# Patient Record
Sex: FEMALE | Race: WHITE | NOT HISPANIC OR LATINO
[De-identification: names, ages, dates, MRNs, and addresses within clinical notes are randomized per-mention and may not be internally consistent; named-entity substitution may affect disease eponyms.]

---

## 2019-04-09 ENCOUNTER — APPOINTMENT (OUTPATIENT)
Dept: OPHTHALMOLOGY | Facility: CLINIC | Age: 29
End: 2019-04-09
Payer: COMMERCIAL

## 2019-04-09 DIAGNOSIS — D69.3 IMMUNE THROMBOCYTOPENIC PURPURA: ICD-10-CM

## 2019-04-09 PROBLEM — Z00.00 ENCOUNTER FOR PREVENTIVE HEALTH EXAMINATION: Status: ACTIVE | Noted: 2019-04-09

## 2019-04-09 PROCEDURE — 92083 EXTENDED VISUAL FIELD XM: CPT

## 2019-04-09 PROCEDURE — 99204 OFFICE O/P NEW MOD 45 MIN: CPT

## 2019-04-09 PROCEDURE — 92133 CPTRZD OPH DX IMG PST SGM ON: CPT

## 2019-04-13 ENCOUNTER — INPATIENT (INPATIENT)
Facility: HOSPITAL | Age: 29
LOS: 2 days | Discharge: HOME | DRG: 72 | End: 2019-04-16
Attending: PSYCHIATRY & NEUROLOGY | Admitting: PSYCHIATRY & NEUROLOGY
Payer: COMMERCIAL

## 2019-04-13 VITALS
WEIGHT: 130.07 LBS | RESPIRATION RATE: 16 BRPM | DIASTOLIC BLOOD PRESSURE: 87 MMHG | HEIGHT: 64 IN | SYSTOLIC BLOOD PRESSURE: 129 MMHG | OXYGEN SATURATION: 100 % | HEART RATE: 94 BPM | TEMPERATURE: 99 F

## 2019-04-13 DIAGNOSIS — Z90.49 ACQUIRED ABSENCE OF OTHER SPECIFIED PARTS OF DIGESTIVE TRACT: Chronic | ICD-10-CM

## 2019-04-13 DIAGNOSIS — R51 HEADACHE: ICD-10-CM

## 2019-04-13 LAB
ALBUMIN SERPL ELPH-MCNC: 5 G/DL — SIGNIFICANT CHANGE UP (ref 3.3–5)
ALP SERPL-CCNC: 59 U/L — SIGNIFICANT CHANGE UP (ref 40–120)
ALT FLD-CCNC: 16 U/L — SIGNIFICANT CHANGE UP (ref 10–45)
ANION GAP SERPL CALC-SCNC: 17 MMOL/L — SIGNIFICANT CHANGE UP (ref 5–17)
APPEARANCE UR: CLEAR — SIGNIFICANT CHANGE UP
APTT BLD: 24.9 SEC — LOW (ref 27.5–36.3)
AST SERPL-CCNC: 19 U/L — SIGNIFICANT CHANGE UP (ref 10–40)
BASOPHILS # BLD AUTO: 0.1 K/UL — SIGNIFICANT CHANGE UP (ref 0–0.2)
BASOPHILS NFR BLD AUTO: 0.5 % — SIGNIFICANT CHANGE UP (ref 0–2)
BILIRUB SERPL-MCNC: 0.4 MG/DL — SIGNIFICANT CHANGE UP (ref 0.2–1.2)
BILIRUB UR-MCNC: NEGATIVE — SIGNIFICANT CHANGE UP
BUN SERPL-MCNC: 5 MG/DL — LOW (ref 7–23)
CALCIUM SERPL-MCNC: 10 MG/DL — SIGNIFICANT CHANGE UP (ref 8.4–10.5)
CHLORIDE SERPL-SCNC: 101 MMOL/L — SIGNIFICANT CHANGE UP (ref 96–108)
CO2 SERPL-SCNC: 22 MMOL/L — SIGNIFICANT CHANGE UP (ref 22–31)
COLOR SPEC: COLORLESS — SIGNIFICANT CHANGE UP
CREAT SERPL-MCNC: 0.69 MG/DL — SIGNIFICANT CHANGE UP (ref 0.5–1.3)
DIFF PNL FLD: NEGATIVE — SIGNIFICANT CHANGE UP
EOSINOPHIL # BLD AUTO: 0.3 K/UL — SIGNIFICANT CHANGE UP (ref 0–0.5)
EOSINOPHIL NFR BLD AUTO: 3.2 % — SIGNIFICANT CHANGE UP (ref 0–6)
ERYTHROCYTE [SEDIMENTATION RATE] IN BLOOD: 5 MM/HR — SIGNIFICANT CHANGE UP (ref 0–15)
GLUCOSE SERPL-MCNC: 79 MG/DL — SIGNIFICANT CHANGE UP (ref 70–99)
GLUCOSE UR QL: NEGATIVE — SIGNIFICANT CHANGE UP
HCG UR QL: NEGATIVE — SIGNIFICANT CHANGE UP
HCT VFR BLD CALC: 47.7 % — HIGH (ref 34.5–45)
HGB BLD-MCNC: 15.2 G/DL — SIGNIFICANT CHANGE UP (ref 11.5–15.5)
INR BLD: 0.96 RATIO — SIGNIFICANT CHANGE UP (ref 0.88–1.16)
KETONES UR-MCNC: NEGATIVE — SIGNIFICANT CHANGE UP
LEUKOCYTE ESTERASE UR-ACNC: NEGATIVE — SIGNIFICANT CHANGE UP
LYMPHOCYTES # BLD AUTO: 2.9 K/UL — SIGNIFICANT CHANGE UP (ref 1–3.3)
LYMPHOCYTES # BLD AUTO: 27.4 % — SIGNIFICANT CHANGE UP (ref 13–44)
MCHC RBC-ENTMCNC: 27.8 PG — SIGNIFICANT CHANGE UP (ref 27–34)
MCHC RBC-ENTMCNC: 31.9 GM/DL — LOW (ref 32–36)
MCV RBC AUTO: 87 FL — SIGNIFICANT CHANGE UP (ref 80–100)
MONOCYTES # BLD AUTO: 0.5 K/UL — SIGNIFICANT CHANGE UP (ref 0–0.9)
MONOCYTES NFR BLD AUTO: 5 % — SIGNIFICANT CHANGE UP (ref 2–14)
NEUTROPHILS # BLD AUTO: 6.8 K/UL — SIGNIFICANT CHANGE UP (ref 1.8–7.4)
NEUTROPHILS NFR BLD AUTO: 63.8 % — SIGNIFICANT CHANGE UP (ref 43–77)
NITRITE UR-MCNC: NEGATIVE — SIGNIFICANT CHANGE UP
PH UR: 6.5 — SIGNIFICANT CHANGE UP (ref 5–8)
PLATELET # BLD AUTO: 287 K/UL — SIGNIFICANT CHANGE UP (ref 150–400)
POTASSIUM SERPL-MCNC: 4 MMOL/L — SIGNIFICANT CHANGE UP (ref 3.5–5.3)
POTASSIUM SERPL-SCNC: 4 MMOL/L — SIGNIFICANT CHANGE UP (ref 3.5–5.3)
PROT SERPL-MCNC: 8.4 G/DL — HIGH (ref 6–8.3)
PROT UR-MCNC: NEGATIVE — SIGNIFICANT CHANGE UP
PROTHROM AB SERPL-ACNC: 10.9 SEC — SIGNIFICANT CHANGE UP (ref 10–12.9)
RBC # BLD: 5.48 M/UL — HIGH (ref 3.8–5.2)
RBC # FLD: 12.5 % — SIGNIFICANT CHANGE UP (ref 10.3–14.5)
SODIUM SERPL-SCNC: 140 MMOL/L — SIGNIFICANT CHANGE UP (ref 135–145)
SP GR SPEC: 1.01 — LOW (ref 1.01–1.02)
UROBILINOGEN FLD QL: NEGATIVE — SIGNIFICANT CHANGE UP
WBC # BLD: 10.6 K/UL — HIGH (ref 3.8–10.5)
WBC # FLD AUTO: 10.6 K/UL — HIGH (ref 3.8–10.5)

## 2019-04-13 PROCEDURE — 71046 X-RAY EXAM CHEST 2 VIEWS: CPT | Mod: 26

## 2019-04-13 PROCEDURE — 99284 EMERGENCY DEPT VISIT MOD MDM: CPT

## 2019-04-13 RX ORDER — ACETAMINOPHEN 500 MG
650 TABLET ORAL EVERY 6 HOURS
Qty: 0 | Refills: 0 | Status: DISCONTINUED | OUTPATIENT
Start: 2019-04-13 | End: 2019-04-16

## 2019-04-13 NOTE — ED ADULT NURSE NOTE - NSIMPLEMENTINTERV_GEN_ALL_ED
Implemented All Fall Risk Interventions:  [place] to call system. Call bell, personal items and telephone within reach. Instruct patient to call for assistance. Room bathroom lighting operational. Non-slip footwear when patient is off stretcher. Physically safe environment: no spills, clutter or unnecessary equipment. Stretcher in lowest position, wheels locked, appropriate side rails in place. Provide visual cue, wrist band, yellow gown, etc. Monitor gait and stability. Monitor for mental status changes and reorient to person, place, and time. Review medications for side effects contributing to fall risk. Reinforce activity limits and safety measures with patient and family.

## 2019-04-13 NOTE — ED ADULT NURSE NOTE - OBJECTIVE STATEMENT
28 y/o female denies PMH presents to ED reporting h/a. Pt reports having MRI today ordered by neuro ophthalmologist, MD called pt to say she has "left sided brain lesion" and to come to ED for neuro evaluation. Pt reports feeling "foggy, dizzy and decreased peripheral vision." On exam, AOx3, speaking in complete sentences. Lung sounds CTA, NAD. Abdomen soft, non-tender, non-distended. PERRL 3mm, equal strength and sensation in all 4 extremities. Pt denies n/v/d, fever/chills, numbness, tingling, weakness, dizziness, blurred vision, CP and SOB at this time. Pt also declines losing control of bladder and bowel function. Heplock placed, labs sent. Awaiting urine collection & CXR at this time. Seen and evaluated by MD. Pt and family aware of plan of care at this time. Pt declines tylenol at this time.

## 2019-04-13 NOTE — ED PROVIDER NOTE - CLINICAL SUMMARY MEDICAL DECISION MAKING FREE TEXT BOX
29 y old f with headache and rt visual disturbance ,abnormal MRI , will obtain blood work ,review MRI neuro cons and on reevaluation: ZR

## 2019-04-13 NOTE — ED PROVIDER NOTE - OBJECTIVE STATEMENT
29 y old f with a history of ITP ,In remission  came in complains of rt side headache and rt eye vision problem for the last week ,she noticed that she bumps to furniture and mirror because doesn't see well on rt side ,seen by neurophthalmologist dr Miller who did MRI of her brain and pt was called to go to ER because of lesion on l side of her brain ,no fever or chills ,nausea or vomiting

## 2019-04-13 NOTE — ED ADULT TRIAGE NOTE - CHIEF COMPLAINT QUOTE
sent to see neurology for "lesion in the brain"  having headache, peripheral vision loss and headache

## 2019-04-14 ENCOUNTER — RESULT REVIEW (OUTPATIENT)
Age: 29
End: 2019-04-14

## 2019-04-14 LAB
APPEARANCE CSF: CLEAR — SIGNIFICANT CHANGE UP
APPEARANCE CSF: CLEAR — SIGNIFICANT CHANGE UP
APPEARANCE SPUN FLD: COLORLESS — SIGNIFICANT CHANGE UP
APPEARANCE SPUN FLD: COLORLESS — SIGNIFICANT CHANGE UP
COLOR CSF: SIGNIFICANT CHANGE UP
COLOR CSF: SIGNIFICANT CHANGE UP
CSF PCR RESULT: SIGNIFICANT CHANGE UP
GLUCOSE CSF-MCNC: 50 MG/DL — SIGNIFICANT CHANGE UP (ref 40–70)
GRAM STN FLD: SIGNIFICANT CHANGE UP
LABORATORY COMMENT REPORT: SIGNIFICANT CHANGE UP
LDH CSF L TO P-CCNC: 20 U/L — SIGNIFICANT CHANGE UP
LDH FLD-CCNC: 20 U/L — SIGNIFICANT CHANGE UP
LYMPHOCYTES # CSF: 82 % — HIGH (ref 40–80)
LYMPHOCYTES # CSF: 93 % — HIGH (ref 40–80)
MONOS+MACROS NFR CSF: 18 % — SIGNIFICANT CHANGE UP (ref 15–45)
MONOS+MACROS NFR CSF: 7 % — LOW (ref 15–45)
NEUTROPHILS # CSF: 0 % — SIGNIFICANT CHANGE UP (ref 0–6)
NEUTROPHILS # CSF: 0 % — SIGNIFICANT CHANGE UP (ref 0–6)
NRBC NFR CSF: 1 /UL — SIGNIFICANT CHANGE UP (ref 0–5)
NRBC NFR CSF: 1 /UL — SIGNIFICANT CHANGE UP (ref 0–5)
PROT CSF-MCNC: 50 MG/DL — HIGH (ref 15–45)
RBC # CSF: 0 /UL — SIGNIFICANT CHANGE UP (ref 0–0)
RBC # CSF: 0 /UL — SIGNIFICANT CHANGE UP (ref 0–0)
SOURCE HSV 1/2: SIGNIFICANT CHANGE UP
SPECIMEN SOURCE: SIGNIFICANT CHANGE UP
TUBE TYPE: SIGNIFICANT CHANGE UP
TUBE TYPE: SIGNIFICANT CHANGE UP

## 2019-04-14 PROCEDURE — 88108 CYTOPATH CONCENTRATE TECH: CPT | Mod: 26

## 2019-04-14 PROCEDURE — 70553 MRI BRAIN STEM W/O & W/DYE: CPT | Mod: 26

## 2019-04-14 RX ORDER — ACETAMINOPHEN 500 MG
1000 TABLET ORAL ONCE
Qty: 0 | Refills: 0 | Status: COMPLETED | OUTPATIENT
Start: 2019-04-14 | End: 2019-04-14

## 2019-04-14 RX ORDER — DIPHENHYDRAMINE HCL 50 MG
50 CAPSULE ORAL EVERY 6 HOURS
Qty: 0 | Refills: 0 | Status: DISCONTINUED | OUTPATIENT
Start: 2019-04-14 | End: 2019-04-16

## 2019-04-14 RX ORDER — ENOXAPARIN SODIUM 100 MG/ML
40 INJECTION SUBCUTANEOUS EVERY 24 HOURS
Qty: 0 | Refills: 0 | Status: DISCONTINUED | OUTPATIENT
Start: 2019-04-14 | End: 2019-04-16

## 2019-04-14 RX ORDER — ACETAMINOPHEN 500 MG
650 TABLET ORAL EVERY 6 HOURS
Qty: 0 | Refills: 0 | Status: DISCONTINUED | OUTPATIENT
Start: 2019-04-14 | End: 2019-04-16

## 2019-04-14 RX ADMIN — Medication 650 MILLIGRAM(S): at 04:25

## 2019-04-14 RX ADMIN — Medication 650 MILLIGRAM(S): at 03:12

## 2019-04-14 RX ADMIN — Medication 50 MILLIGRAM(S): at 03:52

## 2019-04-14 RX ADMIN — Medication 400 MILLIGRAM(S): at 18:49

## 2019-04-14 RX ADMIN — Medication 650 MILLIGRAM(S): at 16:52

## 2019-04-14 RX ADMIN — Medication 650 MILLIGRAM(S): at 10:44

## 2019-04-14 RX ADMIN — Medication 650 MILLIGRAM(S): at 11:20

## 2019-04-14 RX ADMIN — Medication 1000 MILLIGRAM(S): at 19:25

## 2019-04-14 NOTE — H&P ADULT - ATTENDING COMMENTS
Patient seen and examined with resident. Agree with above eval and recommendations - exception ?? (inconsistent) Right visual field cut on confrontational testing.

## 2019-04-14 NOTE — H&P ADULT - NSHPPHYSICALEXAM_GEN_ALL_CORE
PHYSICAL EXAMINATION:  General: Well-developed, well nourished, in no acute distress.  Eyes: Conjunctiva and sclera clear.  Neurologic:  - Mental Status:  Alert, awake, oriented to person, place, and time; Speech is fluent with intact naming, repetition, and comprehension; Good overall fund of knowledge;   - Cranial Nerves II-XII:    II:  Visual fields are full to confrontation; Pupils are equal, round, and reactive to light  III, IV, VI:  Extraocular movements are intact without nystagmus.  V:  Facial sensation is intact in the V1-V3 distribution bilaterally.  VII:  Face is symmetric with normal eye closure and smile  VIII:  Hearing is intact to finger rub.  IX, X:  Uvula is midline and soft palate rises symmetrically  XI:  Head turning and shoulder shrug are intact.  XII:  Tongue protudes in the midline.  - Motor:  Strength is 5/5 throughout.  There is no pronator drift.  Normal muscle bulk and tone throughout.  - Reflexes:  2+ and symmetric at the biceps, triceps, brachioradialis, knees, and ankles.  Plantar responses flexor.  - Sensory:  Intact throughout to vibration, and joint-position, light touch, pin prick.  - Coordination:  Finger-nose-finger and heel-knee-shin intact without dysmetria.  Rapid alternating hand movements intact.  - Gait:   Normal steps, base, arm swing, and turning.  Heel and toe walking are normal.  Tandem gait is normal.  Romberg testing is negative.

## 2019-04-14 NOTE — H&P ADULT - ASSESSMENT
30yo woman with a PMHx of ITP presents with complaints of right peripheral field defects. Patient follows with Neurooptha Dr. Glez. Patient was found to have right hemianopsia and sent for MRI. Outpatient MRI shows scattered lesions, single large right periventricular hyperintense lesion with contrast enhancing ring; old lesions present in right frontal lobe. Images can be reviewed with [facility].   Patient currently reporting persistent visual field defect.   Patient currently denies fevers, chills, ns, cp, sob, abd pain, n/v/d/c, weakness, or changes to weight or senses.     Impression:  Bitemporal Hemianopisa 2/2 to parietotemporal     Plan:  - LP  - Review images with [facility].

## 2019-04-14 NOTE — H&P ADULT - NSICDXFAMILYHX_GEN_ALL_CORE_FT
FAMILY HISTORY:  Grandparent  Still living? Unknown  Family history of heart disease, Age at diagnosis: Age Unknown

## 2019-04-14 NOTE — H&P ADULT - HISTORY OF PRESENT ILLNESS
30yo woman with a PMHx of ITP presents with complaints of right peripheral field defects. Patient follows with Neurooptha Dr. Glez. Patient was found to have right hemianopsia and sent for MRI. Outpatient MRI shows scattered lesions, single large right periventricular hyperintense lesion with contrast enhancing ring; old lesions present in right frontal lobe. Images can be reviewed with [facility].   Patient currently reporting persistent visual field defect.   Patient currently denies fevers, chills, ns, cp, sob, abd pain, n/v/d/c, weakness, or changes to weight or senses.

## 2019-04-14 NOTE — H&P ADULT - NSHPLABSRESULTS_GEN_ALL_CORE
Vitals:  T(C): 37 (04-14-19 @ 00:51), Max: 37.1 (04-13-19 @ 17:48)  HR: 71 (04-14-19 @ 00:51) (71 - 94)  BP: 115/79 (04-14-19 @ 00:51) (115/79 - 129/87)  RR: 18 (04-14-19 @ 00:51) (16 - 18)  SpO2: 98% (04-14-19 @ 00:51) (98% - 100%)    Labs:                        15.2   10.6  )-----------( 287      ( 13 Apr 2019 19:18 )             47.7     04-13    140  |  101  |  5<L>  ----------------------------<  79  4.0   |  22  |  0.69    Ca    10.0      13 Apr 2019 19:18    TPro  8.4<H>  /  Alb  5.0  /  TBili  0.4  /  DBili  x   /  AST  19  /  ALT  16  /  AlkPhos  59  04-13    CAPILLARY BLOOD GLUCOSE        LIVER FUNCTIONS - ( 13 Apr 2019 19:18 )  Alb: 5.0 g/dL / Pro: 8.4 g/dL / ALK PHOS: 59 U/L / ALT: 16 U/L / AST: 19 U/L / GGT: x             PT/INR - ( 13 Apr 2019 19:18 )   PT: 10.9 sec;   INR: 0.96 ratio         PTT - ( 13 Apr 2019 19:18 )  PTT:24.9 sec  CSF:

## 2019-04-15 LAB
ALBUMIN CSF-MCNC: 26.6 MG/DL — HIGH (ref 14–25)
CMV DNA # UR NAA DL=200: SIGNIFICANT CHANGE UP IU/ML
CRYPTOC AG CSF-ACNC: NEGATIVE — SIGNIFICANT CHANGE UP
EBV PCR: SIGNIFICANT CHANGE UP IU/ML
IGG CSF-MCNC: 5.1 MG/DL — HIGH
IGG/ALB CSF: 0.19 RATIO — SIGNIFICANT CHANGE UP
JCPYV DNA # CSF NAA+PROBE: SIGNIFICANT CHANGE UP COPIES/ML
NIGHT BLUE STAIN TISS: SIGNIFICANT CHANGE UP
SPECIMEN SOURCE: SIGNIFICANT CHANGE UP
TM INTERPRETATION: SIGNIFICANT CHANGE UP

## 2019-04-15 PROCEDURE — 88188 FLOWCYTOMETRY/READ 9-15: CPT

## 2019-04-15 PROCEDURE — 99233 SBSQ HOSP IP/OBS HIGH 50: CPT

## 2019-04-15 PROCEDURE — 71260 CT THORAX DX C+: CPT | Mod: 26

## 2019-04-15 PROCEDURE — 74177 CT ABD & PELVIS W/CONTRAST: CPT | Mod: 26

## 2019-04-15 RX ORDER — ONDANSETRON 8 MG/1
4 TABLET, FILM COATED ORAL ONCE
Qty: 0 | Refills: 0 | Status: COMPLETED | OUTPATIENT
Start: 2019-04-15 | End: 2019-04-15

## 2019-04-15 RX ORDER — ACETAMINOPHEN 500 MG
1000 TABLET ORAL ONCE
Qty: 0 | Refills: 0 | Status: COMPLETED | OUTPATIENT
Start: 2019-04-15 | End: 2019-04-15

## 2019-04-15 RX ORDER — SODIUM CHLORIDE 9 MG/ML
1000 INJECTION INTRAMUSCULAR; INTRAVENOUS; SUBCUTANEOUS
Qty: 0 | Refills: 0 | Status: DISCONTINUED | OUTPATIENT
Start: 2019-04-15 | End: 2019-04-16

## 2019-04-15 RX ADMIN — Medication 650 MILLIGRAM(S): at 01:55

## 2019-04-15 RX ADMIN — Medication 650 MILLIGRAM(S): at 00:07

## 2019-04-15 RX ADMIN — Medication 400 MILLIGRAM(S): at 11:17

## 2019-04-15 RX ADMIN — Medication 50 MILLIGRAM(S): at 00:07

## 2019-04-15 RX ADMIN — Medication 650 MILLIGRAM(S): at 11:16

## 2019-04-15 RX ADMIN — Medication 650 MILLIGRAM(S): at 22:30

## 2019-04-15 RX ADMIN — Medication 650 MILLIGRAM(S): at 08:31

## 2019-04-15 RX ADMIN — ONDANSETRON 4 MILLIGRAM(S): 8 TABLET, FILM COATED ORAL at 12:19

## 2019-04-15 RX ADMIN — Medication 50 MILLIGRAM(S): at 22:22

## 2019-04-15 RX ADMIN — Medication 1000 MILLIGRAM(S): at 12:11

## 2019-04-15 RX ADMIN — Medication 650 MILLIGRAM(S): at 21:53

## 2019-04-15 NOTE — PROGRESS NOTE ADULT - SUBJECTIVE AND OBJECTIVE BOX
Subjective:Interval History - c/o headache worse with physical activity and exertion     Objective:   Vital Signs Last 24 Hrs  T(C): 36.6 (15 Apr 2019 07:57), Max: 37 (14 Apr 2019 19:52)  T(F): 97.8 (15 Apr 2019 07:57), Max: 98.6 (14 Apr 2019 19:52)  HR: 67 (15 Apr 2019 07:57) (62 - 70)  BP: 116/74 (15 Apr 2019 07:57) (116/74 - 126/83)  BP(mean): --  RR: 18 (15 Apr 2019 07:57) (18 - 18)  SpO2: 97% (15 Apr 2019 07:57) (96% - 98%)        General Exam:   General appearance: No acute distress                   Neurological Exam:  Mental Status: Orientated to self, date and place.  Attention intact.  No dysarthria, aphasia or neglect.  Knowledge intact.  Registration intact.  Short and long term memory grossly intact.      Cranial Nerves: PERRL, EOMI, no field cut on either side, APD positive on right, CN V1-3 intact to light touch and pinprick.  No facial asymmetry.  Hearing intact to finger rub bilaterally.  Tongue midline.  Sternocleidomastoid and Trapezius intact bilaterally.    Motor:   Tone: normal.                  Strength: intact throughout  Pronator drift: none                 Dysmeria: None to finger-nose-finger  Tremor: No resting, postural or action tremor.  No myoclonus.  Sensation: intact to light touch  Deep Tendon Reflexes: 2+ bilateral biceps, triceps, brachioradialis, knee and ankle  Gait: normal and stable.      Other:    04-13    140  |  101  |  5<L>  ----------------------------<  79  4.0   |  22  |  0.69    Ca    10.0      13 Apr 2019 19:18    TPro  8.4<H>  /  Alb  5.0  /  TBili  0.4  /  DBili  x   /  AST  19  /  ALT  16  /  AlkPhos  59  04-13    LIVER FUNCTIONS - ( 13 Apr 2019 19:18 )  Alb: 5.0 g/dL / Pro: 8.4 g/dL / ALK PHOS: 59 U/L / ALT: 16 U/L / AST: 19 U/L / GGT: x                                 15.2   10.6  )-----------( 287      ( 13 Apr 2019 19:18 )             47.7       Radiology    EEG:    MEDICATIONS  (STANDING):  acetaminophen  IVPB .. 1000 milliGRAM(s) IV Intermittent once  enoxaparin Injectable 40 milliGRAM(s) SubCutaneous every 24 hours    MEDICATIONS  (PRN):  acetaminophen   Tablet .. 650 milliGRAM(s) Oral every 6 hours PRN Mild Pain (1 - 3)  acetaminophen   Tablet .. 650 milliGRAM(s) Oral every 6 hours PRN Temp greater or equal to 38C (100.4F), Mild Pain (1 - 3), Moderate Pain (4 - 6)  diphenhydrAMINE 50 milliGRAM(s) Oral every 6 hours PRN Allergy or Insomnia      < from: MR Head w/wo IV Cont (04.14.19 @ 23:22) >  MPRESSION:    Enhancing hyperintense T2, FLAIR, signal with faint medial restricted ADC   and predominant ADC shine shine through in the left periatrial white   matter extending to the left posterior frontal and parietal lobes   concerning for active inflammatory change in a tumefactive demyelinating   plaque.      Scattered foci of hyperintense T2 and FLAIR signal in the periventricular   and subcortical white matter which may reflect sequela previous   demyelination, no large hemorrhage or midline shift.    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   Dedicated imaging the orbits with gadolinium and fat saturation may be   obtained as clinically warranted.    < end of copied text >

## 2019-04-15 NOTE — PROGRESS NOTE ADULT - ASSESSMENT
28yo woman with a PMHx of ITP presents with complaints of right peripheral field defects. Outpatient MRI shows scattered lesions, single large right periventricular hyperintense lesion with contrast enhancing ring; old lesions present in right frontal lobe. MRI brain showed T2 flair enhancing lesion in left parietal region extending into left posterior frontal. LP was done, CSF showed protein 50, cell count is 1, differential cell count includes lymphocytosis. CSF PCR was negative. IgG CSF is elevated. She has positive APD in right eye but no eye pain or no decrease in VA.     Differential diagnosis includes Tumefactive MS vs Lymphoma     Plan     note is incomplete   Neuro onc consult   Solumedrol 1 gram for 5 days if clear by neuro onc   GI prophylaxis with pantoprazole 28yo woman with a PMHx of ITP presents with complaints of right peripheral field defects. Outpatient MRI shows scattered lesions, single large right periventricular hyperintense lesion with contrast enhancing ring; old lesions present in right frontal lobe. MRI brain showed T2 flair enhancing lesion in left parietal region extending into left posterior frontal. LP was done, CSF showed protein 50, cell count is 1, differential cell count includes lymphocytosis. CSF PCR was negative. IgG CSF is elevated. She has positive APD in right eye but no eye pain or no decrease in VA.     Differential diagnosis includes Tumefactive MS vs Lymphoma     Plan     CT chest abdomen and pelvis with contrast to rule out malignancy   Neuro onc consult   Solumedrol 1 gram for 5 days if clear by neuro onc   GI prophylaxis with pantoprazole   DVT prophylax

## 2019-04-16 ENCOUNTER — TRANSCRIPTION ENCOUNTER (OUTPATIENT)
Age: 29
End: 2019-04-16

## 2019-04-16 VITALS
TEMPERATURE: 98 F | SYSTOLIC BLOOD PRESSURE: 123 MMHG | OXYGEN SATURATION: 97 % | HEART RATE: 106 BPM | RESPIRATION RATE: 18 BRPM | DIASTOLIC BLOOD PRESSURE: 86 MMHG

## 2019-04-16 LAB
NON-GYNECOLOGICAL CYTOLOGY STUDY: SIGNIFICANT CHANGE UP
PROT CSF-MCNC: 50 MG/DL — HIGH (ref 15–45)
VDRL CSF-TITR: NEGATIVE — SIGNIFICANT CHANGE UP

## 2019-04-16 PROCEDURE — 99231 SBSQ HOSP IP/OBS SF/LOW 25: CPT

## 2019-04-16 RX ORDER — METOCLOPRAMIDE HCL 10 MG
10 TABLET ORAL ONCE
Qty: 0 | Refills: 0 | Status: DISCONTINUED | OUTPATIENT
Start: 2019-04-16 | End: 2019-04-16

## 2019-04-16 RX ORDER — ONDANSETRON 8 MG/1
4 TABLET, FILM COATED ORAL ONCE
Qty: 0 | Refills: 0 | Status: DISCONTINUED | OUTPATIENT
Start: 2019-04-16 | End: 2019-04-16

## 2019-04-16 RX ORDER — KETOROLAC TROMETHAMINE 30 MG/ML
30 SYRINGE (ML) INJECTION ONCE
Qty: 0 | Refills: 0 | Status: DISCONTINUED | OUTPATIENT
Start: 2019-04-16 | End: 2019-04-16

## 2019-04-16 RX ORDER — ACETAMINOPHEN 500 MG
1000 TABLET ORAL ONCE
Qty: 0 | Refills: 0 | Status: COMPLETED | OUTPATIENT
Start: 2019-04-16 | End: 2019-04-16

## 2019-04-16 RX ORDER — MAGNESIUM SULFATE 500 MG/ML
2 VIAL (ML) INJECTION ONCE
Qty: 0 | Refills: 0 | Status: COMPLETED | OUTPATIENT
Start: 2019-04-16 | End: 2019-04-16

## 2019-04-16 RX ORDER — TRAMADOL HYDROCHLORIDE 50 MG/1
1 TABLET ORAL
Qty: 21 | Refills: 0 | OUTPATIENT
Start: 2019-04-16 | End: 2019-04-22

## 2019-04-16 RX ORDER — DIPHENHYDRAMINE HCL 50 MG
50 CAPSULE ORAL ONCE
Qty: 0 | Refills: 0 | Status: DISCONTINUED | OUTPATIENT
Start: 2019-04-16 | End: 2019-04-16

## 2019-04-16 RX ORDER — TRAMADOL HYDROCHLORIDE 50 MG/1
50 TABLET ORAL THREE TIMES A DAY
Qty: 0 | Refills: 0 | Status: DISCONTINUED | OUTPATIENT
Start: 2019-04-16 | End: 2019-04-16

## 2019-04-16 RX ORDER — CEFUROXIME AXETIL 250 MG
1 TABLET ORAL
Qty: 14 | Refills: 0 | OUTPATIENT
Start: 2019-04-16 | End: 2019-04-22

## 2019-04-16 RX ADMIN — Medication 30 MILLIGRAM(S): at 10:34

## 2019-04-16 RX ADMIN — Medication 1000 MILLIGRAM(S): at 13:26

## 2019-04-16 RX ADMIN — ENOXAPARIN SODIUM 40 MILLIGRAM(S): 100 INJECTION SUBCUTANEOUS at 12:25

## 2019-04-16 RX ADMIN — Medication 400 MILLIGRAM(S): at 12:24

## 2019-04-16 RX ADMIN — Medication 30 MILLIGRAM(S): at 11:00

## 2019-04-16 RX ADMIN — Medication 50 GRAM(S): at 12:24

## 2019-04-16 NOTE — DISCHARGE NOTE PROVIDER - HOSPITAL COURSE
30yo woman with a PMHx of ITP presents with complaints of right peripheral field defects. Outpatient MRI shows scattered lesions, single large right periventricular hyperintense lesion with contrast enhancing ring; old lesions present in right frontal lobe. MRI brain showed T2 flair lesion in left parietal region extending into left posterior frontal that enhances with contrast. LP was done, CSF showed protein 50, cell count is 1, differential cell count includes lymphocytosis. CSF PCR was negative. IgG CSF is elevated. She had a positive APD in right eye but no eye pain or no decrease in VA. CT chest/abdomen/pelvis was done as there is concern for lymphoma, which showed dermoid mass in the left adnexa along with nonspecific retroperiotoneal and supraclavicular lymph nodes. Case was discussed with Dr. Sammy Lopez, neuro-oncologist. Most informative method of establishing diagnosis would be brain biopsy. However, given that patient is likely to have very good outpatient follow up, it is reasonable to hold off on brain biopsy at this time and repeat MRI brain as outpatient. If this is tumefactive MS, we would expect lesions to subside in several weeks. If lymphoma, lesions will persist. Brain biopsy could then be considered after outpatient MRI brain. Patient was stabilized and discharged home with outpatient f/u with Dr. Sammy Lopez, neuro-oncologist. She should be scheduled for an MRI brain w/ and w/o contrast in 4 weeks after discharge from the hospital.

## 2019-04-16 NOTE — PROGRESS NOTE ADULT - ASSESSMENT
28yo woman with a PMHx of ITP presents with complaints of right peripheral field defects. Outpatient MRI shows scattered lesions, single large right periventricular hyperintense lesion with contrast enhancing ring; old lesions present in right frontal lobe. MRI brain showed T2 flair lesion in left parietal region extending into left posterior frontal that enhances with contrast. LP was done, CSF showed protein 50, cell count is 1, differential cell count includes lymphocytosis. CSF PCR was negative. IgG CSF is elevated. She has positive APD in right eye but no eye pain or no decrease in VA. CT chest/abdomen/pelvis was done as there is concern for lymphoma, which showed dermoid mass in the left adnexa.    Differential diagnosis includes Tumefactive MS vs Lymphoma     Plan:  -plan for discharge 4/16/19  -f/u outpatient with Dr. Sammy Lopez, neuro-oncologist 28yo woman with a PMHx of ITP presents with complaints of right peripheral field defects. Outpatient MRI shows scattered lesions, single large right periventricular hyperintense lesion with contrast enhancing ring; old lesions present in right frontal lobe. MRI brain showed T2 flair lesion in left parietal region extending into left posterior frontal that enhances with contrast. LP was done, CSF showed protein 50, cell count is 1, differential cell count includes lymphocytosis. CSF PCR was negative. IgG CSF is elevated. She has positive APD in right eye but no eye pain or no decrease in VA. CT chest/abdomen/pelvis was done as there is concern for lymphoma, which showed dermoid mass in the left adnexa.    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 Most informative method of establishing diagnosis would be brain biopsy. However, given that patient is likely to have very good outpatient follow up, it is reasonable to hold off on brain biopsy at this time and repeat MRI as outpatient. If this is tumefactive MS, we would expect lesions to subside in several weeks. If lymphoma, lesions will persist. Brain biopsy could then be considered after outpatient MRI brain.    Plan:  -plan for discharge 4/16/19  -plan for MRI brain w/ and w/o contrast 4 weeks after discharge from the hospital  -f/u outpatient with Dr. Sammy Lopez, neuro-oncologist  -brain biopsy to be considered after the MRI brain w/ and w/o contrast 4 weeks after discharge  -tramadol 50 mg q8 PRN for headaches  -f/u oligoclonal bands  -patient advised not to drive at this time

## 2019-04-16 NOTE — PROGRESS NOTE ADULT - ATTENDING COMMENTS
Patient seen and examined, no VF cut demonstrated, motor exam normal.  MRI brain reviewed:  there are three enhancing lesions, two abutting the splenium of the corpus - suspicious to me for lymphoma.  Her age and the multifocal nature would steer me away from GBM.     Will get CT C/A/P w/wo contrast to r/o systemic disease/lymphoma.  Ultimately needs brain bx to make diagnosis unless lesions found in thorax or abd/pelvis.      Will consult with Dr. Garcia, hold steroids for now.
No change.  After discussion with Dr. Garcia, the DDx includes lymphoma vs. tumefactive MS.  Will DC home (CT C/A/P negative except for two reactive lymph nodes) and she will f/u in 4 wks with Dr. Garcia for repeat imaging to determine the ultimate dx.  Patient family agree with plan.  She continues to have mild post-LP HA and will drink caffeine at home, tramadol 50mg PRN

## 2019-04-16 NOTE — DISCHARGE NOTE NURSING/CASE MANAGEMENT/SOCIAL WORK - NSDCPEPTSTRK_GEN_ALL_CORE
Need for follow up after discharge/Prescribed medications/Stroke education booklet/Call 911 for stroke/Stroke support groups for patients, families, and friends/Signs and symptoms of stroke/Risk factors for stroke/Stroke warning signs and symptoms

## 2019-04-16 NOTE — DISCHARGE NOTE NURSING/CASE MANAGEMENT/SOCIAL WORK - NSDCDPATPORTLINK_GEN_ALL_CORE
You can access the [facility] Patient Portal, offered by [facility], by registering with the following website: [email or web address]

## 2019-04-16 NOTE — PROGRESS NOTE ADULT - SUBJECTIVE AND OBJECTIVE BOX
Neurology Follow up note    Patient is a 29y old  Female who presents with a chief complaint of MRI findings (15 Apr 2019 09:58)      Subjective: Interval History - No events overnight    Objective:   Vital Signs Last 24 Hrs  T(C): 36.8 (16 Apr 2019 08:04), Max: 36.8 (15 Apr 2019 12:13)  T(F): 98.2 (16 Apr 2019 08:04), Max: 98.3 (15 Apr 2019 16:24)  HR: 81 (16 Apr 2019 08:04) (60 - 85)  BP: 107/69 (16 Apr 2019 08:04) (107/69 - 131/87)  BP(mean): --  RR: 18 (16 Apr 2019 08:04) (16 - 18)  SpO2: 98% (16 Apr 2019 08:04) (94% - 99%)    General Exam:   General appearance: No acute distress                 Cardiovascular: Pedal dorsalis pulses intact bilaterally    Neurological Exam:  Mental Status: Orientated to self, date and place.  Attention intact.  No dysarthria, aphasia or neglect.  Knowledge intact.  Registration intact.  Short and long term memory grossly intact.      Cranial Nerves:  PERRL, EOMI, VFF, no nystagmus or diplopia.  No APD.    CN V1-3 intact to light touch and pinprick.  No facial asymmetry.  Hearing intact to finger rub bilaterally.  Tongue, uvula and palate midline.  Sternocleidomastoid and Trapezius intact bilaterally.    Motor:   Tone: normal.                  Strength: intact throughout  Pronator drift: none                 Dysmeria: None to finger-nose-finger or heel-shin-heel  No truncal ataxia.    Tremor: No resting, postural or action tremor.  No myoclonus.    Sensation: intact to light touch, pinprick, vibration and proprioception    Deep Tendon Reflexes: 1+ bilateral biceps, triceps, brachioradialis, knee and ankle  Toes flexor bilaterally    Gait: normal and stable.      Other:                    Radiology    EKG:  tele:  TTE:  EEG:      MEDICATIONS  (STANDING):  enoxaparin Injectable 40 milliGRAM(s) SubCutaneous every 24 hours  sodium chloride 0.9%. 1000 milliLiter(s) (120 mL/Hr) IV Continuous <Continuous>    MEDICATIONS  (PRN):  acetaminophen   Tablet .. 650 milliGRAM(s) Oral every 6 hours PRN Mild Pain (1 - 3)  acetaminophen   Tablet .. 650 milliGRAM(s) Oral every 6 hours PRN Temp greater or equal to 38C (100.4F), Mild Pain (1 - 3), Moderate Pain (4 - 6)  diphenhydrAMINE 50 milliGRAM(s) Oral every 6 hours PRN Allergy or Insomnia Neurology Follow up note    Patient is a 29y old  Female who presents with a chief complaint of MRI findings (15 Apr 2019 09:58)      Subjective: Interval History - No events overnight. c/o headache that worsens with sitting up.    Objective:   Vital Signs Last 24 Hrs  T(C): 36.8 (16 Apr 2019 08:04), Max: 36.8 (15 Apr 2019 12:13)  T(F): 98.2 (16 Apr 2019 08:04), Max: 98.3 (15 Apr 2019 16:24)  HR: 81 (16 Apr 2019 08:04) (60 - 85)  BP: 107/69 (16 Apr 2019 08:04) (107/69 - 131/87)  BP(mean): --  RR: 18 (16 Apr 2019 08:04) (16 - 18)  SpO2: 98% (16 Apr 2019 08:04) (94% - 99%)      Neurological Exam:    Mental Status: Orientated to self, date and place.  Attention intact.              Radiology    EKG:  tele:  TTE:  EEG:      MEDICATIONS  (STANDING):  enoxaparin Injectable 40 milliGRAM(s) SubCutaneous every 24 hours  sodium chloride 0.9%. 1000 milliLiter(s) (120 mL/Hr) IV Continuous <Continuous>    MEDICATIONS  (PRN):  acetaminophen   Tablet .. 650 milliGRAM(s) Oral every 6 hours PRN Mild Pain (1 - 3)  acetaminophen   Tablet .. 650 milliGRAM(s) Oral every 6 hours PRN Temp greater or equal to 38C (100.4F), Mild Pain (1 - 3), Moderate Pain (4 - 6)  diphenhydrAMINE 50 milliGRAM(s) Oral every 6 hours PRN Allergy or Insomnia Neurology Follow up note    Patient is a 29y old  Female who presents with a chief complaint of MRI findings (15 Apr 2019 09:58)      Subjective: Interval History - No events overnight. c/o headache that worsens with sitting up.    Objective:   Vital Signs Last 24 Hrs  T(C): 36.8 (16 Apr 2019 08:04), Max: 36.8 (15 Apr 2019 12:13)  T(F): 98.2 (16 Apr 2019 08:04), Max: 98.3 (15 Apr 2019 16:24)  HR: 81 (16 Apr 2019 08:04) (60 - 85)  BP: 107/69 (16 Apr 2019 08:04) (107/69 - 131/87)  BP(mean): --  RR: 18 (16 Apr 2019 08:04) (16 - 18)  SpO2: 98% (16 Apr 2019 08:04) (94% - 99%)    General: lying in bed comfortably, NAD.    Neurological Exam:    Mental Status: Orientated to self, date and place.  Attention intact.      Radiology    EKG:  tele:  TTE:  EEG:      MEDICATIONS  (STANDING):  enoxaparin Injectable 40 milliGRAM(s) SubCutaneous every 24 hours  sodium chloride 0.9%. 1000 milliLiter(s) (120 mL/Hr) IV Continuous <Continuous>    MEDICATIONS  (PRN):  acetaminophen   Tablet .. 650 milliGRAM(s) Oral every 6 hours PRN Mild Pain (1 - 3)  acetaminophen   Tablet .. 650 milliGRAM(s) Oral every 6 hours PRN Temp greater or equal to 38C (100.4F), Mild Pain (1 - 3), Moderate Pain (4 - 6)  diphenhydrAMINE 50 milliGRAM(s) Oral every 6 hours PRN Allergy or Insomnia

## 2019-04-16 NOTE — DISCHARGE NOTE PROVIDER - CARE PROVIDER_API CALL
Sammy Lopez)  Neurology  [address] [place] [address]  Phone: [phone]  Fax: [phone]  Follow Up Time:

## 2019-04-16 NOTE — DISCHARGE NOTE PROVIDER - NSDCCPCAREPLAN_GEN_ALL_CORE_FT
PRINCIPAL DISCHARGE DIAGNOSIS  Diagnosis: Brain lesion  Assessment and Plan of Treatment: You were found to have a lesion in your brain. We are not sure what is causing this. It could be either multiple sclerosis or lymphoma. You will need to follow up with Dr. Sammy Lopez, neuro-oncologist, regarding this lesion. You will need a repeat MRI brain in 4 weeks after discharge from the hospital to see if the lesion has progressed.      SECONDARY DISCHARGE DIAGNOSES  Diagnosis: Headache  Assessment and Plan of Treatment: Please take tramadol as needed for the headache.

## 2019-04-17 LAB
CULTURE RESULTS: NO GROWTH — SIGNIFICANT CHANGE UP
SPECIMEN SOURCE: SIGNIFICANT CHANGE UP
WNV IGG CSF IA-ACNC: POSITIVE
WNV IGM CSF IA-ACNC: NEGATIVE — SIGNIFICANT CHANGE UP

## 2019-04-18 ENCOUNTER — INPATIENT (INPATIENT)
Facility: HOSPITAL | Age: 29
LOS: 7 days | Discharge: HOME | DRG: 25 | End: 2019-04-26
Attending: PSYCHIATRY & NEUROLOGY | Admitting: PSYCHIATRY & NEUROLOGY
Payer: COMMERCIAL

## 2019-04-18 VITALS
HEART RATE: 71 BPM | HEIGHT: 64 IN | SYSTOLIC BLOOD PRESSURE: 137 MMHG | RESPIRATION RATE: 16 BRPM | TEMPERATURE: 98 F | WEIGHT: 130.07 LBS | DIASTOLIC BLOOD PRESSURE: 91 MMHG | OXYGEN SATURATION: 98 %

## 2019-04-18 DIAGNOSIS — R51 HEADACHE: ICD-10-CM

## 2019-04-18 DIAGNOSIS — Z90.49 ACQUIRED ABSENCE OF OTHER SPECIFIED PARTS OF DIGESTIVE TRACT: Chronic | ICD-10-CM

## 2019-04-18 LAB
ALBUMIN SERPL ELPH-MCNC: 4.4 G/DL — SIGNIFICANT CHANGE UP (ref 3.3–5)
ALP SERPL-CCNC: 55 U/L — SIGNIFICANT CHANGE UP (ref 40–120)
ALT FLD-CCNC: 17 U/L — SIGNIFICANT CHANGE UP (ref 10–45)
ANION GAP SERPL CALC-SCNC: 13 MMOL/L — SIGNIFICANT CHANGE UP (ref 5–17)
APTT BLD: 41.7 SEC — HIGH (ref 27.5–36.3)
AST SERPL-CCNC: 29 U/L — SIGNIFICANT CHANGE UP (ref 10–40)
B BURGDOR DNA SPEC QL NAA+PROBE: NEGATIVE — SIGNIFICANT CHANGE UP
BASOPHILS # BLD AUTO: 0 K/UL — SIGNIFICANT CHANGE UP (ref 0–0.2)
BASOPHILS NFR BLD AUTO: 0.3 % — SIGNIFICANT CHANGE UP (ref 0–2)
BILIRUB SERPL-MCNC: 0.4 MG/DL — SIGNIFICANT CHANGE UP (ref 0.2–1.2)
BUN SERPL-MCNC: 5 MG/DL — LOW (ref 7–23)
CALCIUM SERPL-MCNC: 9.4 MG/DL — SIGNIFICANT CHANGE UP (ref 8.4–10.5)
CHLORIDE SERPL-SCNC: 101 MMOL/L — SIGNIFICANT CHANGE UP (ref 96–108)
CO2 SERPL-SCNC: 22 MMOL/L — SIGNIFICANT CHANGE UP (ref 22–31)
CREAT SERPL-MCNC: 0.61 MG/DL — SIGNIFICANT CHANGE UP (ref 0.5–1.3)
EOSINOPHIL # BLD AUTO: 0.3 K/UL — SIGNIFICANT CHANGE UP (ref 0–0.5)
EOSINOPHIL NFR BLD AUTO: 3.1 % — SIGNIFICANT CHANGE UP (ref 0–6)
GLUCOSE SERPL-MCNC: 78 MG/DL — SIGNIFICANT CHANGE UP (ref 70–99)
HCG SERPL-ACNC: <2 MIU/ML — SIGNIFICANT CHANGE UP
HCT VFR BLD CALC: 44.9 % — SIGNIFICANT CHANGE UP (ref 34.5–45)
HGB BLD-MCNC: 14.8 G/DL — SIGNIFICANT CHANGE UP (ref 11.5–15.5)
INNER EAR 68KD AB FLD QL: <5 U/L — SIGNIFICANT CHANGE UP
INR BLD: 1.03 RATIO — SIGNIFICANT CHANGE UP (ref 0.88–1.16)
LYMPHOCYTES # BLD AUTO: 19.1 % — SIGNIFICANT CHANGE UP (ref 13–44)
LYMPHOCYTES # BLD AUTO: 2 K/UL — SIGNIFICANT CHANGE UP (ref 1–3.3)
MCHC RBC-ENTMCNC: 28.6 PG — SIGNIFICANT CHANGE UP (ref 27–34)
MCHC RBC-ENTMCNC: 33 GM/DL — SIGNIFICANT CHANGE UP (ref 32–36)
MCV RBC AUTO: 86.8 FL — SIGNIFICANT CHANGE UP (ref 80–100)
MONOCYTES # BLD AUTO: 0.6 K/UL — SIGNIFICANT CHANGE UP (ref 0–0.9)
MONOCYTES NFR BLD AUTO: 5.6 % — SIGNIFICANT CHANGE UP (ref 2–14)
NEUTROPHILS # BLD AUTO: 7.4 K/UL — SIGNIFICANT CHANGE UP (ref 1.8–7.4)
NEUTROPHILS NFR BLD AUTO: 72 % — SIGNIFICANT CHANGE UP (ref 43–77)
PLATELET # BLD AUTO: 220 K/UL — SIGNIFICANT CHANGE UP (ref 150–400)
POTASSIUM SERPL-MCNC: 4.1 MMOL/L — SIGNIFICANT CHANGE UP (ref 3.5–5.3)
POTASSIUM SERPL-SCNC: 4.1 MMOL/L — SIGNIFICANT CHANGE UP (ref 3.5–5.3)
PROT SERPL-MCNC: 7.5 G/DL — SIGNIFICANT CHANGE UP (ref 6–8.3)
PROTHROM AB SERPL-ACNC: 11.8 SEC — SIGNIFICANT CHANGE UP (ref 10–12.9)
RBC # BLD: 5.17 M/UL — SIGNIFICANT CHANGE UP (ref 3.8–5.2)
RBC # FLD: 12.2 % — SIGNIFICANT CHANGE UP (ref 10.3–14.5)
SODIUM SERPL-SCNC: 136 MMOL/L — SIGNIFICANT CHANGE UP (ref 135–145)
WBC # BLD: 10.3 K/UL — SIGNIFICANT CHANGE UP (ref 3.8–10.5)
WBC # FLD AUTO: 10.3 K/UL — SIGNIFICANT CHANGE UP (ref 3.8–10.5)

## 2019-04-18 PROCEDURE — 70450 CT HEAD/BRAIN W/O DYE: CPT | Mod: 26

## 2019-04-18 PROCEDURE — 99285 EMERGENCY DEPT VISIT HI MDM: CPT

## 2019-04-18 RX ORDER — ONDANSETRON 8 MG/1
4 TABLET, FILM COATED ORAL EVERY 8 HOURS
Qty: 0 | Refills: 0 | Status: DISCONTINUED | OUTPATIENT
Start: 2019-04-18 | End: 2019-04-25

## 2019-04-18 RX ORDER — HYDROMORPHONE HYDROCHLORIDE 2 MG/ML
1 INJECTION INTRAMUSCULAR; INTRAVENOUS; SUBCUTANEOUS ONCE
Qty: 0 | Refills: 0 | Status: DISCONTINUED | OUTPATIENT
Start: 2019-04-18 | End: 2019-04-18

## 2019-04-18 RX ORDER — KETOROLAC TROMETHAMINE 30 MG/ML
30 SYRINGE (ML) INJECTION ONCE
Qty: 0 | Refills: 0 | Status: DISCONTINUED | OUTPATIENT
Start: 2019-04-18 | End: 2019-04-20

## 2019-04-18 RX ORDER — METOCLOPRAMIDE HCL 10 MG
10 TABLET ORAL ONCE
Qty: 0 | Refills: 0 | Status: COMPLETED | OUTPATIENT
Start: 2019-04-18 | End: 2019-04-18

## 2019-04-18 RX ORDER — SODIUM CHLORIDE 9 MG/ML
2000 INJECTION INTRAMUSCULAR; INTRAVENOUS; SUBCUTANEOUS ONCE
Qty: 0 | Refills: 0 | Status: COMPLETED | OUTPATIENT
Start: 2019-04-18 | End: 2019-04-18

## 2019-04-18 RX ORDER — MAGNESIUM SULFATE 500 MG/ML
1 VIAL (ML) INJECTION ONCE
Qty: 0 | Refills: 0 | Status: DISCONTINUED | OUTPATIENT
Start: 2019-04-18 | End: 2019-04-26

## 2019-04-18 RX ORDER — KETOROLAC TROMETHAMINE 30 MG/ML
30 SYRINGE (ML) INJECTION ONCE
Qty: 0 | Refills: 0 | Status: DISCONTINUED | OUTPATIENT
Start: 2019-04-18 | End: 2019-04-18

## 2019-04-18 RX ORDER — HYDROMORPHONE HYDROCHLORIDE 2 MG/ML
1 INJECTION INTRAMUSCULAR; INTRAVENOUS; SUBCUTANEOUS ONCE
Qty: 0 | Refills: 0 | Status: COMPLETED | OUTPATIENT
Start: 2019-04-18 | End: 2019-04-18

## 2019-04-18 RX ORDER — ACETAMINOPHEN 500 MG
975 TABLET ORAL ONCE
Qty: 0 | Refills: 0 | Status: DISCONTINUED | OUTPATIENT
Start: 2019-04-18 | End: 2019-04-18

## 2019-04-18 RX ORDER — ACETAMINOPHEN 500 MG
1000 TABLET ORAL ONCE
Qty: 0 | Refills: 0 | Status: COMPLETED | OUTPATIENT
Start: 2019-04-18 | End: 2019-04-18

## 2019-04-18 RX ORDER — ACETAMINOPHEN 500 MG
650 TABLET ORAL EVERY 6 HOURS
Qty: 0 | Refills: 0 | Status: DISCONTINUED | OUTPATIENT
Start: 2019-04-18 | End: 2019-04-25

## 2019-04-18 RX ORDER — CAFFEINE/SODIUM BENZOATE 250 MG/ML
500 VIAL (ML) INJECTION ONCE
Qty: 0 | Refills: 0 | Status: COMPLETED | OUTPATIENT
Start: 2019-04-18 | End: 2019-04-18

## 2019-04-18 RX ORDER — SODIUM CHLORIDE 9 MG/ML
1000 INJECTION INTRAMUSCULAR; INTRAVENOUS; SUBCUTANEOUS
Qty: 0 | Refills: 0 | Status: DISCONTINUED | OUTPATIENT
Start: 2019-04-18 | End: 2019-04-22

## 2019-04-18 RX ADMIN — SODIUM CHLORIDE 1333.33 MILLILITER(S): 9 INJECTION INTRAMUSCULAR; INTRAVENOUS; SUBCUTANEOUS at 16:26

## 2019-04-18 RX ADMIN — SODIUM CHLORIDE 95 MILLILITER(S): 9 INJECTION INTRAMUSCULAR; INTRAVENOUS; SUBCUTANEOUS at 19:30

## 2019-04-18 RX ADMIN — Medication 10 MILLIGRAM(S): at 16:28

## 2019-04-18 RX ADMIN — Medication 1002 MILLIGRAM(S): at 22:59

## 2019-04-18 RX ADMIN — HYDROMORPHONE HYDROCHLORIDE 1 MILLIGRAM(S): 2 INJECTION INTRAMUSCULAR; INTRAVENOUS; SUBCUTANEOUS at 21:52

## 2019-04-18 RX ADMIN — ONDANSETRON 4 MILLIGRAM(S): 8 TABLET, FILM COATED ORAL at 21:38

## 2019-04-18 RX ADMIN — Medication 1000 MILLIGRAM(S): at 19:27

## 2019-04-18 RX ADMIN — Medication 400 MILLIGRAM(S): at 16:56

## 2019-04-18 NOTE — CONSULT NOTE ADULT - SUBJECTIVE AND OBJECTIVE BOX
28 y/o F with remote history of migranes, worsening headaches 2 weeks ago status post spinal tap 5 days ago with positional headache starting less than 24hrs post dural puncture.  Patient state headache persistent even when lying flat.  Severe headache which is now constant starting yesterday regardless of position and  associated with photophobia. She states she is nauseated frequently now. Denies blurred vision.  As per neurology workup in prigress with Multiple sclerosis or lymphoma as possible diagnoses.    PMH- ITP - Platelets presently normal.    PSH- Cholecystectomy.    meds- tramadol    PE- extraocular muscles intact  motor and sensory grossly intact    labs reviewed    MRI head- multiple foci of possible active inflammatory demyelinating and chronic plaques    Impression and Plan-  discussed with neurologist-  Given multifactorial headache which may have component of PDPH but also migraines and an underlying inflammatory process which is possibly MS I would recommend conservative therapy with continued neurologic workup and reassesment especially in light of being 5 days into the usual 7-10 day self limited course of PDPH.  Therefore, recommendations include:  1. Bedrest lying flat.  2 Abdominal binder.  3. IV hydration and IV caffeine (500mg/1000ml of Normal saline over1- 2 hrs)  4. IV or PO tylenol   5 Continue tramadol or consider switching to oxycodone 5mg PO q4 prn  6 Avoid NSAIDS  7 Continue neuro workup including head reimaging  8 Would discourage epidural blood patch in active demyelinating disease as increased intracranial pressure from blood injection could exacerbate decreased conduction in demyelinated nerves with worsening.symptoms.

## 2019-04-18 NOTE — ED PROVIDER NOTE - OBJECTIVE STATEMENT
28 y/o F w pmhx of ITP, anxiety, pshx of cholecystectomy p/w ongoing worsening frontal h/a x2days, few episodes of NBNB emesis last night.  +diaphoresis. +frontal h/a w photophobia. h/a  alleviating with lying down, aggravated with sitting up. Pt noting worsening h/a over past 2 weeks, was seen at ED 5 days  ago due to c/c of visual changes and findings on OP MRI (ordered  by MD Newton) which showed scattered lesions, was admitted to hospital, LP done 5 days ago Sunday 4/14, pt discharged 2 days ago however h/a worsening since then. Denies fevers, chills, numbness tingling, abd pain, cp, sob,  urinary changes or other complaints. Allergic to sulfa drugs.   Opthomologist: Newton Attending Mitesh: 30 y/o F w pmhx of ITP, anxiety, pshx of cholecystectomy p/w ongoing worsening frontal h/a x2days, few episodes of NBNB emesis last night.  +diaphoresis. +frontal h/a w photophobia. h/a  alleviating with lying down, aggravated with sitting up. Pt noting worsening h/a over past 2 weeks, was seen at ED 5 days  ago due to c/c of visual changes and findings on OP MRI (ordered  by MD Newton) which showed scattered lesions, was admitted to hospital, LP done 5 days ago Sunday 4/14, pt discharged 2 days ago however h/a worsening since then. Denies fevers, chills, numbness tingling, abd pain, cp, sob,  urinary changes or other complaints. Allergic to sulfa drugs.   Opthomologist: Newton

## 2019-04-18 NOTE — ED ADULT NURSE REASSESSMENT NOTE - NS ED NURSE REASSESS COMMENT FT1
Patient reporting improvement in Jones, currently 4/10 and states pain is tolerable. Patient currently declining additional medication at this time. Patient aware that medication is ordered if needed.

## 2019-04-18 NOTE — H&P ADULT - HISTORY OF PRESENT ILLNESS
7/19/2018      RE: Carlos Gentile  [address]  [facility] [address]       Pediatric Cardiology Visit    Patient:  Carlos Gentile MRN:  [id number]   YOB: 2011 Age:  7  year old 2  month old   Date of Visit:  Jul 19, 2018 PCP:  Saritha Streeter NP     Dear Dr. 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  He is a 7 year old who was adopted from a [place] [facility], returned to [place] with [place] family on June 17th 2017. Parents were aware of him having a severe, inoperable heart defect and severe cyanosis. Since arrival in [place], he has been doing well. We first saw him in August to establish care and review cardiac diagnosis. He has since had chest/abdomen CTA, cardiac cath, and has had extensive dental work done.      He remains cyanotic, but keeps up with siblings when active. If family takes a mile long walk they will bring a strioller as carlos will likely get tired. He has excellenat appetite and energy level. Recently went on vacation to [place] to visit family and did well at altitude. He continues to take his aspirin.    He underwent cardiac cath this winter to map his collateral flow to lungs. He was presented to [facility] and is scheduled for surgery there in September. We had opportunity to discuss Carlos's case with Dr Griselli and he agrees with [facility] group's recommendation and will offer surgery to family to be done here at [facility].     Past medical history:    1. Heterotaxy syndrome (likley asplenic heterotaxy)  2. Single ventricle anatomy  1. 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 Right aortic arch  4. Bilateral SVC, normal IVC drainage to common atrium  5. aortopulmonary collateral vessels  6. Pulmonary veins drain to left side of  "common atrium    He has a current medication list which includes the following prescription(s): amoxicillin, aspirin, calcium carbonate antacid, vitamin d-3, and ibuprofen. Hehas No Known Allergies.    Family and social history: family history unknown (adopted). Adoptive dad did have congenital heart disease (dad does not know type, but had Latoya taussig shunt as baby, then had additional surgery at age 11 and again as teenager to fix \"holes in heart and replace a valve\"). Lives with adoptive mom, dad, 14 year ld brother and 17 year old sister.     Physical exam:  His vitals were not taken for this visit.  His body mass index is unknown because there is no height or weight on file.  His body surface area is unknown because there is no height or weight on file.  Growth percentiles are 31% for weight and 17% for height.  Carlos is alert, interactive, very active child, in no distress. He has significant peripheral and central cyanosis.  Lungs are clear with easy work of breathing.  Heart is regular with single S1, single S2, and no murmur.  Abdomen is soft without hepatomegaly.  Extremities are warm and well-perfused with no edema, severe clubbing and cyanosis, 2+ pulses in upper and lower extremities.     I reviewed and interpreted Carlos's ECG from today, which showed normal sinus rhythm rate of 104, right axis deviation, ANNMARIE, RVH.   I reviewed his echo from today, which showed:  Technically difficult study due to poor acoustic windows. Heterotaxy Syndrome.  There is right atrial isomerism. Right dominant complete atrioventricular  septal defect. Pulmonary atresia with a single outlet aorta from the right  ventricle. There is essentially a common atrium. Trivial atrioventricular  valve insufficiency. Two right and two left pulmonary veins drain into a  confluence that drains to the right superior vena cava/right-sided atrium (by  CT, not well visualized by echo). Moderate right ventricular enlargement. The  left " 30 yo woman with a PMHx of anxiety, ITP, childhood migraine presents with worsening HA, nausea for 2 days after being discharged on 4/16 during which she got an LP on 4/14. On 4/17 HA became unbearable with few episodes of nausea, emesis, photophobia and HA which worsens with sitting or standing upright improves with laying flat. On discharge, pt had mild HA which was presumed to be post-LP.    Pt was admitted previously for HA of 2 weeks and visual changes as well as outpatient MRI showing scattered lesions, single large right periventricular hyperintense lesion with contrast enhancing ring; old lesions present in right frontal lobe. MRI brain showed T2 flair lesion in left parietal region extending into left posterior frontal that enhances with contrast. LP was done, CSF showed protein 50, cell count is 1, differential cell count includes lymphocytosis. CSF PCR was negative. Pt admitted to Neurology for further management. ventricle is moderately hypoplastic. Normal right and left ventricular  function. Branch pulmonary arteries not well visualized. There are bilateral  superior vena cavae with no bridging innominate vein. By previous report there  is a right aortic arch with mirror image branching and multiple aorto-  pulmonary collateral arteries (not well demonstrated in this study). No  pericardial effusion.      CTA (8/23/2017)  1. Asplenia syndrome with right-sided isomerism, right-sided stomach and left-sided liver.  2. 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 Right and left pulmonary artery segments are present. Innumerable mediastinal MAPCAs coming from the aortic arch, left subclavian artery, descending aorta, and phrenic arteries.  3. Abnormal pulmonary venous connection with the pulmonary venous confluence draining to the right-sided superior vena cava.  4. common hepatic artery arises from the SMA.  5. Bilateral SVC without a bridging vein.  6. Intestinal malrotation  7. Right aortic arch which crosses to the left position as it enters the abdomen.    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 He has complex single ventricle anatomy with unbalanced AV canal defect, bilateral SVCs, pulmonary atresia, right aortic arch. His only source of pulmonary blood flow appears to be small aortopulmonary collaterals. Cardiac catheterization confirms CT finding of small but adequate branch PAs that are supplied by numerous networks of AP collateral vessels. We discussed Carlos's case with his family and our CT surgeon, Dr Griselli, and have decide to place a Uma shunt and plasty the PA's in hopes of PA growth and future bilateral bidirectional Hal procedures.  Family to discuss best time for surgery and will call to schedule OR and pre-op visit.     Thank you for the opportunity to participate in Carlos's  care. Please do not hesitate to call with questions or concerns.      Diagnoses:   1. Heterotaxy syndrome (likley asplenic heterotaxy)  2. 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  Discussed with the medical student, house staff team and/or resident(s) and agree with the findings and plan in this note.  I have reviewed today's vital signs, medications, labs and imaging.  Kiki Sullivan MD    Note initiated by Anand Neves MD, pediatric cardiology fellow.     Most sincerely,      Kiki Sullivan MD   Pediatric Cardiology    CC  BELISLE, MARJORIE J Griselli, Massimo    Copy to patient    Parent(s) of Carlos Gentile  [address] [place] [id number]       28 yo woman with a PMHx of anxiety, ITP, childhood migraine presents with worsening HA, nausea for 2 days after being discharged on 4/16 during which she got an LP on 4/14. On 4/17 HA became unbearable with few episodes of nausea, emesis, photophobia and HA which worsens with sitting or standing upright improves with laying flat. On discharge, pt had mild HA which was presumed to be post-LP.    Pt was admitted previously for R visual field cut as well as outpatient MRI showing scattered lesions, single large right periventricular hyperintense lesion with contrast enhancing ring; old lesions present in right frontal lobe. MRI brain showed T2 flair lesion in left parietal region extending into left posterior frontal that enhances with contrast. LP was done, CSF showed protein 50, cell count is 1, differential cell count includes lymphocytosis. CSF PCR was negative. Pt admitted to Neurology for further management. 28 yo woman with a PMHx of anxiety, ITP, childhood migraine presents with worsening HA, nausea for 2 days after being discharged on 4/16 during which she got an LP on 4/14. On 4/17 HA became unbearable with few episodes of nausea, emesis, photophobia and HA which worsens with sitting or standing upright improves with laying flat. On discharge, pt had mild HA which was presumed to be post-LP.    Pt was admitted previously for R visual field cut , mild HA for 2 weeks as well as outpatient MRI showing scattered lesions, single large right periventricular hyperintense lesion with contrast enhancing ring; old lesions present in right frontal lobe. MRI brain showed T2 flair lesion in left parietal region extending into left posterior frontal that enhances with contrast. LP was done, CSF showed protein 50, cell count is 1, differential cell count includes lymphocytosis. CSF PCR was negative. Pt admitted to Neurology for further management.

## 2019-04-18 NOTE — ED ADULT NURSE NOTE - CHPI ED NUR SYMPTOMS NEG
no change in level of consciousness/no confusion/no fever/no loss of consciousness/no blurred vision/no weakness

## 2019-04-18 NOTE — ED PROVIDER NOTE - PROGRESS NOTE DETAILS
pt seen by neurology and anesthesia. anesthesia recommends no blood patch at this time. neuro to admit patient, requests ct head to eval for acute process. - Tony Sagastume MD

## 2019-04-18 NOTE — ED PROVIDER NOTE - CLINICAL SUMMARY MEDICAL DECISION MAKING FREE TEXT BOX
Attending Mitesh: 29F with headaches, being worked up for lymphoma vs MS, presents with progressively worsening headaches, photosensitivity, nausea, vomiting. +Positional. Ddx includes but not limited to post-lp headache vs progressively worsening cns process. Plan to obtain labs, eval for anemia, metabolic disturbance, consult neurology, consult anesthesia for possible blood patch. Give analgesia. Re-eval. - Tony Sagastume MD

## 2019-04-18 NOTE — H&P ADULT - ASSESSMENT
30 yo woman with a PMHx of anxiety, ITP, childhood migraine presents with worsening HA, nausea for 2 days after being discharged on 4/16 during which she got an LP on 4/14. On 4/17 HA became unbearable with few episodes of nausea, emesis, photophobia and HA which worsens with sitting or standing upright improves with laying flat.     Impression: Acutely worsened HA w/ photophobia nausea with hx of childhood migraines with recent LP; possibly combination of post-LP HA that unmasked migraines. Low suspicion for neurovascular event at this time.    [] admit to general neurology [facility]  [] Keep patient flat in bed  [] STAT CTH in ED - r/o acute pathology, bleed  [] Migraine HA : 1g Magnesium; 30mg Toradol IV, 1mg Dilaudid x1 IVP, 4mg Zofran Q8h, IVF NS @ 95  [] Anesthesia attending evaluated patient in ED, f/u 4/19 for possible blood patch if no improvement in HA

## 2019-04-18 NOTE — ED ADULT NURSE NOTE - NSIMPLEMENTINTERV_GEN_ALL_ED
Implemented All Fall Risk Interventions:  [place] to call system. Call bell, personal items and telephone within reach. Instruct patient to call for assistance. Room bathroom lighting operational. Non-slip footwear when patient is off stretcher. Physically safe environment: no spills, clutter or unnecessary equipment. Stretcher in lowest position, wheels locked, appropriate side rails in place. Provide visual cue, wrist band, yellow gown, etc. Monitor gait and stability. Monitor for mental status changes and reorient to person, place, and time. Review medications for side effects contributing to fall risk. Reinforce activity limits and safety measures with patient and family.

## 2019-04-18 NOTE — ED ADULT NURSE REASSESSMENT NOTE - NS ED NURSE REASSESS COMMENT FT1
4 black called for report at approximately 2015 and placed on hold. at approximately 2019, transport arrived to transport patient upstairs. RN to give report continues to be on hold for 10 minutes. Charge RN made aware.

## 2019-04-18 NOTE — H&P ADULT - NSHPPHYSICALEXAM_GEN_ALL_CORE
PHYSICAL EXAMINATION:  General: appears mildly uncomfortable but NAD  Eyes: Conjunctiva and sclera clear.  Neck: Supple, nontender  Skin: no rash, no edema noted  Lung: no respiratory distress noted  Neurologic:  - Mental Status:  Alert, awake, oriented to person, place, and time; Speech is fluent with intact naming, repetition, and comprehension;   - Motor:  Strength is 5/5 throughout.  There is no pronator drift.  Normal muscle bulk and tone throughout. No myoclonus or tremor.  - Gait:   not assessed due to HA

## 2019-04-18 NOTE — H&P ADULT - NSHPREVIEWOFSYSTEMS_GEN_ALL_CORE
Patient denies trauma, LOC, fever, chills, vision changes, tinnitus, dysarthria, dysphagia, dizziness, chest pain, palpitations, SOB, diarrhea, dysuria and incontinence.

## 2019-04-18 NOTE — ED ADULT NURSE NOTE - OBJECTIVE STATEMENT
29 year old female patient presents to ED c/o worsening frontal HA since earlier this week s/p LP on Sunday morning, associated with photophobia and nausea. Patient being worked up with neuro-oncology for MRI showing scattered lesion. Patient reporting worsening HA and nausea with sitting up. Patient states she took tramadol with little relief of pain. Denies numbness, tingling, abd pain, v/d, fever, chills, weakness. Patient laying flat in bed, covering eyes- reporting 9/10 HA. Family at bedside aware of plan of care. VSS.

## 2019-04-18 NOTE — ED ADULT NURSE REASSESSMENT NOTE - NS ED NURSE REASSESS COMMENT FT1
Patient received from [facility]. Patient a&ox3, no acute distress, resp nonlabored, resting comfortably in bed with family at bedside. Denies headache, dizziness, chest pain, palpitations, SOB, abd pain, N/V/D, urinary symptoms, fevers, chills, weakness at this time. pt placed in position of comfort. Pt educated on call bell system and provided call bell. Bed in lowest position, wheels locked, appropriate side rails raised. Pt denies needs at this time.

## 2019-04-18 NOTE — ED PROVIDER NOTE - PHYSICAL EXAMINATION
CN II-XII intact. Visual fields intact. EOMI, PERRLA. Facial sensation equal bilat. Smile and eye closure equal bilat. Hearing intact bilat. Palate elevation equal, tongue protrusion midline. Lateral head rotation equal bilat. 5/5 strength UE and LE bilat. Sensation grossly intact.

## 2019-04-19 LAB
ALBUMIN SERPL ELPH-MCNC: 4 G/DL — SIGNIFICANT CHANGE UP (ref 3.3–5)
ALP SERPL-CCNC: 48 U/L — SIGNIFICANT CHANGE UP (ref 40–120)
ALT FLD-CCNC: 18 U/L — SIGNIFICANT CHANGE UP (ref 10–45)
AMPA-R AB CBA, CSF: NEGATIVE — SIGNIFICANT CHANGE UP
AMPHIPHYSIN AB TITR CSF: NEGATIVE TITER — SIGNIFICANT CHANGE UP
ANION GAP SERPL CALC-SCNC: 14 MMOL/L — SIGNIFICANT CHANGE UP (ref 5–17)
AST SERPL-CCNC: 22 U/L — SIGNIFICANT CHANGE UP (ref 10–40)
BASOPHILS # BLD AUTO: 0.02 K/UL — SIGNIFICANT CHANGE UP (ref 0–0.2)
BASOPHILS NFR BLD AUTO: 0.2 % — SIGNIFICANT CHANGE UP (ref 0–2)
BILIRUB SERPL-MCNC: 0.4 MG/DL — SIGNIFICANT CHANGE UP (ref 0.2–1.2)
BUN SERPL-MCNC: <4 MG/DL — LOW (ref 7–23)
CALCIUM SERPL-MCNC: 8.2 MG/DL — LOW (ref 8.4–10.5)
CASPR2-IGG CBA, CSF: NEGATIVE — SIGNIFICANT CHANGE UP
CHLORIDE SERPL-SCNC: 104 MMOL/L — SIGNIFICANT CHANGE UP (ref 96–108)
CO2 SERPL-SCNC: 20 MMOL/L — LOW (ref 22–31)
CREAT SERPL-MCNC: 0.54 MG/DL — SIGNIFICANT CHANGE UP (ref 0.5–1.3)
CV2 IGG TITR CSF: NEGATIVE TITER — SIGNIFICANT CHANGE UP
EOSINOPHIL # BLD AUTO: 0.25 K/UL — SIGNIFICANT CHANGE UP (ref 0–0.5)
EOSINOPHIL NFR BLD AUTO: 2.9 % — SIGNIFICANT CHANGE UP (ref 0–6)
GABA-B-R AB CBA, CSF: NEGATIVE — SIGNIFICANT CHANGE UP
GAD65 AB CSF-SCNC: 0 NMOL/L — SIGNIFICANT CHANGE UP
GLIAL NUC TYPE 1 AB TITR CSF: NEGATIVE TITER — SIGNIFICANT CHANGE UP
GLUCOSE SERPL-MCNC: 79 MG/DL — SIGNIFICANT CHANGE UP (ref 70–99)
HCT VFR BLD CALC: 36.3 % — SIGNIFICANT CHANGE UP (ref 34.5–45)
HGB BLD-MCNC: 12.1 G/DL — SIGNIFICANT CHANGE UP (ref 11.5–15.5)
HIV 1 & 2 AB SERPL IA.RAPID: SIGNIFICANT CHANGE UP
HU1 AB TITR CSF IF: NEGATIVE TITER — SIGNIFICANT CHANGE UP
HU2 AB TITR CSF IF: NEGATIVE TITER — SIGNIFICANT CHANGE UP
HU3 AB TITR CSF: NEGATIVE TITER — SIGNIFICANT CHANGE UP
IMM GRANULOCYTES NFR BLD AUTO: 0.4 % — SIGNIFICANT CHANGE UP (ref 0–1.5)
IMMUNOLOGIST REVIEW: SIGNIFICANT CHANGE UP
LGI1-IGG CBA, CSF: NEGATIVE — SIGNIFICANT CHANGE UP
LYMPHOCYTES # BLD AUTO: 1.82 K/UL — SIGNIFICANT CHANGE UP (ref 1–3.3)
LYMPHOCYTES # BLD AUTO: 21.4 % — SIGNIFICANT CHANGE UP (ref 13–44)
MCHC RBC-ENTMCNC: 28.6 PG — SIGNIFICANT CHANGE UP (ref 27–34)
MCHC RBC-ENTMCNC: 33.3 GM/DL — SIGNIFICANT CHANGE UP (ref 32–36)
MCV RBC AUTO: 85.8 FL — SIGNIFICANT CHANGE UP (ref 80–100)
MONOCYTES # BLD AUTO: 0.42 K/UL — SIGNIFICANT CHANGE UP (ref 0–0.9)
MONOCYTES NFR BLD AUTO: 4.9 % — SIGNIFICANT CHANGE UP (ref 2–14)
NEUTROPHILS # BLD AUTO: 5.97 K/UL — SIGNIFICANT CHANGE UP (ref 1.8–7.4)
NEUTROPHILS NFR BLD AUTO: 70.2 % — SIGNIFICANT CHANGE UP (ref 43–77)
NMDA-R AB CBA, CSF: NEGATIVE — SIGNIFICANT CHANGE UP
PCA-TR AB TITR CSF: NEGATIVE TITER — SIGNIFICANT CHANGE UP
PLATELET # BLD AUTO: 196 K/UL — SIGNIFICANT CHANGE UP (ref 150–400)
POTASSIUM SERPL-MCNC: 3.7 MMOL/L — SIGNIFICANT CHANGE UP (ref 3.5–5.3)
POTASSIUM SERPL-SCNC: 3.7 MMOL/L — SIGNIFICANT CHANGE UP (ref 3.5–5.3)
PROT SERPL-MCNC: 6.3 G/DL — SIGNIFICANT CHANGE UP (ref 6–8.3)
PURKINJE CELL CYTOPLASMIC AB TYPE 2: NEGATIVE TITER — SIGNIFICANT CHANGE UP
PURKINJE CELLS AB TITR CSF IF: NEGATIVE TITER — SIGNIFICANT CHANGE UP
RBC # BLD: 4.23 M/UL — SIGNIFICANT CHANGE UP (ref 3.8–5.2)
RBC # FLD: 12.8 % — SIGNIFICANT CHANGE UP (ref 10.3–14.5)
REFLEX ADDED: SIGNIFICANT CHANGE UP
SODIUM SERPL-SCNC: 138 MMOL/L — SIGNIFICANT CHANGE UP (ref 135–145)
T3 SERPL-MCNC: 148 NG/DL — SIGNIFICANT CHANGE UP (ref 80–200)
T4 AB SER-ACNC: 8.2 UG/DL — SIGNIFICANT CHANGE UP (ref 4.6–12)
TSH SERPL-MCNC: 3.22 UIU/ML — SIGNIFICANT CHANGE UP (ref 0.27–4.2)
VGKC AB CSF-SCNC: 0 NMOL/L — SIGNIFICANT CHANGE UP (ref 0–0.02)
VIT B12 SERPL-MCNC: 273 PG/ML — SIGNIFICANT CHANGE UP (ref 232–1245)
WBC # BLD: 8.51 K/UL — SIGNIFICANT CHANGE UP (ref 3.8–10.5)
WBC # FLD AUTO: 8.51 K/UL — SIGNIFICANT CHANGE UP (ref 3.8–10.5)

## 2019-04-19 PROCEDURE — 99233 SBSQ HOSP IP/OBS HIGH 50: CPT

## 2019-04-19 PROCEDURE — 72156 MRI NECK SPINE W/O & W/DYE: CPT | Mod: 26

## 2019-04-19 PROCEDURE — 70553 MRI BRAIN STEM W/O & W/DYE: CPT | Mod: 26

## 2019-04-19 PROCEDURE — 72157 MRI CHEST SPINE W/O & W/DYE: CPT | Mod: 26

## 2019-04-19 RX ORDER — LANOLIN ALCOHOL/MO/W.PET/CERES
3 CREAM (GRAM) TOPICAL AT BEDTIME
Qty: 0 | Refills: 0 | Status: DISCONTINUED | OUTPATIENT
Start: 2019-04-19 | End: 2019-04-25

## 2019-04-19 RX ORDER — METOCLOPRAMIDE HCL 10 MG
10 TABLET ORAL ONCE
Qty: 0 | Refills: 0 | Status: COMPLETED | OUTPATIENT
Start: 2019-04-19 | End: 2019-04-19

## 2019-04-19 RX ORDER — DIPHENHYDRAMINE HCL 50 MG
50 CAPSULE ORAL ONCE
Qty: 0 | Refills: 0 | Status: COMPLETED | OUTPATIENT
Start: 2019-04-19 | End: 2019-04-19

## 2019-04-19 RX ADMIN — ONDANSETRON 4 MILLIGRAM(S): 8 TABLET, FILM COATED ORAL at 06:52

## 2019-04-19 RX ADMIN — ONDANSETRON 4 MILLIGRAM(S): 8 TABLET, FILM COATED ORAL at 21:48

## 2019-04-19 RX ADMIN — Medication 10 MILLIGRAM(S): at 02:41

## 2019-04-19 RX ADMIN — SODIUM CHLORIDE 95 MILLILITER(S): 9 INJECTION INTRAMUSCULAR; INTRAVENOUS; SUBCUTANEOUS at 13:32

## 2019-04-19 RX ADMIN — Medication 3 MILLIGRAM(S): at 00:30

## 2019-04-19 RX ADMIN — Medication 50 MILLIGRAM(S): at 23:33

## 2019-04-19 RX ADMIN — ONDANSETRON 4 MILLIGRAM(S): 8 TABLET, FILM COATED ORAL at 13:32

## 2019-04-19 NOTE — PROGRESS NOTE ADULT - ASSESSMENT
28yo woman with a PMHx of ITP presents to ED for c/o headache. 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   CT head showed worsening edema compared to prior MRI brain.      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     Plan:  MRI brain with and without contrast   MRI cervical/thoracic  spine with and without contrast   Symptomatic treatment for headache 30yo woman with a PMHx of ITP presents to ED for c/o headache. 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   CT head showed worsening edema compared to prior MRI brain. Exam showed keiry;s sign in RUE and relative babinski on right.       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     Plan:  MRI brain with and without contrast   MRI cervical/thoracic  spine with and without contrast   Symptomatic treatment for headache

## 2019-04-19 NOTE — PROGRESS NOTE ADULT - SUBJECTIVE AND OBJECTIVE BOX
Neurology Follow up note    Subjective: Interval History - No events overnight. reported improvement in headache/     Objective:     Vital Signs Last 24 Hrs  T(C): 37.2 (19 Apr 2019 13:00), Max: 37.2 (19 Apr 2019 13:00)  T(F): 99 (19 Apr 2019 13:00), Max: 99 (19 Apr 2019 13:00)  HR: 81 (19 Apr 2019 13:00) (71 - 91)  BP: 126/86 (19 Apr 2019 13:00) (114/81 - 145/80)  BP(mean): --  RR: 18 (19 Apr 2019 13:00) (16 - 18)  SpO2: 99% (19 Apr 2019 13:00) (97% - 99%)    General: lying in bed comfortably, NAD.    Neurological Exam:    Mental Status: Orientated to self, date and place.  Attention intact.   PERRL, EOMI, v1-3 intact, no facial assymetry, tongue midline   5/5 in all four extremitits   Snehal positive RUE, LUE 3+  Babanski relative positive comapred to left ( left downgoging, right mute)   Gait : deferred      Radiology    CT head     < from: CT Head No Cont (04.18.19 @ 19:57) >  IMPRESSION: Left frontoparietal periventricular white matter lucency with   mass impression on the left lateral ventricle suspicious for a large   demyelinating lesion when compared with the patient's prior MR 4/14/2019.   No other lesions are seen      < end of copied text >      MEDICATIONS  (STANDING):  enoxaparin Injectable 40 milliGRAM(s) SubCutaneous every 24 hours  sodium chloride 0.9%. 1000 milliLiter(s) (120 mL/Hr) IV Continuous <Continuous>    MEDICATIONS  (PRN):  acetaminophen   Tablet .. 650 milliGRAM(s) Oral every 6 hours PRN Mild Pain (1 - 3)  acetaminophen   Tablet .. 650 milliGRAM(s) Oral every 6 hours PRN Temp greater or equal to 38C (100.4F), Mild Pain (1 - 3), Moderate Pain (4 - 6)  diphenhydrAMINE 50 milliGRAM(s) Oral every 6 hours PRN Allergy or Insomnia

## 2019-04-20 PROCEDURE — 99232 SBSQ HOSP IP/OBS MODERATE 35: CPT

## 2019-04-20 RX ORDER — GABAPENTIN 400 MG/1
100 CAPSULE ORAL THREE TIMES A DAY
Qty: 0 | Refills: 0 | Status: DISCONTINUED | OUTPATIENT
Start: 2019-04-20 | End: 2019-04-25

## 2019-04-20 RX ADMIN — ONDANSETRON 4 MILLIGRAM(S): 8 TABLET, FILM COATED ORAL at 22:14

## 2019-04-20 RX ADMIN — Medication 30 MILLIGRAM(S): at 02:39

## 2019-04-20 RX ADMIN — ONDANSETRON 4 MILLIGRAM(S): 8 TABLET, FILM COATED ORAL at 07:13

## 2019-04-20 RX ADMIN — Medication 3 MILLIGRAM(S): at 22:15

## 2019-04-20 RX ADMIN — GABAPENTIN 100 MILLIGRAM(S): 400 CAPSULE ORAL at 22:15

## 2019-04-20 RX ADMIN — GABAPENTIN 100 MILLIGRAM(S): 400 CAPSULE ORAL at 07:13

## 2019-04-20 RX ADMIN — ONDANSETRON 4 MILLIGRAM(S): 8 TABLET, FILM COATED ORAL at 12:44

## 2019-04-20 RX ADMIN — Medication 650 MILLIGRAM(S): at 02:39

## 2019-04-20 RX ADMIN — Medication 30 MILLIGRAM(S): at 02:08

## 2019-04-20 RX ADMIN — Medication 650 MILLIGRAM(S): at 01:53

## 2019-04-20 RX ADMIN — Medication 1 MILLIGRAM(S): at 02:37

## 2019-04-20 RX ADMIN — Medication 650 MILLIGRAM(S): at 20:14

## 2019-04-20 RX ADMIN — GABAPENTIN 100 MILLIGRAM(S): 400 CAPSULE ORAL at 12:44

## 2019-04-20 NOTE — PROGRESS NOTE ADULT - SUBJECTIVE AND OBJECTIVE BOX
Neurology Follow up note    Subjective: Interval History - overnight patient was c/o back pain, was given pain medication. She was anxious.     Objective:     Vital Signs Last 24 Hrs  T(C): 36.9 (20 Apr 2019 08:49), Max: 37.4 (19 Apr 2019 16:32)  T(F): 98.4 (20 Apr 2019 08:49), Max: 99.3 (19 Apr 2019 16:32)  HR: 65 (20 Apr 2019 08:49) (65 - 102)  BP: 104/69 (20 Apr 2019 08:49) (104/69 - 145/105)  BP(mean): --  RR: 18 (20 Apr 2019 08:49) (18 - 18)  SpO2: 99% (20 Apr 2019 08:49) (96% - 100%)    General: lying in bed comfortably, NAD.    Neurological Exam:    Mental Status: Orientated to self, date and place.  Attention intact.   PERRL, EOMI, v1-3 intact, no facial assymetry, tongue midline   5/5 in all four extremitits   Snehal positive RUE, LUE 3+  Babanski relative positive comapred to left ( left downgoging, right mute)   Gait : deferred      Radiology    CT head     < from: CT Head No Cont (04.18.19 @ 19:57) >  IMPRESSION: Left frontoparietal periventricular white matter lucency with   mass impression on the left lateral ventricle suspicious for a large   demyelinating lesion when compared with the patient's prior MR 4/14/2019.   No other lesions are seen      < end of copied text >      < from: MR Head w/wo IV Cont (04.19.19 @ 21:17) >  In comparison to 4/14/2019, increased size, enhancement and hyperintense   T2 and FLAIR signal likely edema within the left posterior frontal and   parietal periventricular white matter lesion containing central rounded   foci of hyperintense T2 signal with heterogeneity possibly necrosis,   differential includes neoplasm, additional etiologies including atypical   aggressive demyelinating plaques with Balo's concentric sclerosis, also   not completely excluded correlate with CSF serologies.    Redemonstration of unchanged foci of hyperintense T2 signal within the   right periventricular white matter.    Cervical and thoracic spinal MRI are unremarkable. Findings discussed   with Dr. Fleming at immediate time of review on 4/20/2019 at 7:55 AM.          < end of copied text >      MEDICATIONS  (STANDING):  gabapentin 100 milliGRAM(s) Oral three times a day  magnesium sulfate  IVPB 1 Gram(s) IV Intermittent once  melatonin 3 milliGRAM(s) Oral at bedtime  ondansetron Injectable 4 milliGRAM(s) IV Push every 8 hours  sodium chloride 0.9%. 1000 milliLiter(s) (95 mL/Hr) IV Continuous <Continuous>    MEDICATIONS  (PRN):  acetaminophen   Tablet .. 650 milliGRAM(s) Oral every 6 hours PRN Temp greater or equal to 38C (100.4F), Mild Pain (1 - 3)

## 2019-04-20 NOTE — CONSULT NOTE ADULT - ASSESSMENT
30 yo woman with a hx of childhood ITP, anxiety, presenting with right-sided vision loss, imaging concerning for MS vs. lymphoma, with slightly enlarged left supraclavicular lymph nodes on CT chest.    - recommend IR consult for core needle biopsy and marker placement. Given the small size of this node, as well as it being non-palpable, intraop localization and excision would be challenging  - consider PET scan to evaluate supraclavicular and retroperitoneal lymphadenopathy for AVID uptake  - care per primary team  - surgery will continue to follow    Patient discussed with Dr. Antunez    [facility]  [phone]

## 2019-04-20 NOTE — CONSULT NOTE ADULT - SUBJECTIVE AND OBJECTIVE BOX
Consulting surgical team: [facility], [phone]  Consulting attending: Dr. Antunez    HPI:  28 yo woman with a PMH of childhood ITP (treated with steroids, no longer taking) and anxiety presenting with blurry right-sided vision, found on imaging to have a lesions concerning for lymphoma vs. MS, as well as enlarged (0.5 cm) left-sided supraclavicular lymph nodes. Surgery consulted for excisional biopsy.      PAST MEDICAL HISTORY:  Thrombocytopenia  Anxiety      PAST SURGICAL HISTORY:  History of cholecystectomy      MEDICATIONS:  acetaminophen   Tablet .. 650 milliGRAM(s) Oral every 6 hours PRN  gabapentin 100 milliGRAM(s) Oral three times a day  magnesium sulfate  IVPB 1 Gram(s) IV Intermittent once  melatonin 3 milliGRAM(s) Oral at bedtime  ondansetron Injectable 4 milliGRAM(s) IV Push every 8 hours  sodium chloride 0.9%. 1000 milliLiter(s) IV Continuous <Continuous>      ALLERGIES:  sulfa drugs (Hives)      VITALS & I/Os:  Vital Signs Last 24 Hrs  T(C): 36.9 (20 Apr 2019 08:49), Max: 37.4 (19 Apr 2019 16:32)  T(F): 98.4 (20 Apr 2019 08:49), Max: 99.3 (19 Apr 2019 16:32)  HR: 65 (20 Apr 2019 08:49) (65 - 102)  BP: 104/69 (20 Apr 2019 08:49) (104/69 - 145/105)  BP(mean): --  RR: 18 (20 Apr 2019 08:49) (18 - 18)  SpO2: 99% (20 Apr 2019 08:49) (96% - 100%)    I&O's Summary    19 Apr 2019 07:01  -  20 Apr 2019 07:00  --------------------------------------------------------  IN: 360 mL / OUT: 0 mL / NET: 360 mL        PHYSICAL EXAM:  GEN: NAD, resting quietly  HEENT: NCAT, no palpable lymphadenopathy or masses  PULM: symmetric chest rise bilaterally, no increased WOB  CV: regular rate, peripheral pulses intact  ABD: soft, NTND  EXTR: no cyanosis or edema, moving all extremities    LABS:                        12.1   8.51  )-----------( 196      ( 19 Apr 2019 09:39 )             36.3     04-19    138  |  104  |  <4<L>  ----------------------------<  79  3.7   |  20<L>  |  0.54    Ca    8.2<L>      19 Apr 2019 05:44    TPro  6.3  /  Alb  4.0  /  TBili  0.4  /  DBili  x   /  AST  22  /  ALT  18  /  AlkPhos  48  04-19    Lactate:    PT/INR - ( 18 Apr 2019 16:43 )   PT: 11.8 sec;   INR: 1.03 ratio         PTT - ( 18 Apr 2019 16:43 )  PTT:41.7 sec      IMAGING:  < from: CT Head No Cont (04.18.19 @ 19:57) >  IMPRESSION: Left frontoparietal periventricular white matter lucency with   mass impression on the left lateral ventricle suspicious for a large   demyelinating lesion when compared with the patient's prior MR 4/14/2019.   No other lesions are seen    < end of copied text >    < from: CT Chest w/ IV Cont (04.15.19 @ 17:10) >  IMPRESSION:     Nonspecificsubcentimeter retroperitoneal and supraclavicular lymph   nodes. No lymphadenopathy in the chest, abdomen, or pelvis.    Left adnexal dermoid cyst.    < end of copied text >    < from: CT Abdomen and Pelvis w/ IV Cont (04.15.19 @ 17:10) >  IMPRESSION:     Nonspecificsubcentimeter retroperitoneal and supraclavicular lymph   nodes. No lymphadenopathy in the chest, abdomen, or pelvis.    Left adnexal dermoid cyst.    < end of copied text >

## 2019-04-20 NOTE — PROGRESS NOTE ADULT - ASSESSMENT
28yo woman with a PMHx of ITP presents to ED for c/o headache. 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 CT head showed worsening edema compared to prior MRI brain. Exam showed keiry;s sign in RUE and relative babinski on right. MRI brain showed increase size of enhancement compared to prior MRI brain. MRI cervical spine and T spine are unremarkable.       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     Plan:    Surgery consult for excisional biopsy for left supraclavicular lymph node.   if biopsy is negative, she will go for brain biopsy.   Symptomatic treatment for headache   psych consult for anxiety  prn anxiolytics Xanax 0.5 mg TID prn

## 2019-04-21 PROCEDURE — 93010 ELECTROCARDIOGRAM REPORT: CPT

## 2019-04-21 PROCEDURE — 99232 SBSQ HOSP IP/OBS MODERATE 35: CPT

## 2019-04-21 PROCEDURE — 99223 1ST HOSP IP/OBS HIGH 75: CPT | Mod: GC

## 2019-04-21 RX ADMIN — GABAPENTIN 100 MILLIGRAM(S): 400 CAPSULE ORAL at 13:27

## 2019-04-21 RX ADMIN — GABAPENTIN 100 MILLIGRAM(S): 400 CAPSULE ORAL at 22:13

## 2019-04-21 RX ADMIN — ONDANSETRON 4 MILLIGRAM(S): 8 TABLET, FILM COATED ORAL at 22:13

## 2019-04-21 RX ADMIN — GABAPENTIN 100 MILLIGRAM(S): 400 CAPSULE ORAL at 07:23

## 2019-04-21 RX ADMIN — ONDANSETRON 4 MILLIGRAM(S): 8 TABLET, FILM COATED ORAL at 07:23

## 2019-04-21 RX ADMIN — ONDANSETRON 4 MILLIGRAM(S): 8 TABLET, FILM COATED ORAL at 13:27

## 2019-04-21 NOTE — CONSULT NOTE ADULT - SUBJECTIVE AND OBJECTIVE BOX
HPI:  Pt is a 30 yo F with a PMH anxiety, ITP, childhood migraine presents with worsening HA, nausea for 2 days after being discharged on 4/16 during which she got an LP on 4/14. On 4/17 HA became unbearable with few episodes of nausea, emesis, photophobia and HA which worsens with sitting or standing upright improves with laying flat. On discharge, pt had mild HA which was presumed to be post-LP.    Pt was admitted previously for R visual field cut , mild HA for 2 weeks as well as outpatient MRI showing scattered lesions, single large right periventricular hyperintense lesion with contrast enhancing ring; old lesions present in right frontal lobe. MRI brain showed T2 flair lesion in left parietal region extending into left posterior frontal that enhances with contrast. LP was done, CSF showed protein 50, cell count is 1, differential cell count includes lymphocytosis. CSF PCR was negative. Pt admitted to Neurology for further management. (18 Apr 2019 16:50)      PAST MEDICAL & SURGICAL HISTORY:  Thrombocytopenia  Anxiety  History of cholecystectomy      Review of Systems:   CONSTITUTIONAL: No fever, weight loss, or fatigue  EYES: No eye pain, visual disturbances, or discharge  ENMT:  No difficulty hearing, tinnitus, vertigo; No sinus or throat pain  NECK: No pain or stiffness  BREASTS: No pain, masses, or nipple discharge  RESPIRATORY: No cough, wheezing, chills or hemoptysis; No shortness of breath  CARDIOVASCULAR: No chest pain, palpitations, dizziness, or leg swelling  GASTROINTESTINAL: No abdominal or epigastric pain. No nausea, vomiting, or hematemesis; No diarrhea or constipation. No melena or hematochezia.  GENITOURINARY: No dysuria, frequency, hematuria, or incontinence  NEUROLOGICAL: No headaches, memory loss, loss of strength, numbness, or tremors  SKIN: No itching, burning, rashes, or lesions   LYMPH NODES: No enlarged glands  ENDOCRINE: No heat or cold intolerance; No hair loss  MUSCULOSKELETAL: No joint pain or swelling; No muscle, back, or extremity pain  PSYCHIATRIC: No depression, anxiety, mood swings, or difficulty sleeping  HEME/LYMPH: No easy bruising, or bleeding gums  ALLERY AND IMMUNOLOGIC: No hives or eczema    Allergies    sulfa drugs (Hives)    Intolerances        Social History:     FAMILY HISTORY:  Family history of heart disease (Grandparent)      MEDICATIONS  (STANDING):  gabapentin 100 milliGRAM(s) Oral three times a day  magnesium sulfate  IVPB 1 Gram(s) IV Intermittent once  melatonin 3 milliGRAM(s) Oral at bedtime  ondansetron Injectable 4 milliGRAM(s) IV Push every 8 hours  sodium chloride 0.9%. 1000 milliLiter(s) (95 mL/Hr) IV Continuous <Continuous>    MEDICATIONS  (PRN):  acetaminophen   Tablet .. 650 milliGRAM(s) Oral every 6 hours PRN Temp greater or equal to 38C (100.4F), Mild Pain (1 - 3)  LORazepam     Tablet 0.5 milliGRAM(s) Oral three times a day PRN Anxiety        CAPILLARY BLOOD GLUCOSE        I&O's Summary    20 Apr 2019 07:01  -  21 Apr 2019 07:00  --------------------------------------------------------  IN: 220 mL / OUT: 0 mL / NET: 220 mL        PHYSICAL EXAM:  GENERAL: NAD, well-developed  HEAD:  Atraumatic, Normocephalic  EYES: EOMI, PERRLA, conjunctiva and sclera clear  NECK: Supple, No JVD  CHEST/LUNG: Clear to auscultation bilaterally; No wheeze  HEART: Regular rate and rhythm; No murmurs, rubs, or gallops  ABDOMEN: Soft, Nontender, Nondistended; Bowel sounds present  EXTREMITIES:  2+ Peripheral Pulses, No clubbing, cyanosis, or edema  PSYCH: AAOx3  NEUROLOGY: non-focal  SKIN: No rashes or lesions    LABS:                    RADIOLOGY & ADDITIONAL TESTS:    Imaging Personally Reviewed:    Consultant(s) Notes Reviewed:      Care Discussed with Consultants/Other Providers: HPI:  Pt is a 28 yo F with a PMH anxiety, ITP, childhood migraine who presented with worsening HA, nausea for 2 days after being discharged on 4/16 during which she had an LP on 4/14. She developed an unbearable headache on 4/17 with episodes n/v, photophobia improved when supine. At this time she feels well, denying headache or new weakness/numbness/confusion. Her nausea and vomiting have resolved. She has no had fevers but endorses occasional chills, and she denies diarrhea, constipation dysuria, flank pain, hx of head trauma, new visual changes, changes in taste or smell, hearing loss, muscle pain or weakness, joint swelling or pain.     Her original workup with LP was begun after patient was found to have R visual field cut with outside specialists for which she was sent for an MRI brain. The MRI showed scattered lesions with single large R periventricular hyperintense lesion with contrast enhancing ring. Old lesions were present in right frontal lobe. CSF showed protein 50, cell count is 1, lymphocytosis. CSF PCR was negative. She was admitted to Neurology service at that time.     Regarding her hx of ITP, she last followed with a Dr. Starkey (sp?) about 3 years ago. She denies having ever required transfusions.     PAST MEDICAL & SURGICAL HISTORY:  Thrombocytopenia  Anxiety  History of cholecystectomy      Review of Systems:   CONSTITUTIONAL: No fever, weight loss, or fatigue  EYES: No eye pain, visual disturbances, or discharge  ENMT:  No difficulty hearing, tinnitus, vertigo; No sinus or throat pain  NECK: No pain or stiffness  BREASTS: No pain, masses, or nipple discharge  RESPIRATORY: No cough, wheezing, chills or hemoptysis; No shortness of breath  CARDIOVASCULAR: No chest pain, palpitations, dizziness, or leg swelling  GASTROINTESTINAL: No abdominal or epigastric pain. No nausea, vomiting, or hematemesis; No diarrhea or constipation. No melena or hematochezia.  GENITOURINARY: No dysuria, frequency, hematuria, or incontinence  NEUROLOGICAL: +resolved headaches, R visual field deficits; no memory loss, loss of strength, numbness, or tremors  SKIN: No itching, burning, rashes, or lesions   LYMPH NODES: No enlarged glands  ENDOCRINE: No heat or cold intolerance; No hair loss  MUSCULOSKELETAL: No joint pain or swelling; No muscle, back, or extremity pain  PSYCHIATRIC: No depression, anxiety, mood swings, or difficulty sleeping  HEME/LYMPH: No easy bruising, or bleeding gums  ALLERY AND IMMUNOLOGIC: No hives or eczema    Allergies  sulfa drugs (Hives)    Intolerances: n/a    Social History:   EtoH: 1-2 drinks per month, socially  Marijuana: last used 3-4 days ago  Tobacco: denies    FAMILY HISTORY:  Family history of heart disease (Grandparent)      MEDICATIONS  (STANDING):  gabapentin 100 milliGRAM(s) Oral three times a day  magnesium sulfate  IVPB 1 Gram(s) IV Intermittent once  melatonin 3 milliGRAM(s) Oral at bedtime  ondansetron Injectable 4 milliGRAM(s) IV Push every 8 hours  sodium chloride 0.9%. 1000 milliLiter(s) (95 mL/Hr) IV Continuous <Continuous>    MEDICATIONS  (PRN):  acetaminophen   Tablet .. 650 milliGRAM(s) Oral every 6 hours PRN Temp greater or equal to 38C (100.4F), Mild Pain (1 - 3)  LORazepam     Tablet 0.5 milliGRAM(s) Oral three times a day PRN Anxiety    CAPILLARY BLOOD GLUCOSE: n/a    I&O's Summary    20 Apr 2019 07:01  -  21 Apr 2019 07:00  --------------------------------------------------------  IN: 220 mL / OUT: 0 mL / NET: 220 mL        PHYSICAL EXAM:  GENERAL: NAD, well-developed  HEAD:  Atraumatic, Normocephalic  EYES: EOMI, PERRLA, conjunctiva and sclera clear  NECK: Supple, No JVD  CHEST/LUNG: Clear to auscultation bilaterally; No wheeze  HEART: Regular rate and rhythm; No murmurs, rubs, or gallops  ABDOMEN: Soft, Nontender, Nondistended; Bowel sounds present  EXTREMITIES:  2+ Peripheral Pulses, No clubbing, cyanosis, or edema  PSYCH: AAOx3  NEUROLOGY: non-focal  SKIN: No rashes or lesions    LABS:  No new labs past 24 hours.   Labs from 4/19 reviewed.   CSF labs from 4/14 reviewed, negative except for West Nile IgG positive, West Nile IgM negative.   CSF cultures NGTD.  Flow Cytometry Order. (04.14.19 @ 04:50)    TM Interpretation:   Flow Cytometry Final Report  ________________________________________________________________________  Specimen: CSF  Collected: 04/14/2019 4:50  Received: 04/14/2019 4:50  Processed: 04/15/2019 8:30  Reported: 04/15/2019 12:57  Accession #: [id number]  FL -RP  ________________________________________________________________________  CLINICAL DATA: Rule out lymphoproliferative disorder    ________________________________________________________________________  DIAGNOSIS:  Cerebrospinal fluid:       - The lymphocyte immunophenotypic findings show no diagnostic abnormalities.  Please see interpretation.    INTERPRETATION:  MORPHOLOGY:  CYTOSPIN: Lymphocytes, monocytes, few degenerated cells and rare RBC in the background    IMMUNOPHENOTYPE: Lymphocytes (14% of cells): Heterogeneous population of T-cells (with normal CD4  to CD8 ratio) and rare/absent B-cells.    The lymphocyte immunophenotypic findings show no diagnostic abnormalities. Correlation with  cytology as well as clinical/laboratory findings is necessary.  _____________________________________________________________________  Viability ................. Insufficient cells to report viability    Values reported are based on the lymphocyte gate. (Bright CD45 positive; low side scatter, low  forward scatter).  14% of cells.  CD45 .......... 100 %  CD3 ........... 80 %  CD5 ........... 79 %  CD7 ........... 68 %  CD4 ........... 62 %  CD8 ........... 20 %  CD10 .......... 1 %  CD19 .......... 1 %  CD20 .......... 1 %  kappa ......... 1 %  lambda ........ 1 %    Verified By: Natividad Davies M.D., M.D.  (Electronic Signature)This test was developed and its performance characteristics determined by the  Flow Cytometry Laboratory at [facility]. It has not been cleared or approved by the U.S.  Food and Drug Administration.The FDA has determined that such clearance or approval is not  necessary. This test is used for clinical purposes. It should not be regarded as investigational or  for research. This laboratory is certified under the Clinical Laboratory Improvement Amendment of  1988 ("CLIA") as qualified to perform high complexity clinical testing.      CAPILLARY BLOOD GLUCOSE: n/a      BLOOD CULTURE: n/a    RADIOLOGY & ADDITIONAL TESTS:    Imaging Personally Reviewed:  [ x] YES   < from: MR Thoracic Spine w/wo IV Cont (04.19.19 @ 21:24) >  IMPRESSION:    In comparison to 4/14/2019, increased size, enhancement and hyperintense   T2 and FLAIR signal likely edema within the left posterior frontal and   parietal periventricular white matter lesion containing central rounded   foci of hyperintense T2 signal with heterogeneity possibly necrosis,   differential includes neoplasm, additional etiologies including atypical   aggressive demyelinating plaques with Balo's concentric sclerosis, also   not completely excluded correlate with CSF serologies.    Redemonstration of unchanged foci of hyperintense T2 signal within the   right periventricular white matter.    Cervical and thoracic spinal MRI are unremarkable. Findings discussed   with Dr. Fleming at immediate time of review on 4/20/2019 at 7:55 AM.    < end of copied text >    < from: CT Abdomen and Pelvis w/ IV Cont (04.15.19 @ 17:10) >  IMPRESSION:     Nonspecificsubcentimeter retroperitoneal and supraclavicular lymph   nodes. No lymphadenopathy in the chest, abdomen, or pelvis.    Left adnexal dermoid cyst.    < end of copied text > HPI:  Pt is a 28 yo F with a PMH anxiety, ITP, childhood migraine who presented with worsening HA, nausea for 2 days after being discharged on 4/16 during which she had an LP on 4/14. She developed an unbearable headache on 4/17 with episodes n/v, photophobia improved when supine. At this time she feels well, denying headache or new weakness/numbness/confusion. Her nausea and vomiting have resolved. She has no had fevers but endorses occasional chills, and she denies diarrhea, constipation dysuria, flank pain, hx of head trauma, new visual changes, changes in taste or smell, hearing loss, muscle pain or weakness, joint swelling or pain.     Her original workup with LP was begun after patient was found to have R visual field cut with outside specialists for which she was sent for an MRI brain. The MRI showed scattered lesions with single large R periventricular hyperintense lesion with contrast enhancing ring. Old lesions were present in right frontal lobe. CSF showed protein 50, cell count is 1, lymphocytosis. CSF PCR was negative. She was admitted to Neurology service at that time.     Regarding her hx of ITP, she last followed with a Dr. Starkey (sp?) about 3 years ago. She denies having ever required transfusions.     PAST MEDICAL & SURGICAL HISTORY:  Thrombocytopenia  Anxiety  History of cholecystectomy      Review of Systems:   CONSTITUTIONAL: No fever, weight loss, or fatigue  EYES: No eye pain, visual disturbances, or discharge  ENMT:  No difficulty hearing, tinnitus, vertigo; No sinus or throat pain  NECK: No pain or stiffness  BREASTS: No pain, masses, or nipple discharge  RESPIRATORY: No cough, wheezing, chills or hemoptysis; No shortness of breath  CARDIOVASCULAR: No chest pain, palpitations, dizziness, or leg swelling  GASTROINTESTINAL: No abdominal or epigastric pain. No nausea, vomiting, or hematemesis; No diarrhea or constipation. No melena or hematochezia.  GENITOURINARY: No dysuria, frequency, hematuria, or incontinence  NEUROLOGICAL: +resolved headaches, R visual field deficits; no memory loss, loss of strength, numbness, or tremors  SKIN: No itching, burning, rashes, or lesions   LYMPH NODES: No enlarged glands  ENDOCRINE: No heat or cold intolerance; No hair loss  MUSCULOSKELETAL: No joint pain or swelling; No muscle, back, or extremity pain  PSYCHIATRIC: No depression, anxiety, mood swings, or difficulty sleeping  HEME/LYMPH: No easy bruising, or bleeding gums  ALLERY AND IMMUNOLOGIC: No hives or eczema    Allergies  sulfa drugs (Hives)    Intolerances: n/a    Social History:   EtoH: 1-2 drinks per month, socially  Marijuana: last used 3-4 days ago  Tobacco: denies    FAMILY HISTORY:  Family history of heart disease (Grandparent)      MEDICATIONS  (STANDING):  gabapentin 100 milliGRAM(s) Oral three times a day  magnesium sulfate  IVPB 1 Gram(s) IV Intermittent once  melatonin 3 milliGRAM(s) Oral at bedtime  ondansetron Injectable 4 milliGRAM(s) IV Push every 8 hours  sodium chloride 0.9%. 1000 milliLiter(s) (95 mL/Hr) IV Continuous <Continuous>    MEDICATIONS  (PRN):  acetaminophen   Tablet .. 650 milliGRAM(s) Oral every 6 hours PRN Temp greater or equal to 38C (100.4F), Mild Pain (1 - 3)  LORazepam     Tablet 0.5 milliGRAM(s) Oral three times a day PRN Anxiety    CAPILLARY BLOOD GLUCOSE: n/a    I&O's Summary    20 Apr 2019 07:01  -  21 Apr 2019 07:00  --------------------------------------------------------  IN: 220 mL / OUT: 0 mL / NET: 220 mL        PHYSICAL EXAM:  GENERAL: NAD, well-developed  HEAD:  Atraumatic, Normocephalic  EYES: EOMI, PERRLA, conjunctiva and sclera clear  NECK: Supple, No JVD  CHEST/LUNG: Clear to auscultation bilaterally; No wheeze  HEART: Regular rate and rhythm; No murmurs, rubs, or gallops  ABDOMEN: Soft, Nontender, Nondistended; Bowel sounds present  EXTREMITIES:  2+ Peripheral Pulses, No clubbing, cyanosis, or edema  PSYCH: AAOx3  NEUROLOGY: non-focal, 5/5 hip flexors, dorsiflexion, plantarflexion, biceps, triceps,  strength. No facial droop, midline tongue.  SKIN: tattoos; No rashes or lesions    LABS:  No new labs past 24 hours.   Labs from 4/19 reviewed.   CSF labs from 4/14 reviewed, negative except for West Nile IgG positive, West Nile IgM negative.   CSF cultures NGTD.  Flow Cytometry Order. (04.14.19 @ 04:50)    TM Interpretation:   Flow Cytometry Final Report  ________________________________________________________________________  Specimen: CSF  Collected: 04/14/2019 4:50  Received: 04/14/2019 4:50  Processed: 04/15/2019 8:30  Reported: 04/15/2019 12:57  Accession #: [id number]  FL -RP  ________________________________________________________________________  CLINICAL DATA: Rule out lymphoproliferative disorder    ________________________________________________________________________  DIAGNOSIS:  Cerebrospinal fluid:       - The lymphocyte immunophenotypic findings show no diagnostic abnormalities.  Please see interpretation.    INTERPRETATION:  MORPHOLOGY:  CYTOSPIN: Lymphocytes, monocytes, few degenerated cells and rare RBC in the background    IMMUNOPHENOTYPE: Lymphocytes (14% of cells): Heterogeneous population of T-cells (with normal CD4  to CD8 ratio) and rare/absent B-cells.    The lymphocyte immunophenotypic findings show no diagnostic abnormalities. Correlation with  cytology as well as clinical/laboratory findings is necessary.  _____________________________________________________________________  Viability ................. Insufficient cells to report viability    Values reported are based on the lymphocyte gate. (Bright CD45 positive; low side scatter, low  forward scatter).  14% of cells.  CD45 .......... 100 %  CD3 ........... 80 %  CD5 ........... 79 %  CD7 ........... 68 %  CD4 ........... 62 %  CD8 ........... 20 %  CD10 .......... 1 %  CD19 .......... 1 %  CD20 .......... 1 %  kappa ......... 1 %  lambda ........ 1 %    Verified By: Natividad Davies M.D., M.D.  (Electronic Signature)This test was developed and its performance characteristics determined by the  Flow Cytometry Laboratory at [facility]. It has not been cleared or approved by the U.S.  Food and Drug Administration.The FDA has determined that such clearance or approval is not  necessary. This test is used for clinical purposes. It should not be regarded as investigational or  for research. This laboratory is certified under the Clinical Laboratory Improvement Amendment of  1988 ("CLIA") as qualified to perform high complexity clinical testing.      CAPILLARY BLOOD GLUCOSE: n/a      BLOOD CULTURE: n/a    RADIOLOGY & ADDITIONAL TESTS:    Imaging Personally Reviewed:  [ x] YES   < from: MR Thoracic Spine w/wo IV Cont (04.19.19 @ 21:24) >  IMPRESSION:    In comparison to 4/14/2019, increased size, enhancement and hyperintense   T2 and FLAIR signal likely edema within the left posterior frontal and   parietal periventricular white matter lesion containing central rounded   foci of hyperintense T2 signal with heterogeneity possibly necrosis,   differential includes neoplasm, additional etiologies including atypical   aggressive demyelinating plaques with Balo's concentric sclerosis, also   not completely excluded correlate with CSF serologies.    Redemonstration of unchanged foci of hyperintense T2 signal within the   right periventricular white matter.    Cervical and thoracic spinal MRI are unremarkable. Findings discussed   with Dr. Fleming at immediate time of review on 4/20/2019 at 7:55 AM.    < end of copied text >    < from: CT Abdomen and Pelvis w/ IV Cont (04.15.19 @ 17:10) >  IMPRESSION:     Nonspecificsubcentimeter retroperitoneal and supraclavicular lymph   nodes. No lymphadenopathy in the chest, abdomen, or pelvis.    Left adnexal dermoid cyst.    < end of copied text > HPI: Pt is a 28 yo F with a PMH anxiety, ITP, childhood migraine who presented with worsening HA, nausea for 2 days after being discharged on 4/16 during which she had an LP on 4/14. She developed an unbearable headache on 4/17 with episodes n/v, photophobia improved when supine. At this time she feels well, denying headache or new weakness/numbness/confusion. Her nausea and vomiting have resolved. She has no had fevers but endorses occasional chills, and she denies diarrhea, constipation dysuria, flank pain, hx of head trauma, new visual changes, changes in taste or smell, hearing loss, muscle pain or weakness, joint swelling or pain.     Her original workup with LP was begun after patient was found to have R visual field cut with outside specialists for which she was sent for an MRI brain. The MRI showed scattered lesions with single large R periventricular hyperintense lesion with contrast enhancing ring. Old lesions were present in right frontal lobe. CSF showed protein 50, cell count is 1, lymphocytosis. CSF PCR was negative. She was admitted to Neurology service at that time.     Regarding her hx of ITP, she last followed with a Dr. Starkey (sp?) about 3 years ago. She denies having ever required transfusions.     Review of Systems:   CONSTITUTIONAL: No fever, weight loss, or fatigue  EYES: No eye pain, visual disturbances, or discharge  ENMT:  No difficulty hearing, tinnitus, vertigo; No sinus or throat pain  NECK: No pain or stiffness  BREASTS: No pain, masses, or nipple discharge  RESPIRATORY: No cough, wheezing, chills or hemoptysis; No shortness of breath  CARDIOVASCULAR: No chest pain, palpitations, dizziness, or leg swelling  GASTROINTESTINAL: No abdominal or epigastric pain. No nausea, vomiting, or hematemesis; No diarrhea or constipation. No melena or hematochezia.  GENITOURINARY: No dysuria, frequency, hematuria, or incontinence  NEUROLOGICAL: +resolved headaches, R visual field deficits; no memory loss, loss of strength, numbness, or tremors  SKIN: No itching, burning, rashes, or lesions   LYMPH NODES: No enlarged glands  ENDOCRINE: No heat or cold intolerance; No hair loss  MUSCULOSKELETAL: No joint pain or swelling; No muscle, back, or extremity pain  PSYCHIATRIC: No depression, anxiety, mood swings, or difficulty sleeping  HEME/LYMPH: No easy bruising, or bleeding gums  ALLERY AND IMMUNOLOGIC: No hives or eczema    PAST MEDICAL & SURGICAL HISTORY: Thrombocytopenia, Anxiety, History of cholecystectomy    Allergies: sulfa drugs (Hives)    Social History: EtoH: 1-2 drinks per month, socially; Marijuana: last used 3-4 days ago; Tobacco: denies    FAMILY HISTORY: Family history of heart disease (Grandparent)    PHYSICAL EXAMINATION:   I&O's Summary  20 Apr 2019 07:01  -  21 Apr 2019 07:00  --------------------------------------------------------  IN: 220 mL / OUT: 0 mL / NET: 220 mL    Vital Signs Last 24 Hrs  T(F): 98.6 (21 Apr 2019 15:50), Max: 99.2 (20 Apr 2019 20:23)  HR: 89 (21 Apr 2019 15:50) (69 - 94)  BP: 135/98 (21 Apr 2019 15:50) (99/66 - 135/98)  RR: 18 (21 Apr 2019 15:50) (17 - 18)  SpO2: 96% (21 Apr 2019 15:50) (96% - 99%)    GENERAL: NAD, well-developed  HEAD:  Atraumatic, Normocephalic  EYES: EOMI, PERRLA, conjunctiva and sclera clear  NECK: Supple, No JVD  CHEST/LUNG: Clear to auscultation bilaterally; No wheeze  HEART: Regular rate and rhythm; No murmurs, rubs, or gallops  ABDOMEN: Soft, Nontender, Nondistended; Bowel sounds present  EXTREMITIES:  2+ Peripheral Pulses, No clubbing, cyanosis, or edema  PSYCH: AAOx3  NEUROLOGY: non-focal, 5/5 hip flexors, dorsiflexion, plantarflexion, biceps, triceps,  strength. No facial droop, midline tongue.  SKIN: tattoos; No rashes or lesions    LABS:  No new labs past 24 hours.     Labs from 4/19 reviewed.     CSF labs from 4/14 reviewed, negative except for West Nile IgG positive, West Nile IgM negative.     CSF cultures NGTD.  Flow Cytometry Order. (04.14.19 @ 04:50)    TM Interpretation:   Flow Cytometry Final Report  ________________________________________________________________________  Specimen: CSF  Collected: 04/14/2019 4:50  Received: 04/14/2019 4:50  Processed: 04/15/2019 8:30  Reported: 04/15/2019 12:57  Accession #: [id number]  FL -RP  ________________________________________________________________________  CLINICAL DATA: Rule out lymphoproliferative disorder  ________________________________________________________________________  DIAGNOSIS:  Cerebrospinal fluid:       - The lymphocyte immunophenotypic findings show no diagnostic abnormalities.  Please see interpretation.    INTERPRETATION:  MORPHOLOGY:  CYTOSPIN: Lymphocytes, monocytes, few degenerated cells and rare RBC in the background    IMMUNOPHENOTYPE: Lymphocytes (14% of cells): Heterogeneous population of T-cells (with normal CD4 to CD8 ratio) and rare/absent B-cells.    The lymphocyte immunophenotypic findings show no diagnostic abnormalities. Correlation with cytology as well as clinical/laboratory findings is necessary.  _____________________________________________________________________  Viability ................. Insufficient cells to report viability    Values reported are based on the lymphocyte gate. (Bright CD45 positive; low side scatter, low forward scatter).  14% of cells.  CD45 .......... 100 %  CD3 ........... 80 %  CD5 ........... 79 %  CD7 ........... 68 %  CD4 ........... 62 %  CD8 ........... 20 %  CD10 .......... 1 %  CD19 .......... 1 %  CD20 .......... 1 %  kappa ......... 1 %  lambda ........ 1 %    RADIOLOGY & ADDITIONAL TESTS:  Imaging Personally Reviewed:  [ x] YES   < from: MR Thoracic Spine w/wo IV Cont (04.19.19 @ 21:24) >  IMPRESSION:  In comparison to 4/14/2019, increased size, enhancement and hyperintense T2 and FLAIR signal likely edema within the left posterior frontal and parietal periventricular white matter lesion containing central rounded foci of hyperintense T2 signal with heterogeneity possibly necrosis, differential includes neoplasm, additional etiologies including atypical aggressive demyelinating plaques with Balo's concentric sclerosis, also not completely excluded correlate with CSF serologies. Redemonstration of unchanged foci of hyperintense T2 signal within the right periventricular white matter. Cervical and thoracic spinal MRI are unremarkable.   < end of copied text >    < from: CT Abdomen and Pelvis w/ IV Cont (04.15.19 @ 17:10) >  IMPRESSION:   Nonspecificsubcentimeter retroperitoneal and supraclavicular lymph nodes. No lymphadenopathy in the chest, abdomen, or pelvis. Left adnexal dermoid cyst.  < end of copied text >    MEDICATIONS  (STANDING):  gabapentin 100 milliGRAM(s) Oral three times a day  magnesium sulfate  IVPB 1 Gram(s) IV Intermittent once  melatonin 3 milliGRAM(s) Oral at bedtime  ondansetron Injectable 4 milliGRAM(s) IV Push every 8 hours  sodium chloride 0.9%. 1000 milliLiter(s) (95 mL/Hr) IV Continuous <Continuous>    MEDICATIONS  (PRN):  acetaminophen   Tablet .. 650 milliGRAM(s) Oral every 6 hours PRN Temp greater or equal to 38C (100.4F), Mild Pain (1 - 3)  LORazepam     Tablet 0.5 milliGRAM(s) Oral three times a day PRN Anxiety

## 2019-04-21 NOTE — CONSULT NOTE ADULT - ATTENDING COMMENTS
Consult cancelled by neurology  Please call us back if needed  Regards  Jay Antunez MD  Division of Surgical Oncology  [address] [place] [id number]  Ph:[phone]
Examined the patient and agreed with above. DDx is that of lymphoma Vs MS Vs other neoplasia.  Sx-biopsy on Thursday.

## 2019-04-21 NOTE — CHART NOTE - NSCHARTNOTEFT_GEN_A_CORE
NEURO-ONCOLOGY: KADEN     patient seen and examined  met with parents at bedside  NEURO note reviewed  MR imaging studies reviewed.    Briefly, 29 RH woman without significant PMHx, now with multiple enhancing brain lesions of uncertain etiology.  presented with visual field cut  s/p LP demonstrating no diagnostic abnormalities  d/c'd home with plan to repeat MR in one month's time, strong suspicion for tumefactive Multiple Sclerosis as CT-c/a/p unremarkable.  returned to ED with progressive headaches, mainly positional  MR demonstrated worsening of enhancing lesions involving left johanny-occipital horn region.    EXAM  AO x3  non aphasic, fluent speech, follows all commands.  no drift  no EOM deficits  no dysmetria  subtle slowing of rapid alternating movements on the right.  arms and legs are strong, without weakness of thumb-little finger opposition.  gait is normal    MRI   4/19/19 COMPARED WITH 4/14/19 demonstrates increased region of enhancement, perhaps with more enhancement, although centrally less enhancing than before, suggesting evolution of process, rather than progression. Lesions now look more like MS, with incomplete rings.    IMPRESSION/PLAN  28 yo RH woman with enhancing brain lesions and no other disease, concerning for PCNSL (HIV is negative) or perhaps multiple sclerosis (more likely).    ETIOLOGY -- as patient's disease has perhaps worsened on imaging, no objection to brain biopsy. WOULD NOT ATTEMPT to resect the lesion, as MS and Lymphoma are typically mixed up with normal brain and when treated, resolve completely, often without residual deficits.  DISPO -- happy to see as outpatient following biopsy. I note biopsy results likely to take 7-10 days, so patient may return home and follow-up with me when results are ready.    total time 30 minutes

## 2019-04-21 NOTE — CONSULT NOTE ADULT - SUBJECTIVE AND OBJECTIVE BOX
p ([id number])     HPI: 29 yr old F h/o anxiety, ITP, childhood migraine presented a week ago with worsening HA, nausea for 2 days. Her HA became unbearable with few episodes of nausea, emesis, photophobia and HA which worsens with sitting or standing upright improves with laying flat. LP was unremarkable. Patient denies new weakness/numbness, saddle anesthesia, and urinary and bowel incontinence.       PAST MEDICAL HISTORY   Thrombocytopenia  Anxiety    PAST SURGICAL HISTORY   History of cholecystectomy    sulfa drugs (Hives)      MEDICATIONS:  Antibiotics:    Neuro:  acetaminophen   Tablet .. 650 milliGRAM(s) Oral every 6 hours PRN  gabapentin 100 milliGRAM(s) Oral three times a day  LORazepam     Tablet 0.5 milliGRAM(s) Oral three times a day PRN  melatonin 3 milliGRAM(s) Oral at bedtime  ondansetron Injectable 4 milliGRAM(s) IV Push every 8 hours    Anticoagulation:    Other:  magnesium sulfate  IVPB 1 Gram(s) IV Intermittent once  sodium chloride 0.9%. 1000 milliLiter(s) IV Continuous <Continuous>      SOCIAL HISTORY:   Occupation:   Marital Status:     FAMILY HISTORY:  Family history of heart disease (Grandparent)      PHYSICAL EXAMINATION:   T(C): 36.8 (04-21-19 @ 08:09), Max: 37.3 (04-20-19 @ 20:23)  HR: 75 (04-21-19 @ 08:09) (69 - 94)  BP: 99/66 (04-21-19 @ 08:09) (99/66 - 131/84)  RR: 18 (04-21-19 @ 08:09) (17 - 18)  SpO2: 99% (04-21-19 @ 08:09) (96% - 99%)  Wt(kg): --    General Examination:     Neurologic Examination:           AOX3, PERRL, FC, no facial, tongue midline  5/5 in all extremities  + goldstein's BL  Clonus in RLE    LABS:                RADIOLOGY & ADDITIONAL STUDIES:    MRI: In comparison to 4/14/2019, increased size, enhancement and hyperintense T2 and FLAIR signal likely edema within the left posterior frontal and parietal periventricular white matter lesion containing central rounded foci of hyperintense T2 signal with heterogeneity possibly necrosis, differential includes neoplasm, additional etiologies including atypical aggressive demyelinating plaques with Balo's concentric sclerosis, also not completely excluded correlate with CSF serologies. Redemonstration of unchanged foci of hyperintense T2 signal within the right   periventricular white matter.     CT C/A/P: Nonspecific subcentimeter retroperitoneal and supraclavicular lymph nodes.   No lymphadenopathy in the chest, abdomen, or pelvis. p ([id number])     HPI: 29 yr old F h/o anxiety, ITP, childhood migraine presented a week ago with worsening HA, nausea for 2 days. Her HA became unbearable with few episodes of nausea, emesis, photophobia and HA which worsens with sitting or standing upright improves with laying flat. LP was unremarkable. Patient denies new weakness/numbness, saddle anesthesia, and urinary and bowel incontinence.       PAST MEDICAL HISTORY   Thrombocytopenia  Anxiety    PAST SURGICAL HISTORY   History of cholecystectomy    sulfa drugs (Hives)      MEDICATIONS:  Antibiotics:    Neuro:  acetaminophen   Tablet .. 650 milliGRAM(s) Oral every 6 hours PRN  gabapentin 100 milliGRAM(s) Oral three times a day  LORazepam     Tablet 0.5 milliGRAM(s) Oral three times a day PRN  melatonin 3 milliGRAM(s) Oral at bedtime  ondansetron Injectable 4 milliGRAM(s) IV Push every 8 hours    Anticoagulation:    Other:  magnesium sulfate  IVPB 1 Gram(s) IV Intermittent once  sodium chloride 0.9%. 1000 milliLiter(s) IV Continuous <Continuous>      SOCIAL HISTORY:   Occupation:   Marital Status:     FAMILY HISTORY:  Family history of heart disease (Grandparent)      PHYSICAL EXAMINATION:   T(C): 36.8 (04-21-19 @ 08:09), Max: 37.3 (04-20-19 @ 20:23)  HR: 75 (04-21-19 @ 08:09) (69 - 94)  BP: 99/66 (04-21-19 @ 08:09) (99/66 - 131/84)  RR: 18 (04-21-19 @ 08:09) (17 - 18)  SpO2: 99% (04-21-19 @ 08:09) (96% - 99%)  Wt(kg): --    General Examination:     Neurologic Examination:           AOx3, FC, PERRL, EOMI, V1-3 intact, no facial, tongue midline, shrug 5/5  5/5 throughout, no drift  SILT      LABS:                RADIOLOGY & ADDITIONAL STUDIES:    MRI: In comparison to 4/14/2019, increased size, enhancement and hyperintense T2 and FLAIR signal likely edema within the left posterior frontal and parietal periventricular white matter lesion containing central rounded foci of hyperintense T2 signal with heterogeneity possibly necrosis, differential includes neoplasm, additional etiologies including atypical aggressive demyelinating plaques with Balo's concentric sclerosis, also not completely excluded correlate with CSF serologies. Redemonstration of unchanged foci of hyperintense T2 signal within the right   periventricular white matter.     CT C/A/P: Nonspecific subcentimeter retroperitoneal and supraclavicular lymph nodes.   No lymphadenopathy in the chest, abdomen, or pelvis.

## 2019-04-21 NOTE — PROGRESS NOTE ADULT - SUBJECTIVE AND OBJECTIVE BOX
Neurology Follow up note    Subjective: No acute events overnight.    Objective:   Vital Signs Last 24 Hrs  T(C): 36.3 (21 Apr 2019 04:58), Max: 37.3 (20 Apr 2019 20:23)  T(F): 97.3 (21 Apr 2019 04:58), Max: 99.2 (20 Apr 2019 20:23)  HR: 69 (21 Apr 2019 04:58) (65 - 94)  BP: 109/62 (21 Apr 2019 04:58) (104/69 - 131/84)  BP(mean): --  RR: 18 (21 Apr 2019 04:58) (17 - 18)  SpO2: 98% (21 Apr 2019 04:58) (96% - 99%)      General: lying in bed comfortably, NAD.    Neurological Exam:    Mental Status: Orientated to self, date and place.  Attention intact.   PERRL, EOMI, v1-3 intact, no facial assymetry, tongue midline   5/5 in all four extremitits   Snehal positive RUE, LUE 3+  Babanski relative positive comapred to left ( left downgoging, right mute)   Gait : deferred        MEDICATIONS  (STANDING):  gabapentin 100 milliGRAM(s) Oral three times a day  magnesium sulfate  IVPB 1 Gram(s) IV Intermittent once  melatonin 3 milliGRAM(s) Oral at bedtime  ondansetron Injectable 4 milliGRAM(s) IV Push every 8 hours  sodium chloride 0.9%. 1000 milliLiter(s) (95 mL/Hr) IV Continuous <Continuous>    MEDICATIONS  (PRN):  acetaminophen   Tablet .. 650 milliGRAM(s) Oral every 6 hours PRN Temp greater or equal to 38C (100.4F), Mild Pain (1 - 3)  LORazepam     Tablet 0.5 milliGRAM(s) Oral three times a day PRN Anxiety    Radiology:  < from: CT Head No Cont (04.18.19 @ 19:57) >  IMPRESSION: Left frontoparietal periventricular white matter lucency with   mass impression on the left lateral ventricle suspicious for a large   demyelinating lesion when compared with the patient's prior MR 4/14/2019.   No other lesions are seen    < from: MR Head w/wo IV Cont (04.19.19 @ 21:17) >  In comparison to 4/14/2019, increased size, enhancement and hyperintense   T2 and FLAIR signal likely edema within the left posterior frontal and   parietal periventricular white matter lesion containing central rounded   foci of hyperintense T2 signal with heterogeneity possibly necrosis,   differential includes neoplasm, additional etiologies including atypical   aggressive demyelinating plaques with Balo's concentric sclerosis, also   not completely excluded correlate with CSF serologies.    Redemonstration of unchanged foci of hyperintense T2 signal within the   right periventricular white matter.    Cervical and thoracic spinal MRI are unremarkable. Findings discussed   with Dr. Fleming at immediate time of review on 4/20/2019 at 7:55 AM. Neurology Follow up note    Subjective: No acute events overnight.    Objective:   Vital Signs Last 24 Hrs  T(C): 36.3 (21 Apr 2019 04:58), Max: 37.3 (20 Apr 2019 20:23)  T(F): 97.3 (21 Apr 2019 04:58), Max: 99.2 (20 Apr 2019 20:23)  HR: 69 (21 Apr 2019 04:58) (65 - 94)  BP: 109/62 (21 Apr 2019 04:58) (104/69 - 131/84)  BP(mean): --  RR: 18 (21 Apr 2019 04:58) (17 - 18)  SpO2: 98% (21 Apr 2019 04:58) (96% - 99%)      General: lying in bed comfortably, NAD.    Neurological Exam:    Mental Status: Orientated to self, date and place.  Attention intact.   PERRL, EOMI, v1-3 intact, no facial assymetry, tongue midline   5/5 in all four extremitits   Snehal positive RUE, LUE 3+  Babanski relative positive comapred to left ( left downgoging, right mute)   Sustained clonus on the right  Gait : deferred        MEDICATIONS  (STANDING):  gabapentin 100 milliGRAM(s) Oral three times a day  magnesium sulfate  IVPB 1 Gram(s) IV Intermittent once  melatonin 3 milliGRAM(s) Oral at bedtime  ondansetron Injectable 4 milliGRAM(s) IV Push every 8 hours  sodium chloride 0.9%. 1000 milliLiter(s) (95 mL/Hr) IV Continuous <Continuous>    MEDICATIONS  (PRN):  acetaminophen   Tablet .. 650 milliGRAM(s) Oral every 6 hours PRN Temp greater or equal to 38C (100.4F), Mild Pain (1 - 3)  LORazepam     Tablet 0.5 milliGRAM(s) Oral three times a day PRN Anxiety    Radiology:  < from: CT Head No Cont (04.18.19 @ 19:57) >  IMPRESSION: Left frontoparietal periventricular white matter lucency with   mass impression on the left lateral ventricle suspicious for a large   demyelinating lesion when compared with the patient's prior MR 4/14/2019.   No other lesions are seen    < from: MR Head w/wo IV Cont (04.19.19 @ 21:17) >  In comparison to 4/14/2019, increased size, enhancement and hyperintense   T2 and FLAIR signal likely edema within the left posterior frontal and   parietal periventricular white matter lesion containing central rounded   foci of hyperintense T2 signal with heterogeneity possibly necrosis,   differential includes neoplasm, additional etiologies including atypical   aggressive demyelinating plaques with Balo's concentric sclerosis, also   not completely excluded correlate with CSF serologies.    Redemonstration of unchanged foci of hyperintense T2 signal within the   right periventricular white matter.    Cervical and thoracic spinal MRI are unremarkable. Findings discussed   with Dr. Fleming at immediate time of review on 4/20/2019 at 7:55 AM.

## 2019-04-21 NOTE — PROGRESS NOTE ADULT - ASSESSMENT
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 CT head showed worsening edema compared to prior MRI brain. Exam significant for Brooks's sign in RUE and relative babinski on right. MRI brain showed increase size of enhancement compared to prior MRI brain. MRI cervical spine and T spine are unremarkable.    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    Plan:  [] rEEG  [x] Surgery consulted for excisional biopsy of left supraclavicular lymph node, too small suggest doing under IR and/or after PET  [] Considering brain bx instead at this point, will contact Dr. Garcia (neuro-onc) and Dr. Erickson (neuro-surg)  [x] Symptomatic treatment for headache, conservative management suggested by anesthesia  [x] Psych consulted for anxiety  [x] prn anxiolytics Xanax 0.5 mg TID prn Pt is a 28 yo woman with a PMH of ITP (in childhood) admitted for c/o headache s/p LP on prior recent admission where she was diagnosed with brain mass (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 CT head showed worsening edema compared to prior MRI brain. Exam significant for Brooks's sign in RUE and relative babinski on right. MRI brain showed increase size of enhancement compared to prior MRI brain. MRI cervical spine and T spine are unremarkable.    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    Plan:  [] rEEG  [x] Surgery consulted for excisional biopsy of left supraclavicular lymph node, too small suggest doing under IR and/or after PET  [] Neurosurgery for brain bx instead at this point  [x] Dr. Garcia (neuro-onc) on board  [x] Symptomatic treatment for headache, conservative management suggested by anesthesia  [x] Psych consulted for anxiety  [x] prn anxiolytics Xanax 0.5 mg TID prn Pt is a 28 yo woman with a PMH of ITP (in childhood) admitted for c/o headache s/p LP on prior recent admission where she was diagnosed with brain mass (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 CT head showed worsening edema compared to prior MRI brain. Exam significant for Brooks's sign in RUE and relative babinski on right. MRI brain showed increase size of enhancement compared to prior MRI brain. MRI cervical spine and T spine are unremarkable.    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    Plan:  [] rEEG  [x] Surgery consulted for excisional biopsy of left supraclavicular lymph node, too small suggest doing under IR and/or after PET  [] Neurosurgery for brain bx instead at this point  [x] Dr. Garcia (neuro-onc) on board  [x] Symptomatic treatment for headache, conservative management suggested by anesthesia, now resolved  [x] Psych consulted for anxiety  [x] prn anxiolytics Xanax 0.5 mg TID prn Pt is a 30 yo woman with a PMH of ITP (in childhood) admitted for c/o headache s/p LP on prior recent admission where she was diagnosed with brain mass (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 CT head showed worsening edema compared to prior MRI brain. Exam significant for Brooks's sign in RUE and relative babinski on right. MRI brain showed increase size of enhancement compared to prior MRI brain. MRI cervical spine and T spine are unremarkable.    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    Plan:  [] rEEG  [x] Surgery consulted for excisional biopsy of left supraclavicular lymph node, too small suggest doing under IR and/or after PET  [] Neurosurgery for brain bx instead at this point  [] Medicine for clearance  [x] Dr. Garcia (neuro-onc) on board  [x] Symptomatic treatment for headache, conservative management suggested by anesthesia, now resolved  [x] Psych consulted for anxiety  [x] prn anxiolytics Xanax 0.5 mg TID prn Pt is a 28 yo woman with a PMH of ITP (in childhood) admitted for c/o headache s/p LP on prior recent admission where she was diagnosed with brain mass (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 CT head showed worsening edema compared to prior MRI brain. Exam significant for Brooks's sign in RUE and relative babinski on right. MRI brain showed increase size of enhancement compared to prior MRI brain. MRI cervical spine and T spine are unremarkable.    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    Plan:  [] rEEG  [x] Surgery consulted for excisional biopsy of left supraclavicular lymph node, too small suggest doing under IR and/or after PET  [] Neurosurgery for brain bx instead at this point (pre-op labs ordered)  [] Medicine for clearance  [x] Dr. Garcia (neuro-onc) on board  [x] Symptomatic treatment for headache, conservative management suggested by anesthesia, now resolved  [x] Psych consulted for anxiety  [x] prn anxiolytics Xanax 0.5 mg TID prn Pt is a 28 yo woman with a PMH of ITP (in childhood) admitted for c/o headache s/p LP on prior recent admission where she was diagnosed with brain mass (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 CT head showed worsening edema compared to prior MRI brain. Exam significant for Brooks's sign in RUE and relative babinski on right. MRI brain showed increase size of enhancement compared to prior MRI brain. MRI cervical spine and T spine are unremarkable.    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    Plan:  [] rEEG  [x] Surgery consulted for excisional biopsy of left supraclavicular lymph node, too small suggest doing under IR and/or after PET  [] Neurosurgery for brain bx instead at this point (pre-op labs ordered for AM)  [] Medicine for clearance  [x] Holding off on steroids until post-op  [x] Dr. Garcia (neuro-onc) on board  [x] Symptomatic treatment for headache, conservative management suggested by anesthesia, now resolved  [x] Psych consulted for anxiety  [x] prn anxiolytics Xanax 0.5 mg TID prn

## 2019-04-21 NOTE — CONSULT NOTE ADULT - ASSESSMENT
29 yr old F h/o anxiety, ITP, childhood migraine presented a week ago with worsening HA, nausea for 2 days. Her HA became unbearable with few episodes of nausea, emesis, photophobia and HA which worsens with sitting or standing upright improves with laying flat. LP was unremarkable. platelets 196. CT C/A/P showed nonspecific subcentimeter retroperitoneal and supraclavicular lymph nodes. MRI on 4/19 showed increased size, enhancement and hyperintense T2 and FLAIR signal within the left posterior frontal and parietal periventricular white matter lesion compared to MRI on 4/14.     Plan:  - Continue management per primary team  - Hold off giving steroids  - Plan for biopsy pending Dr. Munguia's availability   - Please obtain medical clearance and preop labs

## 2019-04-21 NOTE — CONSULT NOTE ADULT - ASSESSMENT
pending 30 yo F with a PMH anxiety, ITP, childhood migraine who presented with worsening HA, nausea for 2 days after being discharged on 4/16 during which she had an LP on 4/14. 28 yo F with a PMH anxiety, ITP, childhood migraine who presented with worsening HA, nausea for 2 days after being discharged on 4/16 during which she had an LP on 4/14, readmitted for possible brain biopsy of large intracranial lesion with NSGY. Medicine is consulted for medical optimization assessment.    #Preoperative Risk Assessment and Medical Optimization:  -Urgency: not emergent  -Surgery: neurosurgery, brain biopsy  -Surgery Risk Classification Intermediate-risk  -Cardiovascular Risk Assessment: by Revised Cardiac Risk Index, the patient is low risk for this intermediate-risk procedure. She is RCRI 0 points, Class I Risk, with 3.9% risk of death, MI, or cardiac arrest.   -Additional studies required: please obtain baseline EKG for review.   -Optimization: The patient has no cardiovascular risk factors with METS>4 (easily able to walk up flight of stairs without stopping). In the setting of ITP, her Platelet counts should be considered with goal >100k/microL. Her current platelet count is above this goal. Pending an unremarkable EKG, the patient is medically optimized for this urgent, intermediate-risk procedure.    Jae Caceres, PGY-3  -[id number] 29yoF with a PMH anxiety, ITP, childhood migraine who presented with worsening HA, nausea for 2 days after being discharged on 4/16 during which she had an LP on 4/14, readmitted for possible brain biopsy of large intracranial lesion with NSGY. Medicine is consulted for medical optimization assessment.    #Preoperative Risk Assessment and Medical Optimization:  -Urgency: not emergent  -Surgery: neurosurgery, brain biopsy  -Surgery Risk Classification Intermediate-risk  -Cardiovascular Risk Assessment: by Revised Cardiac Risk Index, the patient is low risk for this intermediate-risk procedure. She is RCRI 0 points, Class I Risk, with 3.9% risk of death, MI, or cardiac arrest.   -Additional studies required: please obtain baseline EKG for review.   -Optimization: The patient has no cardiovascular risk factors with METS>4 (easily able to walk up flight of stairs without stopping). In the setting of ITP, her Platelet counts should be considered with goal >100k/microL. Her current platelet count is above this goal. Pending an unremarkable EKG, the patient is medically optimized for this urgent, intermediate-risk procedure.    Jae Caceres, PGY-3  -[phone]

## 2019-04-22 LAB
ALBUMIN SERPL ELPH-MCNC: 4.4 G/DL — SIGNIFICANT CHANGE UP (ref 3.3–5)
ALP SERPL-CCNC: 49 U/L — SIGNIFICANT CHANGE UP (ref 40–120)
ALT FLD-CCNC: 14 U/L — SIGNIFICANT CHANGE UP (ref 10–45)
ANION GAP SERPL CALC-SCNC: 10 MMOL/L — SIGNIFICANT CHANGE UP (ref 5–17)
APTT BLD: 28 SEC — SIGNIFICANT CHANGE UP (ref 27.5–36.3)
AST SERPL-CCNC: 13 U/L — SIGNIFICANT CHANGE UP (ref 10–40)
BILIRUB SERPL-MCNC: 0.2 MG/DL — SIGNIFICANT CHANGE UP (ref 0.2–1.2)
BLD GP AB SCN SERPL QL: NEGATIVE — SIGNIFICANT CHANGE UP
BUN SERPL-MCNC: 5 MG/DL — LOW (ref 7–23)
CALCIUM SERPL-MCNC: 9.2 MG/DL — SIGNIFICANT CHANGE UP (ref 8.4–10.5)
CHLORIDE SERPL-SCNC: 105 MMOL/L — SIGNIFICANT CHANGE UP (ref 96–108)
CO2 SERPL-SCNC: 25 MMOL/L — SIGNIFICANT CHANGE UP (ref 22–31)
CREAT SERPL-MCNC: 0.79 MG/DL — SIGNIFICANT CHANGE UP (ref 0.5–1.3)
GLUCOSE SERPL-MCNC: 91 MG/DL — SIGNIFICANT CHANGE UP (ref 70–99)
HCT VFR BLD CALC: 38.7 % — SIGNIFICANT CHANGE UP (ref 34.5–45)
HGB BLD-MCNC: 12.9 G/DL — SIGNIFICANT CHANGE UP (ref 11.5–15.5)
INR BLD: 1.01 RATIO — SIGNIFICANT CHANGE UP (ref 0.88–1.16)
MCHC RBC-ENTMCNC: 28.6 PG — SIGNIFICANT CHANGE UP (ref 27–34)
MCHC RBC-ENTMCNC: 33.3 GM/DL — SIGNIFICANT CHANGE UP (ref 32–36)
MCV RBC AUTO: 85.8 FL — SIGNIFICANT CHANGE UP (ref 80–100)
PLATELET # BLD AUTO: 235 K/UL — SIGNIFICANT CHANGE UP (ref 150–400)
POTASSIUM SERPL-MCNC: 4.5 MMOL/L — SIGNIFICANT CHANGE UP (ref 3.5–5.3)
POTASSIUM SERPL-SCNC: 4.5 MMOL/L — SIGNIFICANT CHANGE UP (ref 3.5–5.3)
PROT SERPL-MCNC: 6.8 G/DL — SIGNIFICANT CHANGE UP (ref 6–8.3)
PROTHROM AB SERPL-ACNC: 11.4 SEC — SIGNIFICANT CHANGE UP (ref 10–13.1)
RBC # BLD: 4.51 M/UL — SIGNIFICANT CHANGE UP (ref 3.8–5.2)
RBC # FLD: 13 % — SIGNIFICANT CHANGE UP (ref 10.3–14.5)
RH IG SCN BLD-IMP: NEGATIVE — SIGNIFICANT CHANGE UP
SODIUM SERPL-SCNC: 140 MMOL/L — SIGNIFICANT CHANGE UP (ref 135–145)
WBC # BLD: 8.02 K/UL — SIGNIFICANT CHANGE UP (ref 3.8–10.5)
WBC # FLD AUTO: 8.02 K/UL — SIGNIFICANT CHANGE UP (ref 3.8–10.5)

## 2019-04-22 PROCEDURE — 99232 SBSQ HOSP IP/OBS MODERATE 35: CPT

## 2019-04-22 PROCEDURE — 99255 IP/OBS CONSLTJ NEW/EST HI 80: CPT | Mod: 57

## 2019-04-22 RX ADMIN — Medication 3 MILLIGRAM(S): at 23:36

## 2019-04-22 RX ADMIN — ONDANSETRON 4 MILLIGRAM(S): 8 TABLET, FILM COATED ORAL at 06:02

## 2019-04-22 RX ADMIN — GABAPENTIN 100 MILLIGRAM(S): 400 CAPSULE ORAL at 23:36

## 2019-04-22 RX ADMIN — ONDANSETRON 4 MILLIGRAM(S): 8 TABLET, FILM COATED ORAL at 23:37

## 2019-04-22 RX ADMIN — ONDANSETRON 4 MILLIGRAM(S): 8 TABLET, FILM COATED ORAL at 14:43

## 2019-04-22 RX ADMIN — GABAPENTIN 100 MILLIGRAM(S): 400 CAPSULE ORAL at 06:04

## 2019-04-22 RX ADMIN — Medication 0.5 MILLIGRAM(S): at 23:36

## 2019-04-22 RX ADMIN — GABAPENTIN 100 MILLIGRAM(S): 400 CAPSULE ORAL at 14:44

## 2019-04-22 NOTE — PROGRESS NOTE ADULT - SUBJECTIVE AND OBJECTIVE BOX
Neurology Follow up note    Patient is a 29y old  Female who presents with a chief complaint of severe HA (21 Apr 2019 13:30)      Subjective:Interval History - No events overnight    Objective:   Vital Signs Last 24 Hrs  T(C): 36.9 (22 Apr 2019 07:08), Max: 37.1 (22 Apr 2019 00:24)  T(F): 98.4 (22 Apr 2019 07:08), Max: 98.7 (22 Apr 2019 00:24)  HR: 69 (22 Apr 2019 07:08) (69 - 96)  BP: 113/77 (22 Apr 2019 07:08) (109/75 - 137/93)  BP(mean): --  RR: 18 (22 Apr 2019 07:08) (17 - 18)  SpO2: 98% (22 Apr 2019 07:08) (96% - 99%)    General Exam:   General appearance: No acute distress                 Cardiovascular: Pedal dorsalis pulses intact bilaterally    Neurological Exam:  Mental Status: Orientated to self, date and place.  Attention intact.  No dysarthria, aphasia or neglect.  Knowledge intact.  Registration intact.  Short and long term memory grossly intact.      Cranial Nerves:  PERRL, EOMI, VFF, no nystagmus or diplopia.  No APD.    CN V1-3 intact to light touch and pinprick.  No facial asymmetry.  Hearing intact to finger rub bilaterally.  Tongue, uvula and palate midline.  Sternocleidomastoid and Trapezius intact bilaterally.    Motor:   Tone: normal.                  Strength: intact throughout  Pronator drift: none                 Dysmeria: None to finger-nose-finger or heel-shin-heel  No truncal ataxia.    Tremor: No resting, postural or action tremor.  No myoclonus.    Sensation: intact to light touch, pinprick, vibration and proprioception    Deep Tendon Reflexes: 1+ bilateral biceps, triceps, brachioradialis, knee and ankle  Toes flexor bilaterally    Gait: normal and stable.      Other:    04-22    140  |  105  |  5<L>  ----------------------------<  91  4.5   |  25  |  0.79    Ca    9.2      22 Apr 2019 06:42    TPro  6.8  /  Alb  4.4  /  TBili  0.2  /  DBili  x   /  AST  13  /  ALT  14  /  AlkPhos  49  04-22 04-22    140  |  105  |  5<L>  ----------------------------<  91  4.5   |  25  |  0.79    Ca    9.2      22 Apr 2019 06:42    TPro  6.8  /  Alb  4.4  /  TBili  0.2  /  DBili  x   /  AST  13  /  ALT  14  /  AlkPhos  49  04-22    LIVER FUNCTIONS - ( 22 Apr 2019 06:42 )  Alb: 4.4 g/dL / Pro: 6.8 g/dL / ALK PHOS: 49 U/L / ALT: 14 U/L / AST: 13 U/L / GGT: x                                 12.9   8.02  )-----------( 235      ( 22 Apr 2019 08:09 )             38.7     Radiology    EKG:  tele:  TTE:  EEG:      MEDICATIONS  (STANDING):  gabapentin 100 milliGRAM(s) Oral three times a day  magnesium sulfate  IVPB 1 Gram(s) IV Intermittent once  melatonin 3 milliGRAM(s) Oral at bedtime  ondansetron Injectable 4 milliGRAM(s) IV Push every 8 hours  sodium chloride 0.9%. 1000 milliLiter(s) (95 mL/Hr) IV Continuous <Continuous>    MEDICATIONS  (PRN):  acetaminophen   Tablet .. 650 milliGRAM(s) Oral every 6 hours PRN Temp greater or equal to 38C (100.4F), Mild Pain (1 - 3)  LORazepam     Tablet 0.5 milliGRAM(s) Oral three times a day PRN Anxiety Neurology Follow up note    Patient is a 29y old  Female who presents with a chief complaint of severe HA       Subjective:Interval History - No events overnight    Objective:   Vital Signs Last 24 Hrs  T(C): 36.9 (22 Apr 2019 07:08), Max: 37.1 (22 Apr 2019 00:24)  T(F): 98.4 (22 Apr 2019 07:08), Max: 98.7 (22 Apr 2019 00:24)  HR: 69 (22 Apr 2019 07:08) (69 - 96)  BP: 113/77 (22 Apr 2019 07:08) (109/75 - 137/93)  BP(mean): --  RR: 18 (22 Apr 2019 07:08) (17 - 18)  SpO2: 98% (22 Apr 2019 07:08) (96% - 99%)    General Exam:   General appearance: No acute distress                   Mental Status: Orientated to self, date and place.  Attention intact.   PERRL, EOMI, v1-3 intact, no facial assymetry, tongue midline   5/5 in all four extremitits   Snehal positive RUE, LUE 3+  Babanski relative positive comapred to left ( left downgoging, right mute)   Sustained clonus on the right  Gait : deferred     Other:    04-22    140  |  105  |  5<L>  ----------------------------<  91  4.5   |  25  |  0.79    Ca    9.2      22 Apr 2019 06:42    TPro  6.8  /  Alb  4.4  /  TBili  0.2  /  DBili  x   /  AST  13  /  ALT  14  /  AlkPhos  49  04-22 04-22    140  |  105  |  5<L>  ----------------------------<  91  4.5   |  25  |  0.79    Ca    9.2      22 Apr 2019 06:42    TPro  6.8  /  Alb  4.4  /  TBili  0.2  /  DBili  x   /  AST  13  /  ALT  14  /  AlkPhos  49  04-22    LIVER FUNCTIONS - ( 22 Apr 2019 06:42 )  Alb: 4.4 g/dL / Pro: 6.8 g/dL / ALK PHOS: 49 U/L / ALT: 14 U/L / AST: 13 U/L / GGT: x                                 12.9   8.02  )-----------( 235      ( 22 Apr 2019 08:09 )             38.7     Radiology    < from: MR Head w/wo IV Cont (04.19.19 @ 21:17) >    In comparison to 4/14/2019, increased size, enhancement and hyperintense   T2 and FLAIR signal likely edema within the left posterior frontal and   parietal periventricular white matter lesion containing central rounded   foci of hyperintense T2 signal with heterogeneity possibly necrosis,   differential includes neoplasm, additional etiologies including atypical   aggressive demyelinating plaques with Balo's concentric sclerosis, also   not completely excluded correlate with CSF serologies.    Redemonstration of unchanged foci of hyperintense T2 signal within the   right periventricular white matter.    Cervical and thoracic spinal MRI are unremarkable. Findings discussed   with Dr. Fleming at immediate time of review on 4/20/2019 at 7:55 AM.    < end of copied text >      MEDICATIONS  (STANDING):  gabapentin 100 milliGRAM(s) Oral three times a day  magnesium sulfate  IVPB 1 Gram(s) IV Intermittent once  melatonin 3 milliGRAM(s) Oral at bedtime  ondansetron Injectable 4 milliGRAM(s) IV Push every 8 hours  sodium chloride 0.9%. 1000 milliLiter(s) (95 mL/Hr) IV Continuous <Continuous>    MEDICATIONS  (PRN):  acetaminophen   Tablet .. 650 milliGRAM(s) Oral every 6 hours PRN Temp greater or equal to 38C (100.4F), Mild Pain (1 - 3)  LORazepam     Tablet 0.5 milliGRAM(s) Oral three times a day PRN Anxiety

## 2019-04-22 NOTE — PROGRESS NOTE ADULT - ASSESSMENT
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 CT head showed worsening edema compared to prior MRI brain. Exam significant for Brooks's sign in RUE and relative babinski on right. MRI brain showed increase size of enhancement compared to prior MRI brain. MRI cervical spine and T spine are unremarkable.    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    Plan:  [x] Surgery consulted for excisional biopsy of left supraclavicular lymph node, too small suggest doing under IR and/or after PET  [] Neurosurgery for brain bx instead at this point (pre-op labs ordered for AM) -   [] Medicine for clearance  [x] Holding off on steroids until post-op  [x] Dr. 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 CT head showed worsening edema compared to prior MRI brain. Exam significant for Brooks's sign in RUE and relative babinski on right. MRI brain showed increase size of enhancement compared to prior MRI brain. MRI cervical spine and T spine are unremarkable.    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    Plan:  [x] Surgery consulted for excisional biopsy of left supraclavicular lymph node, too small suggest doing under IR and/or after PET  [] Neurosurgery for brain bx instead at this point (pre-op labs ordered for AM) -   [x] Medicine for clearance  [x] Holding off on steroids until post-op  [x] Dr. Garcia (neuro-onc) on board   [x] Symptomatic treatment for headache, conservative management suggested by anesthesia, now resolved  [x] Psych consulted for anxiety  [x] prn anxiolytics Xanax 0.5 mg TID prn

## 2019-04-23 PROCEDURE — 99231 SBSQ HOSP IP/OBS SF/LOW 25: CPT

## 2019-04-23 RX ADMIN — ONDANSETRON 4 MILLIGRAM(S): 8 TABLET, FILM COATED ORAL at 23:48

## 2019-04-23 RX ADMIN — Medication 0.5 MILLIGRAM(S): at 23:47

## 2019-04-23 RX ADMIN — GABAPENTIN 100 MILLIGRAM(S): 400 CAPSULE ORAL at 23:48

## 2019-04-23 RX ADMIN — ONDANSETRON 4 MILLIGRAM(S): 8 TABLET, FILM COATED ORAL at 07:08

## 2019-04-23 RX ADMIN — GABAPENTIN 100 MILLIGRAM(S): 400 CAPSULE ORAL at 07:08

## 2019-04-23 RX ADMIN — Medication 3 MILLIGRAM(S): at 23:48

## 2019-04-23 RX ADMIN — ONDANSETRON 4 MILLIGRAM(S): 8 TABLET, FILM COATED ORAL at 14:07

## 2019-04-23 RX ADMIN — GABAPENTIN 100 MILLIGRAM(S): 400 CAPSULE ORAL at 14:07

## 2019-04-23 NOTE — PROGRESS NOTE ADULT - SUBJECTIVE AND OBJECTIVE BOX
Patient seen and examined at bedside.    T(C): 36.9 (04-23-19 @ 00:00), Max: 37.6 (04-22-19 @ 20:07)  HR: 70 (04-23-19 @ 00:00) (69 - 85)  BP: 116/83 (04-23-19 @ 00:00) (109/75 - 126/83)  RR: 18 (04-23-19 @ 00:00) (18 - 18)  SpO2: 98% (04-23-19 @ 00:00) (97% - 99%)  Wt(kg): --    Exam:    AOx3, FC, PERRL, EOMI, V1-3 intact, no facial, tongue midline, shrug 5/5  5/5 throughout, no drift  SILT

## 2019-04-23 NOTE — PROGRESS NOTE ADULT - ASSESSMENT
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 CT head showed worsening edema compared to prior MRI brain. Exam significant for Brooks's sign in RUE and relative babinski on right. MRI brain showed increase size of enhancement compared to prior MRI brain. MRI cervical spine and T spine are unremarkable.    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    Plan:  [x] Surgery consulted for excisional biopsy of left supraclavicular lymph node, too small suggest doing under IR and/or after PET  [x] Neurosurgery for brain bx - scheduled on Thursday   [x] Medicine for clearance  [x] Holding off on steroids until post-op  [x] Dr. Garcia (neuro-onc) on board   [x] Symptomatic treatment for headache, conservative management suggested by anesthesia, now resolved  [x] Psych consulted for anxiety  [x] prn anxiolytics Xanax 0.5 mg TID prn

## 2019-04-23 NOTE — PROGRESS NOTE ADULT - ASSESSMENT
29 yr old F h/o anxiety, ITP, childhood migraine presented a week ago with worsening HA, nausea for 2 days. Her HA became unbearable with few episodes of nausea, emesis, photophobia and HA which worsens with sitting or standing upright improves with laying flat. LP was unremarkable. platelets 196. CT C/A/P showed nonspecific subcentimeter retroperitoneal and supraclavicular lymph nodes. MRI on 4/19 showed increased size, enhancement and hyperintense T2 and FLAIR signal within the left posterior frontal and parietal periventricular white matter lesion compared to MRI on 4/14.     Plan:  - Continue management per primary team  - Hold off giving steroids  - Plan for biopsy with Dr. Munguia's on Thursday

## 2019-04-24 ENCOUNTER — TRANSCRIPTION ENCOUNTER (OUTPATIENT)
Age: 29
End: 2019-04-24

## 2019-04-24 LAB
ANION GAP SERPL CALC-SCNC: 11 MMOL/L — SIGNIFICANT CHANGE UP (ref 5–17)
BLD GP AB SCN SERPL QL: NEGATIVE — SIGNIFICANT CHANGE UP
BUN SERPL-MCNC: 8 MG/DL — SIGNIFICANT CHANGE UP (ref 7–23)
CALCIUM SERPL-MCNC: 9.6 MG/DL — SIGNIFICANT CHANGE UP (ref 8.4–10.5)
CHLORIDE SERPL-SCNC: 103 MMOL/L — SIGNIFICANT CHANGE UP (ref 96–108)
CO2 SERPL-SCNC: 25 MMOL/L — SIGNIFICANT CHANGE UP (ref 22–31)
CREAT SERPL-MCNC: 0.78 MG/DL — SIGNIFICANT CHANGE UP (ref 0.5–1.3)
GLUCOSE SERPL-MCNC: 82 MG/DL — SIGNIFICANT CHANGE UP (ref 70–99)
HCG UR QL: NEGATIVE — SIGNIFICANT CHANGE UP
HCT VFR BLD CALC: 42.1 % — SIGNIFICANT CHANGE UP (ref 34.5–45)
HGB BLD-MCNC: 13.5 G/DL — SIGNIFICANT CHANGE UP (ref 11.5–15.5)
MCHC RBC-ENTMCNC: 28.2 PG — SIGNIFICANT CHANGE UP (ref 27–34)
MCHC RBC-ENTMCNC: 32.1 GM/DL — SIGNIFICANT CHANGE UP (ref 32–36)
MCV RBC AUTO: 87.9 FL — SIGNIFICANT CHANGE UP (ref 80–100)
PLATELET # BLD AUTO: 226 K/UL — SIGNIFICANT CHANGE UP (ref 150–400)
POTASSIUM SERPL-MCNC: 4.6 MMOL/L — SIGNIFICANT CHANGE UP (ref 3.5–5.3)
POTASSIUM SERPL-SCNC: 4.6 MMOL/L — SIGNIFICANT CHANGE UP (ref 3.5–5.3)
RBC # BLD: 4.79 M/UL — SIGNIFICANT CHANGE UP (ref 3.8–5.2)
RBC # FLD: 13.2 % — SIGNIFICANT CHANGE UP (ref 10.3–14.5)
RH IG SCN BLD-IMP: NEGATIVE — SIGNIFICANT CHANGE UP
SODIUM SERPL-SCNC: 139 MMOL/L — SIGNIFICANT CHANGE UP (ref 135–145)
WBC # BLD: 9.19 K/UL — SIGNIFICANT CHANGE UP (ref 3.8–10.5)
WBC # FLD AUTO: 9.19 K/UL — SIGNIFICANT CHANGE UP (ref 3.8–10.5)

## 2019-04-24 PROCEDURE — 99231 SBSQ HOSP IP/OBS SF/LOW 25: CPT

## 2019-04-24 RX ADMIN — Medication 0.5 MILLIGRAM(S): at 21:29

## 2019-04-24 RX ADMIN — GABAPENTIN 100 MILLIGRAM(S): 400 CAPSULE ORAL at 21:29

## 2019-04-24 RX ADMIN — ONDANSETRON 4 MILLIGRAM(S): 8 TABLET, FILM COATED ORAL at 21:29

## 2019-04-24 RX ADMIN — Medication 3 MILLIGRAM(S): at 21:29

## 2019-04-24 RX ADMIN — GABAPENTIN 100 MILLIGRAM(S): 400 CAPSULE ORAL at 14:36

## 2019-04-24 RX ADMIN — ONDANSETRON 4 MILLIGRAM(S): 8 TABLET, FILM COATED ORAL at 14:37

## 2019-04-24 RX ADMIN — GABAPENTIN 100 MILLIGRAM(S): 400 CAPSULE ORAL at 07:06

## 2019-04-24 NOTE — PROGRESS NOTE ADULT - ASSESSMENT
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 CT head showed worsening edema compared to prior MRI brain. Exam significant for Brooks's sign in RUE and relative babinski on right. MRI brain showed increase size of enhancement compared to prior MRI brain. MRI cervical spine and T spine are unremarkable.    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    Plan:    [x] Surgery consulted for excisional biopsy of left supraclavicular lymph node, too small suggest doing under IR and/or after PET  [x] Neurosurgery for brain bx - scheduled on Thursday   [x] Medicine for clearance  [x] Holding off on steroids until post-op  [x] Dr. 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 CT head showed worsening edema compared to prior MRI brain. Exam significant for Brooks's sign in RUE and relative babinski on right. MRI brain showed increase size of enhancement compared to prior MRI brain. MRI cervical spine and T spine are unremarkable.    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    Plan:    [x] Surgery consulted for excisional biopsy of left supraclavicular lymph node, too small suggest doing under IR and/or after PET  [x] Neurosurgery for brain bx - scheduled on Thursday   [x] Medicine for clearance  [x] Holding off on steroids since asymptomatic  [x] Dr. Garcia (neuro-onc) on board   [x] Symptomatic treatment for headache, conservative management suggested by anesthesia, now resolved  [x] prn anxiolytics Xanax 0.5 mg TID prn

## 2019-04-24 NOTE — DIETITIAN INITIAL EVALUATION ADULT. - NS AS NUTRI INTERV MEALS SNACK
Recommend continue regular diet as medically/surgically feasible. Encourage PO intake and obtain food preferences. Pt made aware RD remains available.

## 2019-04-24 NOTE — DIETITIAN INITIAL EVALUATION ADULT. - ENERGY NEEDS
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 Exam significant for Brooks's sign in right upper extremity and relative babinski on right. Pending biopsy tomorrow (04/25).   No noted edema as per flow sheets. Skin: no noted pressure injuries as per documentation.

## 2019-04-24 NOTE — PROGRESS NOTE ADULT - SUBJECTIVE AND OBJECTIVE BOX
Patient seen and examined at bedside.    T(C): 36.9 (04-23-19 @ 23:15), Max: 37.1 (04-23-19 @ 07:48)  HR: 79 (04-23-19 @ 23:15) (72 - 79)  BP: 112/73 (04-23-19 @ 23:15) (101/70 - 121/70)  RR: 16 (04-23-19 @ 23:15) (16 - 18)  SpO2: 96% (04-23-19 @ 23:15) (95% - 99%)  Wt(kg): --    Exam:    AOx3, FC, PERRL, EOMI, V1-3 intact, no facial, tongue midline, shrug 5/5  5/5 throughout, no drift  SILT

## 2019-04-24 NOTE — DIETITIAN INITIAL EVALUATION ADULT. - ADHERENCE
Pt reports not following any type of diet or restriction at home. Reports "occasionally" taking Vitamin C or Multivitamin PTA.

## 2019-04-24 NOTE — PROGRESS NOTE ADULT - SUBJECTIVE AND OBJECTIVE BOX
Neurology Follow up note    Subjective:Interval History - No events overnight    Objective:     Vital Signs Last 24 Hrs  T(C): 36.5 (24 Apr 2019 08:02), Max: 37 (23 Apr 2019 15:27)  T(F): 97.7 (24 Apr 2019 08:02), Max: 98.6 (23 Apr 2019 15:27)  HR: 96 (24 Apr 2019 08:02) (72 - 96)  BP: 99/53 (24 Apr 2019 08:02) (99/53 - 121/70)  BP(mean): --  RR: 18 (24 Apr 2019 08:02) (16 - 18)  SpO2: 96% (24 Apr 2019 08:02) (95% - 99%)    General Exam:   General appearance: No acute distress                   Mental Status: Orientated to self, date and place.  Attention intact.   PERRL, EOMI, v1-3 intact, no facial assymetry, tongue midline   5/5 in all four extremitits   Snehal positive RUE, LUE 3+  Babanski relative positive comapred to left ( left downgoging, right mute)   Sustained clonus on the right  Gait : deferred     Other:    04-22                          13.5   9.19  )-----------( 226      ( 24 Apr 2019 08:50 )             42.1     04-24    139  |  103  |  8   ----------------------------<  82  4.6   |  25  |  0.78    Ca    9.6      24 Apr 2019 06:05        Radiology    < from: MR Head w/wo IV Cont (04.19.19 @ 21:17) >    In comparison to 4/14/2019, increased size, enhancement and hyperintense   T2 and FLAIR signal likely edema within the left posterior frontal and   parietal periventricular white matter lesion containing central rounded   foci of hyperintense T2 signal with heterogeneity possibly necrosis,   differential includes neoplasm, additional etiologies including atypical   aggressive demyelinating plaques with Balo's concentric sclerosis, also   not completely excluded correlate with CSF serologies.    Redemonstration of unchanged foci of hyperintense T2 signal within the   right periventricular white matter.    Cervical and thoracic spinal MRI are unremarkable. Findings discussed   with Dr. Fleming at immediate time of review on 4/20/2019 at 7:55 AM.    < end of copied text >      MEDICATIONS  (STANDING):  gabapentin 100 milliGRAM(s) Oral three times a day  magnesium sulfate  IVPB 1 Gram(s) IV Intermittent once  melatonin 3 milliGRAM(s) Oral at bedtime  ondansetron Injectable 4 milliGRAM(s) IV Push every 8 hours    MEDICATIONS  (PRN):  acetaminophen   Tablet .. 650 milliGRAM(s) Oral every 6 hours PRN Temp greater or equal to 38C (100.4F), Mild Pain (1 - 3)  LORazepam     Tablet 0.5 milliGRAM(s) Oral three times a day PRN Anxiety

## 2019-04-24 NOTE — PROGRESS NOTE ADULT - ASSESSMENT
29 yr old F h/o anxiety, ITP, childhood migraine presented a week ago with worsening HA, nausea for 2 days. Her HA became unbearable with few episodes of nausea, emesis, photophobia and HA which worsens with sitting or standing upright improves with laying flat. LP was unremarkable. platelets 196. CT C/A/P showed nonspecific subcentimeter retroperitoneal and supraclavicular lymph nodes. MRI on 4/19 showed increased size, enhancement and hyperintense T2 and FLAIR signal within the left posterior frontal and parietal periventricular white matter lesion compared to MRI on 4/14.     Plan:  - Continue management per primary team  - Hold off giving steroids  - Plan for biopsy with Dr. Munguia's on Thursday  - Please obtain preop labs and NPO after midnight

## 2019-04-24 NOTE — DIETITIAN INITIAL EVALUATION ADULT. - OTHER INFO
Pt seen for length of stay initial assessment. Pt reports good appetite and PO intake. Noted 100% PO intake as per flow sheets. Denies difficulty chewing/swallowing. Pt states having nausea and vomiting at admission due to headache - denies further episodes at this time. Denies diarrhea or constipation, last BM yesterday (04/23). Denies weight changes PTA, reports UBW between 127-132 pounds. Weight as per flow sheets (04/18) 146.1 pounds -?accuracy, will continue to monitor.

## 2019-04-25 ENCOUNTER — RESULT REVIEW (OUTPATIENT)
Age: 29
End: 2019-04-25

## 2019-04-25 ENCOUNTER — APPOINTMENT (OUTPATIENT)
Dept: NEUROSURGERY | Facility: CLINIC | Age: 29
End: 2019-04-25

## 2019-04-25 LAB
ALBUMIN SERPL ELPH-MCNC: 3.9 G/DL — SIGNIFICANT CHANGE UP (ref 3.3–5)
ALP SERPL-CCNC: 43 U/L — SIGNIFICANT CHANGE UP (ref 40–120)
ALT FLD-CCNC: 11 U/L — SIGNIFICANT CHANGE UP (ref 10–45)
ANION GAP SERPL CALC-SCNC: 10 MMOL/L — SIGNIFICANT CHANGE UP (ref 5–17)
ANION GAP SERPL CALC-SCNC: 12 MMOL/L — SIGNIFICANT CHANGE UP (ref 5–17)
APTT BLD: 28.9 SEC — SIGNIFICANT CHANGE UP (ref 27.5–36.3)
AST SERPL-CCNC: 12 U/L — SIGNIFICANT CHANGE UP (ref 10–40)
BASOPHILS # BLD AUTO: 0.1 K/UL — SIGNIFICANT CHANGE UP (ref 0–0.2)
BASOPHILS NFR BLD AUTO: 0.7 % — SIGNIFICANT CHANGE UP (ref 0–2)
BILIRUB SERPL-MCNC: 0.3 MG/DL — SIGNIFICANT CHANGE UP (ref 0.2–1.2)
BUN SERPL-MCNC: 7 MG/DL — SIGNIFICANT CHANGE UP (ref 7–23)
BUN SERPL-MCNC: 7 MG/DL — SIGNIFICANT CHANGE UP (ref 7–23)
CALCIUM SERPL-MCNC: 8.6 MG/DL — SIGNIFICANT CHANGE UP (ref 8.4–10.5)
CALCIUM SERPL-MCNC: 9.4 MG/DL — SIGNIFICANT CHANGE UP (ref 8.4–10.5)
CHLORIDE SERPL-SCNC: 104 MMOL/L — SIGNIFICANT CHANGE UP (ref 96–108)
CHLORIDE SERPL-SCNC: 106 MMOL/L — SIGNIFICANT CHANGE UP (ref 96–108)
CO2 SERPL-SCNC: 21 MMOL/L — LOW (ref 22–31)
CO2 SERPL-SCNC: 25 MMOL/L — SIGNIFICANT CHANGE UP (ref 22–31)
CREAT SERPL-MCNC: 0.77 MG/DL — SIGNIFICANT CHANGE UP (ref 0.5–1.3)
CREAT SERPL-MCNC: 0.86 MG/DL — SIGNIFICANT CHANGE UP (ref 0.5–1.3)
EOSINOPHIL # BLD AUTO: 0.5 K/UL — SIGNIFICANT CHANGE UP (ref 0–0.5)
EOSINOPHIL NFR BLD AUTO: 5.7 % — SIGNIFICANT CHANGE UP (ref 0–6)
GLUCOSE SERPL-MCNC: 85 MG/DL — SIGNIFICANT CHANGE UP (ref 70–99)
GLUCOSE SERPL-MCNC: 85 MG/DL — SIGNIFICANT CHANGE UP (ref 70–99)
HCT VFR BLD CALC: 38.4 % — SIGNIFICANT CHANGE UP (ref 34.5–45)
HCT VFR BLD CALC: 43.6 % — SIGNIFICANT CHANGE UP (ref 34.5–45)
HGB BLD-MCNC: 13.1 G/DL — SIGNIFICANT CHANGE UP (ref 11.5–15.5)
HGB BLD-MCNC: 14.1 G/DL — SIGNIFICANT CHANGE UP (ref 11.5–15.5)
INR BLD: 1 RATIO — SIGNIFICANT CHANGE UP (ref 0.88–1.16)
LYMPHOCYTES # BLD AUTO: 2.6 K/UL — SIGNIFICANT CHANGE UP (ref 1–3.3)
LYMPHOCYTES # BLD AUTO: 32.5 % — SIGNIFICANT CHANGE UP (ref 13–44)
MAGNESIUM SERPL-MCNC: 2.1 MG/DL — SIGNIFICANT CHANGE UP (ref 1.6–2.6)
MBP CSF-MCNC: <2 MCG/L — LOW (ref 2–4)
MCHC RBC-ENTMCNC: 28.2 PG — SIGNIFICANT CHANGE UP (ref 27–34)
MCHC RBC-ENTMCNC: 30 PG — SIGNIFICANT CHANGE UP (ref 27–34)
MCHC RBC-ENTMCNC: 32.3 GM/DL — SIGNIFICANT CHANGE UP (ref 32–36)
MCHC RBC-ENTMCNC: 34.2 GM/DL — SIGNIFICANT CHANGE UP (ref 32–36)
MCV RBC AUTO: 87.2 FL — SIGNIFICANT CHANGE UP (ref 80–100)
MCV RBC AUTO: 87.6 FL — SIGNIFICANT CHANGE UP (ref 80–100)
MONOCYTES # BLD AUTO: 0.4 K/UL — SIGNIFICANT CHANGE UP (ref 0–0.9)
MONOCYTES NFR BLD AUTO: 5.5 % — SIGNIFICANT CHANGE UP (ref 2–14)
NEUTROPHILS # BLD AUTO: 4.5 K/UL — SIGNIFICANT CHANGE UP (ref 1.8–7.4)
NEUTROPHILS NFR BLD AUTO: 55.6 % — SIGNIFICANT CHANGE UP (ref 43–77)
PHOSPHATE SERPL-MCNC: 3.1 MG/DL — SIGNIFICANT CHANGE UP (ref 2.5–4.5)
PLATELET # BLD AUTO: 186 K/UL — SIGNIFICANT CHANGE UP (ref 150–400)
PLATELET # BLD AUTO: 233 K/UL — SIGNIFICANT CHANGE UP (ref 150–400)
POTASSIUM SERPL-MCNC: 4.3 MMOL/L — SIGNIFICANT CHANGE UP (ref 3.5–5.3)
POTASSIUM SERPL-MCNC: 4.7 MMOL/L — SIGNIFICANT CHANGE UP (ref 3.5–5.3)
POTASSIUM SERPL-SCNC: 4.3 MMOL/L — SIGNIFICANT CHANGE UP (ref 3.5–5.3)
POTASSIUM SERPL-SCNC: 4.7 MMOL/L — SIGNIFICANT CHANGE UP (ref 3.5–5.3)
PROT SERPL-MCNC: 6.4 G/DL — SIGNIFICANT CHANGE UP (ref 6–8.3)
PROTHROM AB SERPL-ACNC: 11.3 SEC — SIGNIFICANT CHANGE UP (ref 10–13.1)
RBC # BLD: 4.38 M/UL — SIGNIFICANT CHANGE UP (ref 3.8–5.2)
RBC # BLD: 5 M/UL — SIGNIFICANT CHANGE UP (ref 3.8–5.2)
RBC # FLD: 12.4 % — SIGNIFICANT CHANGE UP (ref 10.3–14.5)
RBC # FLD: 13.2 % — SIGNIFICANT CHANGE UP (ref 10.3–14.5)
SODIUM SERPL-SCNC: 139 MMOL/L — SIGNIFICANT CHANGE UP (ref 135–145)
SODIUM SERPL-SCNC: 139 MMOL/L — SIGNIFICANT CHANGE UP (ref 135–145)
WBC # BLD: 7.41 K/UL — SIGNIFICANT CHANGE UP (ref 3.8–10.5)
WBC # BLD: 8 K/UL — SIGNIFICANT CHANGE UP (ref 3.8–10.5)
WBC # FLD AUTO: 7.41 K/UL — SIGNIFICANT CHANGE UP (ref 3.8–10.5)
WBC # FLD AUTO: 8 K/UL — SIGNIFICANT CHANGE UP (ref 3.8–10.5)

## 2019-04-25 PROCEDURE — 88331 PATH CONSLTJ SURG 1 BLK 1SPC: CPT | Mod: 26

## 2019-04-25 PROCEDURE — 88313 SPECIAL STAINS GROUP 2: CPT | Mod: 26

## 2019-04-25 PROCEDURE — 99231 SBSQ HOSP IP/OBS SF/LOW 25: CPT

## 2019-04-25 PROCEDURE — 88334 PATH CONSLTJ SURG CYTO XM EA: CPT | Mod: 26,59

## 2019-04-25 PROCEDURE — 88307 TISSUE EXAM BY PATHOLOGIST: CPT | Mod: 26

## 2019-04-25 PROCEDURE — 88341 IMHCHEM/IMCYTCHM EA ADD ANTB: CPT | Mod: 26,59

## 2019-04-25 PROCEDURE — 88342 IMHCHEM/IMCYTCHM 1ST ANTB: CPT | Mod: 26,59

## 2019-04-25 PROCEDURE — 88360 TUMOR IMMUNOHISTOCHEM/MANUAL: CPT | Mod: 26

## 2019-04-25 PROCEDURE — 70450 CT HEAD/BRAIN W/O DYE: CPT | Mod: 26

## 2019-04-25 PROCEDURE — 61750 INCISE SKULL/BRAIN BIOPSY: CPT

## 2019-04-25 RX ORDER — DEXAMETHASONE 0.5 MG/5ML
4 ELIXIR ORAL
Qty: 0 | Refills: 0 | Status: DISCONTINUED | OUTPATIENT
Start: 2019-04-25 | End: 2019-04-26

## 2019-04-25 RX ORDER — OXYCODONE HYDROCHLORIDE 5 MG/1
10 TABLET ORAL EVERY 4 HOURS
Qty: 0 | Refills: 0 | Status: DISCONTINUED | OUTPATIENT
Start: 2019-04-25 | End: 2019-04-26

## 2019-04-25 RX ORDER — FAMOTIDINE 10 MG/ML
20 INJECTION INTRAVENOUS ONCE
Qty: 0 | Refills: 0 | Status: COMPLETED | OUTPATIENT
Start: 2019-04-25 | End: 2019-04-25

## 2019-04-25 RX ORDER — LANOLIN ALCOHOL/MO/W.PET/CERES
3 CREAM (GRAM) TOPICAL AT BEDTIME
Qty: 0 | Refills: 0 | Status: DISCONTINUED | OUTPATIENT
Start: 2019-04-25 | End: 2019-04-26

## 2019-04-25 RX ORDER — ACETAMINOPHEN 500 MG
325 TABLET ORAL EVERY 4 HOURS
Qty: 0 | Refills: 0 | Status: DISCONTINUED | OUTPATIENT
Start: 2019-04-25 | End: 2019-04-26

## 2019-04-25 RX ORDER — FENTANYL CITRATE 50 UG/ML
50 INJECTION INTRAVENOUS
Qty: 0 | Refills: 0 | Status: DISCONTINUED | OUTPATIENT
Start: 2019-04-25 | End: 2019-04-25

## 2019-04-25 RX ORDER — ACETAMINOPHEN 500 MG
1000 TABLET ORAL ONCE
Qty: 0 | Refills: 0 | Status: COMPLETED | OUTPATIENT
Start: 2019-04-25 | End: 2019-04-25

## 2019-04-25 RX ORDER — PANTOPRAZOLE SODIUM 20 MG/1
40 TABLET, DELAYED RELEASE ORAL
Qty: 0 | Refills: 0 | Status: DISCONTINUED | OUTPATIENT
Start: 2019-04-25 | End: 2019-04-26

## 2019-04-25 RX ORDER — ONDANSETRON 8 MG/1
4 TABLET, FILM COATED ORAL EVERY 4 HOURS
Qty: 0 | Refills: 0 | Status: DISCONTINUED | OUTPATIENT
Start: 2019-04-25 | End: 2019-04-25

## 2019-04-25 RX ORDER — GABAPENTIN 400 MG/1
100 CAPSULE ORAL THREE TIMES A DAY
Qty: 0 | Refills: 0 | Status: DISCONTINUED | OUTPATIENT
Start: 2019-04-25 | End: 2019-04-26

## 2019-04-25 RX ORDER — OXYCODONE HYDROCHLORIDE 5 MG/1
5 TABLET ORAL EVERY 4 HOURS
Qty: 0 | Refills: 0 | Status: DISCONTINUED | OUTPATIENT
Start: 2019-04-25 | End: 2019-04-25

## 2019-04-25 RX ORDER — DIPHENHYDRAMINE HCL 50 MG
12.5 CAPSULE ORAL ONCE
Qty: 0 | Refills: 0 | Status: COMPLETED | OUTPATIENT
Start: 2019-04-25 | End: 2019-04-25

## 2019-04-25 RX ORDER — SODIUM CHLORIDE 9 MG/ML
1000 INJECTION INTRAMUSCULAR; INTRAVENOUS; SUBCUTANEOUS
Qty: 0 | Refills: 0 | Status: DISCONTINUED | OUTPATIENT
Start: 2019-04-25 | End: 2019-04-26

## 2019-04-25 RX ORDER — OXYCODONE HYDROCHLORIDE 5 MG/1
5 TABLET ORAL ONCE
Qty: 0 | Refills: 0 | Status: DISCONTINUED | OUTPATIENT
Start: 2019-04-25 | End: 2019-04-25

## 2019-04-25 RX ORDER — CEFAZOLIN SODIUM 1 G
1000 VIAL (EA) INJECTION EVERY 8 HOURS
Qty: 0 | Refills: 0 | Status: COMPLETED | OUTPATIENT
Start: 2019-04-25 | End: 2019-04-26

## 2019-04-25 RX ORDER — FENTANYL CITRATE 50 UG/ML
25 INJECTION INTRAVENOUS
Qty: 0 | Refills: 0 | Status: DISCONTINUED | OUTPATIENT
Start: 2019-04-25 | End: 2019-04-25

## 2019-04-25 RX ADMIN — FAMOTIDINE 20 MILLIGRAM(S): 10 INJECTION INTRAVENOUS at 17:45

## 2019-04-25 RX ADMIN — ONDANSETRON 4 MILLIGRAM(S): 8 TABLET, FILM COATED ORAL at 06:35

## 2019-04-25 RX ADMIN — OXYCODONE HYDROCHLORIDE 5 MILLIGRAM(S): 5 TABLET ORAL at 21:00

## 2019-04-25 RX ADMIN — Medication 400 MILLIGRAM(S): at 23:10

## 2019-04-25 RX ADMIN — OXYCODONE HYDROCHLORIDE 5 MILLIGRAM(S): 5 TABLET ORAL at 21:30

## 2019-04-25 RX ADMIN — Medication 4 MILLIGRAM(S): at 23:12

## 2019-04-25 RX ADMIN — Medication 3 MILLIGRAM(S): at 23:12

## 2019-04-25 RX ADMIN — GABAPENTIN 100 MILLIGRAM(S): 400 CAPSULE ORAL at 06:35

## 2019-04-25 RX ADMIN — Medication 100 MILLIGRAM(S): at 23:15

## 2019-04-25 RX ADMIN — GABAPENTIN 100 MILLIGRAM(S): 400 CAPSULE ORAL at 23:12

## 2019-04-25 RX ADMIN — OXYCODONE HYDROCHLORIDE 5 MILLIGRAM(S): 5 TABLET ORAL at 23:13

## 2019-04-25 RX ADMIN — Medication 12.5 MILLIGRAM(S): at 17:45

## 2019-04-25 NOTE — BRIEF OPERATIVE NOTE - NSICDXBRIEFPOSTOP_GEN_ALL_CORE_FT
POST-OP DIAGNOSIS:  Intracranial space-occupying lesion on diagnostic imaging 25-Apr-2019 13:45:45  Peto, Ivo

## 2019-04-25 NOTE — PROGRESS NOTE ADULT - ASSESSMENT
29 yr old F h/o anxiety, ITP, childhood migraine presented a week ago with worsening HA, nausea for 2 days. Her HA became unbearable with few episodes of nausea, emesis, photophobia and HA which worsens with sitting or standing upright improves with laying flat. LP was unremarkable. platelets 196. CT C/A/P showed nonspecific subcentimeter retroperitoneal and supraclavicular lymph nodes. MRI on 4/19 showed increased size, enhancement and hyperintense T2 and FLAIR signal within the left posterior frontal and parietal periventricular white matter lesion compared to MRI on 4/14.     Plan:  - OR today

## 2019-04-25 NOTE — PRE-ANESTHESIA EVALUATION ADULT - NSANTHPEFT_GEN_ALL_CORE
General: Alert and oriented x 3, well-groomed  Heart: RRR, no M/G/R  Lungs: CTABL  Neuro: Grossly intact

## 2019-04-25 NOTE — PROGRESS NOTE ADULT - SUBJECTIVE AND OBJECTIVE BOX
Patient seen and examined at bedside.    T(C): 36.7 (04-25-19 @ 05:10), Max: 37.1 (04-24-19 @ 15:19)  HR: 61 (04-25-19 @ 05:10) (61 - 96)  BP: 114/76 (04-25-19 @ 05:10) (99/53 - 127/82)  RR: 18 (04-25-19 @ 05:10) (18 - 18)  SpO2: 97% (04-25-19 @ 05:10) (96% - 98%)  Wt(kg): --    Exam:    AOx3, FC, PERRL, EOMI, V1-3 intact, no facial, tongue midline, shrug 5/5  5/5 throughout, no drift  SILT

## 2019-04-25 NOTE — PROGRESS NOTE ADULT - SUBJECTIVE AND OBJECTIVE BOX
Neurology Follow up note    Subjective:Interval History - No events overnight, NPO for surgery     Objective:     Vital Signs Last 24 Hrs  T(C): 36.7 (25 Apr 2019 05:10), Max: 37.1 (24 Apr 2019 15:19)  T(F): 98.1 (25 Apr 2019 05:10), Max: 98.8 (24 Apr 2019 15:19)  HR: 61 (25 Apr 2019 05:10) (61 - 96)  BP: 114/76 (25 Apr 2019 05:10) (99/53 - 127/82)  BP(mean): --  RR: 18 (25 Apr 2019 05:10) (18 - 18)  SpO2: 97% (25 Apr 2019 05:10) (96% - 98%)    General Exam:   General appearance: No acute distress                   Mental Status: Orientated to self, date and place.  Attention intact.   PERRL, EOMI, v1-3 intact, no facial assymetry, tongue midline   5/5 in all four extremitits   Snehal positive RUE, LUE 3+  Babanski relative positive comapred to left ( left downgoging, right mute)   Sustained clonus on the right  Gait : deferred     Other:    04-22                          13.5   9.19  )-----------( 226      ( 24 Apr 2019 08:50 )             42.1     04-24    139  |  103  |  8   ----------------------------<  82  4.6   |  25  |  0.78    Ca    9.6      24 Apr 2019 06:05        Radiology    < from: MR Head w/wo IV Cont (04.19.19 @ 21:17) >    In comparison to 4/14/2019, increased size, enhancement and hyperintense   T2 and FLAIR signal likely edema within the left posterior frontal and   parietal periventricular white matter lesion containing central rounded   foci of hyperintense T2 signal with heterogeneity possibly necrosis,   differential includes neoplasm, additional etiologies including atypical   aggressive demyelinating plaques with Balo's concentric sclerosis, also   not completely excluded correlate with CSF serologies.    Redemonstration of unchanged foci of hyperintense T2 signal within the   right periventricular white matter.    Cervical and thoracic spinal MRI are unremarkable. Findings discussed   with Dr. Fleming at immediate time of review on 4/20/2019 at 7:55 AM.    < end of copied text >      MEDICATIONS  (STANDING):  gabapentin 100 milliGRAM(s) Oral three times a day  magnesium sulfate  IVPB 1 Gram(s) IV Intermittent once  melatonin 3 milliGRAM(s) Oral at bedtime  ondansetron Injectable 4 milliGRAM(s) IV Push every 8 hours    MEDICATIONS  (PRN):  acetaminophen   Tablet .. 650 milliGRAM(s) Oral every 6 hours PRN Temp greater or equal to 38C (100.4F), Mild Pain (1 - 3)  LORazepam     Tablet 0.5 milliGRAM(s) Oral three times a day PRN Anxiety

## 2019-04-25 NOTE — PROGRESS NOTE ADULT - ASSESSMENT
29 yr old s/p bx    Plan:  - CTH  - Pain control  -PT 29 yr old s/p bx    Plan:  - CTH  - Pain control  -PT  - Management per neurology  - Can be on steroids if indicated

## 2019-04-25 NOTE — BRIEF OPERATIVE NOTE - NSICDXBRIEFPREOP_GEN_ALL_CORE_FT
PRE-OP DIAGNOSIS:  Intracranial space-occupying lesion on diagnostic imaging 25-Apr-2019 13:45:29  Peto, Ivo

## 2019-04-25 NOTE — PROGRESS NOTE ADULT - SUBJECTIVE AND OBJECTIVE BOX
Patient seen and examined at bedside.    T(C): 36.3 (04-25-19 @ 16:40), Max: 36.9 (04-25-19 @ 08:13)  HR: 104 (04-25-19 @ 17:45) (61 - 104)  BP: 123/72 (04-25-19 @ 17:45) (94/57 - 123/72)  RR: 16 (04-25-19 @ 17:45) (16 - 18)  SpO2: 98% (04-25-19 @ 17:45) (97% - 99%)  Wt(kg): --    Exam:    AOx3, FC, PERRL, EOMI, V1-3 intact, no facial, tongue midline, shrug 5/5  5/5 throughout, no drift  SILT  No clonus or babinski Patient seen and examined at bedside.    T(C): 36.3 (04-25-19 @ 16:40), Max: 36.9 (04-25-19 @ 08:13)  HR: 104 (04-25-19 @ 17:45) (61 - 104)  BP: 123/72 (04-25-19 @ 17:45) (94/57 - 123/72)  RR: 16 (04-25-19 @ 17:45) (16 - 18)  SpO2: 98% (04-25-19 @ 17:45) (97% - 99%)  Wt(kg): --    Exam:    AOx3, FC, PERRL, EOMI, V1-3 intact, no facial, tongue midline, shrug 5/5  5/5 throughout, no drift  SILT

## 2019-04-25 NOTE — PRE-ANESTHESIA EVALUATION ADULT - NSANTHPMHFT_GEN_ALL_CORE
29 F with history of anxiety on Xanax, ITP as a child, platelet count normal currently, admitted with severe HA, MRI showing L parietal  mass with edema, differential is tumefactive MS vs CNS lymphoma, and periventricular white matter lesion. 29 F with history of anxiety on Xanax, ITP as a child, platelet count normal currently, admitted with severe HA, peripheral vision cut, MRI showing L parietal  mass with edema, differential is tumefactive MS vs CNS lymphoma, and periventricular white matter lesion. motor, sensory grossly intact

## 2019-04-25 NOTE — PRE-ANESTHESIA EVALUATION ADULT - NSANTHOSAYNRD_GEN_A_CORE
No. HARI screening performed.  STOP BANG Legend: 0-2 = LOW Risk; 3-4 = INTERMEDIATE Risk; 5-8 = HIGH Risk

## 2019-04-25 NOTE — PROGRESS NOTE ADULT - ASSESSMENT
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 CT head showed worsening edema compared to prior MRI brain. Exam significant for Brooks's sign in RUE and relative babinski on right. MRI brain showed increase size of enhancement compared to prior MRI brain. MRI cervical spine and T spine are unremarkable.    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 CT head showed worsening edema compared to prior MRI brain. Exam significant for Brooks's sign in RUE and relative babinski on right. MRI brain showed increase size of enhancement compared to prior MRI brain. MRI cervical spine and T spine are unremarkable.    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    Plan:    [x] Surgery consulted for excisional biopsy of left supraclavicular lymph node, too small suggest doing under IR and/or after PET  [x] Neurosurgery for brain bx - scheduled today  [x] Medicine for clearance  [x] Holding off on steroids since asymptomatic  [x]  (neuro-onc) on board - patient will follow outpatient with    [x] Symptomatic treatment for headache, conservative management suggested by anesthesia, now resolved  [x] prn anxiolytics Xanax 0.5 mg TID prn

## 2019-04-26 ENCOUNTER — TRANSCRIPTION ENCOUNTER (OUTPATIENT)
Age: 29
End: 2019-04-26

## 2019-04-26 VITALS
SYSTOLIC BLOOD PRESSURE: 127 MMHG | HEART RATE: 74 BPM | OXYGEN SATURATION: 96 % | DIASTOLIC BLOOD PRESSURE: 85 MMHG | TEMPERATURE: 99 F | RESPIRATION RATE: 18 BRPM

## 2019-04-26 PROCEDURE — 99231 SBSQ HOSP IP/OBS SF/LOW 25: CPT

## 2019-04-26 RX ORDER — ACETAMINOPHEN 500 MG
1000 TABLET ORAL ONCE
Qty: 0 | Refills: 0 | Status: COMPLETED | OUTPATIENT
Start: 2019-04-26 | End: 2019-04-26

## 2019-04-26 RX ORDER — ONDANSETRON 8 MG/1
4 TABLET, FILM COATED ORAL EVERY 4 HOURS
Qty: 0 | Refills: 0 | Status: DISCONTINUED | OUTPATIENT
Start: 2019-04-26 | End: 2019-04-26

## 2019-04-26 RX ADMIN — OXYCODONE HYDROCHLORIDE 10 MILLIGRAM(S): 5 TABLET ORAL at 13:08

## 2019-04-26 RX ADMIN — PANTOPRAZOLE SODIUM 40 MILLIGRAM(S): 20 TABLET, DELAYED RELEASE ORAL at 05:04

## 2019-04-26 RX ADMIN — OXYCODONE HYDROCHLORIDE 5 MILLIGRAM(S): 5 TABLET ORAL at 00:40

## 2019-04-26 RX ADMIN — Medication 4 MILLIGRAM(S): at 12:02

## 2019-04-26 RX ADMIN — OXYCODONE HYDROCHLORIDE 10 MILLIGRAM(S): 5 TABLET ORAL at 12:01

## 2019-04-26 RX ADMIN — Medication 100 MILLIGRAM(S): at 13:13

## 2019-04-26 RX ADMIN — Medication 400 MILLIGRAM(S): at 12:01

## 2019-04-26 RX ADMIN — Medication 100 MILLIGRAM(S): at 05:04

## 2019-04-26 RX ADMIN — GABAPENTIN 100 MILLIGRAM(S): 400 CAPSULE ORAL at 05:04

## 2019-04-26 RX ADMIN — GABAPENTIN 100 MILLIGRAM(S): 400 CAPSULE ORAL at 13:13

## 2019-04-26 RX ADMIN — OXYCODONE HYDROCHLORIDE 10 MILLIGRAM(S): 5 TABLET ORAL at 03:36

## 2019-04-26 RX ADMIN — Medication 4 MILLIGRAM(S): at 05:04

## 2019-04-26 RX ADMIN — Medication 1000 MILLIGRAM(S): at 00:10

## 2019-04-26 RX ADMIN — Medication 400 MILLIGRAM(S): at 05:15

## 2019-04-26 RX ADMIN — Medication 1000 MILLIGRAM(S): at 13:09

## 2019-04-26 NOTE — PROGRESS NOTE ADULT - SUBJECTIVE AND OBJECTIVE BOX
Patient seen and examined at bedside.    T(C): 36.8 (04-26-19 @ 04:30), Max: 36.9 (04-25-19 @ 08:13)  HR: 76 (04-26-19 @ 04:30) (62 - 104)  BP: 109/69 (04-26-19 @ 04:30) (94/57 - 124/73)  RR: 18 (04-26-19 @ 04:30) (16 - 18)  SpO2: 97% (04-26-19 @ 04:30) (97% - 100%)  Wt(kg): --    Exam:    AOx3, FC, PERRL, EOMI, V1-3 intact, no facial, tongue midline, shrug 5/5  5/5 throughout, no drift  SILT  Incision c/d/i

## 2019-04-26 NOTE — DISCHARGE NOTE PROVIDER - HOSPITAL COURSE
28 yo woman with a PMHx of anxiety, ITP, childhood migraine presents with worsening HA, nausea for 2 days after being discharged on 4/16 during which she got an LP on 4/14.  Pt was admitted previously for R visual field cut , mild HA for 2 weeks. MRI brain showed T2 flair lesion in left parietal region extending into left posterior frontal that enhances with contrast.        Repeat CT brain was done during this admission which showed more edema in the left frontotemporal mass. She underwent repeat MRI brain which showed increased size and enhancement of lesion. General surgery was called for excisional lymph node biopsy, but was not candidate because lymph nodes were non-palpable. She underwent brain biopsy with Dr. Munguia. No complications. Patient was stabilized and discharged home with outpatient neuro-oncology follow up. 30 yo woman with a PMHx of anxiety, ITP, childhood migraine presents with worsening HA, nausea for 2 days after being discharged on 4/16 during which she got an LP on 4/14.  Pt was admitted previously for R visual field cut , mild HA for 2 weeks. MRI brain showed T2 flair lesion in left parietal region extending into left posterior frontal that enhances with contrast.        Repeat CT brain was done during this admission which showed more edema in the left frontotemporal mass. She underwent repeat MRI brain which showed increased size and enhancement of lesion. General surgery was called for excisional lymph node biopsy, but was not candidate because lymph nodes were non-palpable. She underwent brain biopsy with Dr. Munguia. No complications. Patient was stabilized and discharged home with outpatient neuro-oncology follow up for surgical pathology results. 28 yo woman with a PMHx of anxiety, ITP, childhood migraine presents with worsening HA, nausea for 2 days after being discharged on 4/16 during which she got an LP on 4/14.  Pt was admitted previously for R visual field cut , mild HA for 2 weeks. MRI brain showed T2 flair lesion in left parietal region extending into left posterior frontal that enhances with contrast.        Repeat CT brain was done during this admission which showed more edema in the left frontotemporal mass. She underwent repeat MRI brain which showed increased size and enhancement of lesion. General surgery was called for excisional lymph node biopsy, but was not candidate because lymph nodes were non-palpable. She underwent brain biopsy with Dr. Munguia. No complications. She remained neurologically stable with only minor right arm drift and few beats of clonus in the right ankle, without any complaints. Patient was stabilized and discharged home with outpatient neuro-oncology follow up for surgical pathology results.

## 2019-04-26 NOTE — PROGRESS NOTE ADULT - SUBJECTIVE AND OBJECTIVE BOX
Neurology Follow up note    Patient is a 29y old  Female who presents with a chief complaint of severe HA (26 Apr 2019 05:49)      Subjective: Interval History - tolerated biopsy well. No numbness/tingling, no weakness reported. had HA, received tylenol and oxycodone. no visual complaints.    Objective:   Vital Signs Last 24 Hrs  T(C): 36.8 (26 Apr 2019 08:00), Max: 36.9 (25 Apr 2019 23:40)  T(F): 98.3 (26 Apr 2019 08:00), Max: 98.5 (25 Apr 2019 23:40)  HR: 78 (26 Apr 2019 08:00) (62 - 104)  BP: 116/77 (26 Apr 2019 08:00) (108/55 - 124/73)BP(mean): 87 (25 Apr 2019 21:00) (75 - 91)  RR: 19 (26 Apr 2019 08:00) (16 - 19)  SpO2: 97% (26 Apr 2019 08:00) (97% - 100%)    General Exam:   General appearance: No acute distress                   Mental Status: Orientated to self, date and place.  Attention intact.   PERRL, EOMI, v1-3 intact, no facial assymetry, tongue midline   5/5 in all four extremitits   Brooks positive RUE, LUE 3+  Babanski relative positive comapred to left ( left downgoging, right mute)   Sustained clonus on the right  Gait : deferred     Other:    04-25    139  |  106  |  7   ----------------------------<  85  4.3   |  21<L>  |  0.77    Ca    8.6      25 Apr 2019 17:38  Phos  3.1     04-25  Mg     2.1     04-25    TPro  6.4  /  Alb  3.9  /  TBili  0.3  /  DBili  x   /  AST  12  /  ALT  11  /  AlkPhos  43  04-25 04-25    139  |  106  |  7   ----------------------------<  85  4.3   |  21<L>  |  0.77    Ca    8.6      25 Apr 2019 17:38  Phos  3.1     04-25  Mg     2.1     04-25    TPro  6.4  /  Alb  3.9  /  TBili  0.3  /  DBili  x   /  AST  12  /  ALT  11  /  AlkPhos  43  04-25    LIVER FUNCTIONS - ( 25 Apr 2019 17:38 )  Alb: 3.9 g/dL / Pro: 6.4 g/dL / ALK PHOS: 43 U/L / ALT: 11 U/L / AST: 12 U/L / GGT: x                                 13.1   8.0   )-----------( 186      ( 25 Apr 2019 17:38 )             38.4     Radiology    EKG:  tele:  TTE:  EEG:      MEDICATIONS  (STANDING):  ceFAZolin   IVPB 1000 milliGRAM(s) IV Intermittent every 8 hours  dexamethasone     Tablet 4 milliGRAM(s) Oral four times a day  gabapentin 100 milliGRAM(s) Oral three times a day  magnesium sulfate  IVPB 1 Gram(s) IV Intermittent once  melatonin 3 milliGRAM(s) Oral at bedtime  pantoprazole    Tablet 40 milliGRAM(s) Oral before breakfast  sodium chloride 0.9%. 1000 milliLiter(s) (84 mL/Hr) IV Continuous <Continuous>    MEDICATIONS  (PRN):  acetaminophen   Tablet .. 325 milliGRAM(s) Oral every 4 hours PRN Mild Pain (1 - 3)  LORazepam     Tablet 0.5 milliGRAM(s) Oral three times a day PRN Anxiety  oxyCODONE    IR 10 milliGRAM(s) Oral every 4 hours PRN Severe Pain (7 - 10) Neurology Follow up note    Patient is a 29y old  Female who presents with a chief complaint of severe HA (26 Apr 2019 05:49)      Subjective: Interval History - tolerated biopsy well. No numbness/tingling, no weakness reported. had HA, received tylenol and oxycodone. no visual complaints.    Objective:   Vital Signs Last 24 Hrs  T(C): 36.8 (26 Apr 2019 08:00), Max: 36.9 (25 Apr 2019 23:40)  T(F): 98.3 (26 Apr 2019 08:00), Max: 98.5 (25 Apr 2019 23:40)  HR: 78 (26 Apr 2019 08:00) (62 - 104)  BP: 116/77 (26 Apr 2019 08:00) (108/55 - 124/73)BP(mean): 87 (25 Apr 2019 21:00) (75 - 91)  RR: 19 (26 Apr 2019 08:00) (16 - 19)  SpO2: 97% (26 Apr 2019 08:00) (97% - 100%)    General Exam:   General appearance: No acute distress                   Mental Status: Orientated to self, date and place.  Attention intact.   PERRL, EOMI, no facial assymetry, tongue midline   5/5 in all four extremitits   Babanski relative positive comapred to left ( left downgoging, right mute)   3 beats clonus on the right  Gait : deferred     Other:    04-25    139  |  106  |  7   ----------------------------<  85  4.3   |  21<L>  |  0.77    Ca    8.6      25 Apr 2019 17:38  Phos  3.1     04-25  Mg     2.1     04-25    TPro  6.4  /  Alb  3.9  /  TBili  0.3  /  DBili  x   /  AST  12  /  ALT  11  /  AlkPhos  43  04-25 04-25    139  |  106  |  7   ----------------------------<  85  4.3   |  21<L>  |  0.77    Ca    8.6      25 Apr 2019 17:38  Phos  3.1     04-25  Mg     2.1     04-25    TPro  6.4  /  Alb  3.9  /  TBili  0.3  /  DBili  x   /  AST  12  /  ALT  11  /  AlkPhos  43  04-25    LIVER FUNCTIONS - ( 25 Apr 2019 17:38 )  Alb: 3.9 g/dL / Pro: 6.4 g/dL / ALK PHOS: 43 U/L / ALT: 11 U/L / AST: 12 U/L / GGT: x                                 13.1   8.0   )-----------( 186      ( 25 Apr 2019 17:38 )             38.4     Radiology    EKG:  tele:  TTE:  EEG:      MEDICATIONS  (STANDING):  ceFAZolin   IVPB 1000 milliGRAM(s) IV Intermittent every 8 hours  dexamethasone     Tablet 4 milliGRAM(s) Oral four times a day  gabapentin 100 milliGRAM(s) Oral three times a day  magnesium sulfate  IVPB 1 Gram(s) IV Intermittent once  melatonin 3 milliGRAM(s) Oral at bedtime  pantoprazole    Tablet 40 milliGRAM(s) Oral before breakfast  sodium chloride 0.9%. 1000 milliLiter(s) (84 mL/Hr) IV Continuous <Continuous>    MEDICATIONS  (PRN):  acetaminophen   Tablet .. 325 milliGRAM(s) Oral every 4 hours PRN Mild Pain (1 - 3)  LORazepam     Tablet 0.5 milliGRAM(s) Oral three times a day PRN Anxiety  oxyCODONE    IR 10 milliGRAM(s) Oral every 4 hours PRN Severe Pain (7 - 10) Neurology Follow up note    Patient is a 29y old  Female who presents with a chief complaint of severe HA (26 Apr 2019 05:49)      Subjective: Interval History - tolerated biopsy well. No numbness/tingling, no weakness reported. had HA, received tylenol and oxycodone. no visual complaints.    Objective:   Vital Signs Last 24 Hrs  T(C): 36.8 (26 Apr 2019 08:00), Max: 36.9 (25 Apr 2019 23:40)  T(F): 98.3 (26 Apr 2019 08:00), Max: 98.5 (25 Apr 2019 23:40)  HR: 78 (26 Apr 2019 08:00) (62 - 104)  BP: 116/77 (26 Apr 2019 08:00) (108/55 - 124/73)BP(mean): 87 (25 Apr 2019 21:00) (75 - 91)  RR: 19 (26 Apr 2019 08:00) (16 - 19)  SpO2: 97% (26 Apr 2019 08:00) (97% - 100%)    General Exam:   General appearance: No acute distress                   Mental Status: Orientated to self, date and place.  Attention intact.   PERRL, EOMI, no facial assymetry, tongue midline   5/5 in all four extremities, subtle RUE drift, rapid finger movements intact  few beats clonus on the right ankle  Gait : deferred     Other:    04-25    139  |  106  |  7   ----------------------------<  85  4.3   |  21<L>  |  0.77    Ca    8.6      25 Apr 2019 17:38  Phos  3.1     04-25  Mg     2.1     04-25    TPro  6.4  /  Alb  3.9  /  TBili  0.3  /  DBili  x   /  AST  12  /  ALT  11  /  AlkPhos  43  04-25 04-25    139  |  106  |  7   ----------------------------<  85  4.3   |  21<L>  |  0.77    Ca    8.6      25 Apr 2019 17:38  Phos  3.1     04-25  Mg     2.1     04-25    TPro  6.4  /  Alb  3.9  /  TBili  0.3  /  DBili  x   /  AST  12  /  ALT  11  /  AlkPhos  43  04-25    LIVER FUNCTIONS - ( 25 Apr 2019 17:38 )  Alb: 3.9 g/dL / Pro: 6.4 g/dL / ALK PHOS: 43 U/L / ALT: 11 U/L / AST: 12 U/L / GGT: x                                 13.1   8.0   )-----------( 186      ( 25 Apr 2019 17:38 )             38.4     Radiology    EKG:  tele:  TTE:  EEG:      MEDICATIONS  (STANDING):  ceFAZolin   IVPB 1000 milliGRAM(s) IV Intermittent every 8 hours  dexamethasone     Tablet 4 milliGRAM(s) Oral four times a day  gabapentin 100 milliGRAM(s) Oral three times a day  magnesium sulfate  IVPB 1 Gram(s) IV Intermittent once  melatonin 3 milliGRAM(s) Oral at bedtime  pantoprazole    Tablet 40 milliGRAM(s) Oral before breakfast  sodium chloride 0.9%. 1000 milliLiter(s) (84 mL/Hr) IV Continuous <Continuous>    MEDICATIONS  (PRN):  acetaminophen   Tablet .. 325 milliGRAM(s) Oral every 4 hours PRN Mild Pain (1 - 3)  LORazepam     Tablet 0.5 milliGRAM(s) Oral three times a day PRN Anxiety  oxyCODONE    IR 10 milliGRAM(s) Oral every 4 hours PRN Severe Pain (7 - 10)

## 2019-04-26 NOTE — DISCHARGE NOTE PROVIDER - CARE PROVIDER_API CALL
Jasmine Young)  Neurology  [facility], [address] [place] [address]  Phone: [phone]  Fax: [phone]  Follow Up Time:     Zofia Munguia)  Neurosurgery  [address], [address] [place] [id number]  Phone: [phone]  Fax: [phone]  Follow Up Time:

## 2019-04-26 NOTE — DISCHARGE NOTE PROVIDER - CARE PROVIDERS DIRECT ADDRESSES
[email or web address]

## 2019-04-26 NOTE — PROGRESS NOTE ADULT - ASSESSMENT
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 CT head showed worsening edema compared to prior MRI brain. Exam significant for Brooks's sign in RUE and relative babinski on right. MRI brain showed increase size of enhancement compared to prior MRI brain. MRI cervical spine and T spine are unremarkable.    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    Plan:    [x] Surgery consulted for excisional biopsy of left supraclavicular lymph node, too small suggest doing under IR and/or after PET  [x] Neurosurgery for brain bx - scheduled today  [x] Medicine for clearance  [x] Holding off on steroids since asymptomatic  [x] Dr. Garcia (neuro-onc) on board - patient will follow outpatient with Dr. 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 s/p brain biopsy, tolerated well.    Plan:  [x] Dr. Lopez (neuro-onc) on board - patient will follow outpatient with Dr. 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 s/p brain biopsy, tolerated well.    Plan:  -f/u surgical pathology. If Dr. Lopez not available, patient should follow up with Dr. Jasmine Young, neuro-oncologist, as outpatient for surgical pathology results.  -pain management and incision site management post-biopsy per NSGY team  -plan for d/c today 4/26

## 2019-04-26 NOTE — DISCHARGE NOTE PROVIDER - NSDCCPCAREPLAN_GEN_ALL_CORE_FT
PRINCIPAL DISCHARGE DIAGNOSIS  Diagnosis: Headache  Assessment and Plan of Treatment: Sometimes you can have headaches after lumbar puncture. Your headache has now improved.      SECONDARY DISCHARGE DIAGNOSES  Diagnosis: Brain lesion  Assessment and Plan of Treatment: You underwent a brain biopsy. You will need to follow up with your neurosurgeon, Dr. Munguia, and also neuro-oncology, with Dr. Jasmine Young for the results of your biopsy.

## 2019-04-26 NOTE — PROGRESS NOTE ADULT - PROVIDER SPECIALTY LIST ADULT
Neurology
Neurosurgery
Neurology

## 2019-04-26 NOTE — PROGRESS NOTE ADULT - ASSESSMENT
29 yr old F h/o anxiety, ITP, childhood migraine presented a week ago with worsening HA, nausea for 2 days. Her HA became unbearable with few episodes of nausea, emesis, photophobia and HA which worsens with sitting or standing upright improves with laying flat. LP was unremarkable. platelets 196. CT C/A/P showed nonspecific subcentimeter retroperitoneal and supraclavicular lymph nodes. MRI on 4/19 showed increased size, enhancement and hyperintense T2 and FLAIR signal within the left posterior frontal and parietal periventricular white matter lesion compared to MRI on 4/14.     Patient underwent biopsy of intracranial lesion yesterday.    Plan:  - Continue management per primary team  - F/U pathology results  - Daily bacitracin over incision

## 2019-04-26 NOTE — DISCHARGE NOTE NURSING/CASE MANAGEMENT/SOCIAL WORK - NSDCDPATPORTLINK_GEN_ALL_CORE
You can access the [facility] Patient Portal, offered by [facility], by registering with the following website: [email or web address]

## 2019-04-26 NOTE — PROGRESS NOTE ADULT - ATTENDING COMMENTS
Examined the patient and agreed w above plan. Biopsy on Thursday.
No change in neuro status.  HA remains improved.  MRI brain and C/T spine reviewed, there are no cord lesions and left frontoparietal lesion is slightly larger with more edema and clear involvement of the splenium.  I am most suspicious of lymphoma.      Will call general surgery to do excisional biopsy of left supraclavicular lymph node, as this may give diagnosis without brain bx.  Cont PRN treatment of HA, NO steroids.  If path not diagnostic will need to proceed to brain bx.
she remains stable  still with mild right drift and a few beats of right ankle clonus  steady gait  appreciate nsgy- plan is for biopsy thurs
Patient seen and examined, HA improved today, no positional component.  She now has 3 beats ankle clonus and relative left babinski sign.      CT head appears to show more edema surrounding the left frontoparietal mass, and I am concerned that this is lymphoma, especially considering the change in neuro exam and the supraclavicular and retroperitoneal nodes on CT.      Will get MRI brain, C/T spine, if spinal lesions are present this is almost assuredly MS, if no spinal cord lesions, will likely try to get excisional biopsy of supraclavicular lymph node for diagnosis of ?lymphoma.
neurologically stable. no complaints  on exam alert, fluent, eomi, face symmteric. no drift today. ffm intact. HP and DF 5/5. normal tone in legs except for the few beats of clonus in the right ankle.    biopsy is planned for this afternoon  then plan is for discharge home off steroids when safe from post-op nsgy perspective and then will f/u with Dr. Lopez
she remains feeling well  no change in exam, minor right drift, right ankle few beats of clonus. no headache  biopsy is tomorrow  if she remains neurologically stable plan will be to discharge home after safe from post-op perspective and have f/u with Dr. Lopez for review of biopsy results. I d/w him today. If she continues to be asymptomatic then there is no clear role for empiric steroids while waiting for biopsy results, especially if biopsy ends up being nondiagnostic and she required another, would not want her to have received steroids that could prevent getting diagnosis unless there was a strong indication for using.
I performed a history and physical examination of the patient and discussed the management of the patient with the resident. I reviewed the resident's note and agree with the documented findings and plan of care with the following additions/exceptions.    DOS 4/22/19    she says her only symptoms now are that it is hard for her to find the proper word to say at times. otherwise denies weakness, numbness, tingling. no current headaches. denies any e/o vision loss or eye pain in the past.    on exam she was alert, fluent, no paraphasic errors, follows commands, gives her history. VFF on bedside testing. eomi, face symmetric vs minor flattening of the left NLF, no dysarthria, minor right drift. strength full.  reflexes 3+ biceps, brachioradialis, patellars b/l, 2+ achilles b/l. tone normal. clonus a few beats in the right ankle vs absent in the left. gait is steady, romberg neg.    mri was reviewed- the left frontoparietal mass with enhancement is bigger  from 4/14 to 4/19.   concerned about lymphoma.  appreciate Dr. Lopez' consult who thought MS was more likely.  pending brain biopsy- will d/w nsgy trying to expedite timing of this  need to consider if she should have steroids post-op while path is pending. if continues to have good neurologic exam may be able to be discharged while awaiting final path.
neurologically stable  alert, fluent  minor right arm drift  legs full strength  few beats clonus on the right ankle  incision site pain otherwise without complaints  stable for discharge and will have f/u for biopsy results. further treatment to be determined based on results
Patient seen and examined.  No new HA.  Today, however, she now has sustained ankle clonus on the right.  As she is clearly progressing clinically and the supraclavicular node is not big enough for excisional biopsy, will proceed directly to brain bx.  Will start steroids ONLY after brain bx done.  I continue to be most concerned that this is lymphoma, possibly systemic and NOT PCNSL, as she has severe subcentimeter lymph nodes in atypical areas in chest, abd/pelvis (retroperitoneal)

## 2019-04-28 ENCOUNTER — MOBILE ON CALL (OUTPATIENT)
Age: 29
End: 2019-04-28

## 2019-04-29 LAB
OLIGOCLONAL BANDS CSF ELPH-IMP: SIGNIFICANT CHANGE UP
OLIGOCLONAL BANDS CSF ELPH-IMP: SIGNIFICANT CHANGE UP

## 2019-04-30 ENCOUNTER — MOBILE ON CALL (OUTPATIENT)
Age: 29
End: 2019-04-30

## 2019-04-30 LAB — SURGICAL PATHOLOGY STUDY: SIGNIFICANT CHANGE UP

## 2019-05-01 LAB — IGG SYNTH RATE SER+CSF CALC-MRATE: SIGNIFICANT CHANGE UP MG/DAY

## 2019-05-03 ENCOUNTER — APPOINTMENT (OUTPATIENT)
Dept: NEUROSURGERY | Facility: CLINIC | Age: 29
End: 2019-05-03
Payer: COMMERCIAL

## 2019-05-03 VITALS
WEIGHT: 130 LBS | BODY MASS INDEX: 22.2 KG/M2 | SYSTOLIC BLOOD PRESSURE: 133 MMHG | HEART RATE: 83 BPM | HEIGHT: 64 IN | TEMPERATURE: 98.3 F | DIASTOLIC BLOOD PRESSURE: 85 MMHG | OXYGEN SATURATION: 98 % | RESPIRATION RATE: 16 BRPM

## 2019-05-03 DIAGNOSIS — Z86.69 PERSONAL HISTORY OF OTHER DISEASES OF THE NERVOUS SYSTEM AND SENSE ORGANS: ICD-10-CM

## 2019-05-03 DIAGNOSIS — Z78.9 OTHER SPECIFIED HEALTH STATUS: ICD-10-CM

## 2019-05-03 PROCEDURE — 99024 POSTOP FOLLOW-UP VISIT: CPT

## 2019-05-03 NOTE — HISTORY OF PRESENT ILLNESS
[FreeTextEntry1] : Vivienne Pérez is a pleasant right handed 29yearold white lady who  presented with some usual headache and vertigo (patient has a history of migraines), and brain MRI showed an enhancing lesion in the left deep parietal paraventricular region.  The deferential diagnosis was an inflammatory  lesion including multiple sclerosis verus lymphoma or other neoplasia including high grade glioma were discussed.  Lumbar puncture is not contributory.  We decided for needle biopsy of this lesion before further management.  \par \par Today she presents for post operative check 8 days after biopsy.  Her surgical incision is healing well without any evidence of redness, drainage and she denies fever.  She states that she has occasional headaches 5-6/10 not associated with other symptoms and controlled with ES Tylenol prn.  She states that post operatively she had some nausea but this has resolved.  She reports continued decreased right peripheral vision right > left.\par \par Patient states that she does not smoke cigarettes but smokes marijuana on a daily basis.\par

## 2019-05-03 NOTE — REVIEW OF SYSTEMS
[Negative] : Heme/Lymph [Anxiety] : anxiety [Depression] : depression [de-identified] : headache [FreeTextEntry3] : VISION CHANGES

## 2019-05-03 NOTE — PHYSICAL EXAM
[General Appearance - In No Acute Distress] : in no acute distress [General Appearance - Alert] : alert [General Appearance - Well Developed] : well developed [General Appearance - Well-Appearing] : healthy appearing [General Appearance - Well Nourished] : well nourished [Oriented To Time, Place, And Person] : oriented to person, place, and time [Impaired Insight] : insight and judgment were intact [Mood] : the mood was normal [Memory Recent] : recent memory was not impaired [Affect] : the affect was normal [Person] : oriented to person [Time] : oriented to time [Place] : oriented to place [Cranial Nerves Oculomotor (III)] : extraocular motion intact [Cranial Nerves Optic (II)] : visual acuity intact bilaterally,  pupils equal round and reactive to light [Cranial Nerves Trigeminal (V)] : facial sensation intact symmetrically [Cranial Nerves Glossopharyngeal (IX)] : tongue and palate midline [Cranial Nerves Facial (VII)] : face symmetrical [Cranial Nerves Vestibulocochlear (VIII)] : hearing was intact bilaterally [Cranial Nerves Accessory (XI - Cranial And Spinal)] : head turning and shoulder shrug symmetric [Cranial Nerves Hypoglossal (XII)] : there was no tongue deviation with protrusion [Motor Strength] : muscle strength was normal in all four extremities [Sclera] : the sclera and conjunctiva were normal [Extraocular Movements] : extraocular movements were intact [PERRL With Normal Accommodation] : pupils were equal in size, round, reactive to light, with normal accommodation [Outer Ear] : the ears and nose were normal in appearance [Hearing Threshold Finger Rub Not [place]] : hearing was normal [Neck Appearance] : the appearance of the neck was normal [Oropharynx] : the oropharynx was normal [Respiration, Rhythm And Depth] : normal respiratory rhythm and effort [Exaggerated Use Of Accessory Muscles For Inspiration] : no accessory muscle use [Abnormal Walk] : normal gait [Heart Rate And Rhythm] : heart rate was normal and rhythm regular [Involuntary Movements] : no involuntary movements were seen [Motor Tone] : muscle strength and tone were normal [Skin Color & Pigmentation] : normal skin color and pigmentation [] : no rash [Clean] : clean [Dry] : dry [Healing Well] : healing well [Sutures Intact] : closed with intact sutures [No Drainage] : without drainage [Erythema] : not erythematous [Tender] : not tender [Warm] : not warm [Fluctuant] : not fluctuant [FreeTextEntry1] : left parietal

## 2019-05-03 NOTE — ASSESSMENT
[FreeTextEntry1] : IMPRESSION:\par 1. Pt is recovering well s/p left parietal stereotactic Navigusguided biopsy.\par 2. Incision without any evidence of infection/redness/drainage.\par \par PLAN:\par 1. f/u in 4 weeks with Dr. Munguia 5/29/19 10 am.\par 2. Advised patient to wash hair with baby shampoo and dry thoroughly - pat dry.\par 3. advised patient to notify us of any fevers, redness/drainage from incision to us immediately.\par 4. Pt has appointment 5/13/19 to f/u with Dr. Lopez.\par 5. I advised patient not to drive for safety concerns.

## 2019-05-13 ENCOUNTER — APPOINTMENT (OUTPATIENT)
Dept: NEUROLOGY | Facility: CLINIC | Age: 29
End: 2019-05-13
Payer: COMMERCIAL

## 2019-05-13 ENCOUNTER — APPOINTMENT (OUTPATIENT)
Dept: MRI IMAGING | Facility: IMAGING CENTER | Age: 29
End: 2019-05-13
Payer: COMMERCIAL

## 2019-05-13 ENCOUNTER — OUTPATIENT (OUTPATIENT)
Dept: OUTPATIENT SERVICES | Facility: HOSPITAL | Age: 29
LOS: 1 days | End: 2019-05-13
Payer: COMMERCIAL

## 2019-05-13 DIAGNOSIS — Z90.49 ACQUIRED ABSENCE OF OTHER SPECIFIED PARTS OF DIGESTIVE TRACT: Chronic | ICD-10-CM

## 2019-05-13 DIAGNOSIS — G93.9 DISORDER OF BRAIN, UNSPECIFIED: ICD-10-CM

## 2019-05-13 DIAGNOSIS — Z01.419 ENCOUNTER FOR GYNECOLOGICAL EXAMINATION (GENERAL) (ROUTINE) WITHOUT ABNORMAL FINDINGS: ICD-10-CM

## 2019-05-13 PROCEDURE — A9585: CPT

## 2019-05-13 PROCEDURE — 70553 MRI BRAIN STEM W/O & W/DYE: CPT

## 2019-05-13 PROCEDURE — 70553 MRI BRAIN STEM W/O & W/DYE: CPT | Mod: 26

## 2019-05-13 PROCEDURE — 99215 OFFICE O/P EST HI 40 MIN: CPT

## 2019-05-13 NOTE — DISCUSSION/SUMMARY
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 I think earlier treatment may be reasonable, given the recent data that patietns who start disease modifying therapy within one year of diagnosis have significantly lower disability scores than those who start 3 years after diagnosis. I do not need to see her in follow-up, but would be delighted to see her on an as needed basis.

## 2019-05-13 NOTE — PHYSICAL EXAM
[General Appearance - Alert] : alert [General Appearance - In No Acute Distress] : in no acute distress [Oriented To Time, Place, And Person] : oriented to person, place, and time [Impaired Insight] : insight and judgment were intact [Affect] : the affect was normal [Person] : oriented to person [Place] : oriented to place [Time] : oriented to time [Concentration Intact] : normal concentrating ability [Visual Intact] : visual attention was ~T not ~L decreased [Repeating Phrases] : no difficulty repeating a phrase [Naming Objects] : no difficulty naming common objects [Fluency] : fluency intact [Writing A Sentence] : no difficulty writing a sentence [Comprehension] : comprehension intact [Reading] : reading intact [Past History] : adequate knowledge of personal past history [Cranial Nerves Optic (II)] : visual acuity intact bilaterally,  visual fields full to confrontation, pupils equal round and reactive to light [Cranial Nerves Oculomotor (III)] : extraocular motion intact [Cranial Nerves Trigeminal (V)] : facial sensation intact symmetrically [Cranial Nerves Vestibulocochlear (VIII)] : hearing was intact bilaterally [Cranial Nerves Facial (VII)] : face symmetrical [Cranial Nerves Hypoglossal (XII)] : there was no tongue deviation with protrusion [Cranial Nerves Accessory (XI - Cranial And Spinal)] : head turning and shoulder shrug symmetric [Cranial Nerves Glossopharyngeal (IX)] : tongue and palate midline [Motor Strength] : muscle strength was normal in all four extremities [No Muscle Atrophy] : normal bulk in all four extremities [Motor Tone] : muscle tone was normal in all four extremities [Paresis Pronator Drift Right-Sided] : no pronator drift on the right [Paresis Pronator Drift Left-Sided] : no pronator drift on the left [Motor Strength Upper Extremities Bilaterally] : strength was normal in both upper extremities [Sensation Tactile Decrease] : light touch was intact [Motor Strength Lower Extremities Bilaterally] : strength was normal in both lower extremities [Past-pointing] : there was no past-pointing [Balance] : balance was intact [Abnormal Walk] : normal gait [Tremor] : no tremor present [2+] : Ankle jerk left 2+ [Plantar Reflex Right Only] : normal on the right [Plantar Reflex Left Only] : normal on the left [FreeTextEntry5] : no field cut to confrontation.

## 2019-05-13 NOTE — DATA REVIEWED
[de-identified] : I personally reviewed MR imaging dated\par 5/13/19	4/19/19	4/14/19	\par \par In reviewing these images, I find INTERVAL REDUCTION in the three sites of hyperintensity on the T2 weighted images, with signal change likely representing an admixture of treatment effects, cerebral edema, and inflammation.  Abnormal regions are located in the left occipital horn white matter, right periventricular temporal lobe and far anterior right frontal pole at the U fibers.\par On the contrast enhanced images, there is enhancement around the largest of the lesions, located at the left occipital horn, as well as a punctate focus in the right anterior frontal lesion, which resolves over time.\par Overall I find the images to be improved. \par Selected imaging was provided to the patient, along with a detailed verbal explanation of the issues at hand as outlined above.\par  [de-identified] : HIV negative\par EVELIO virus PCR in CSF negative\par CSF WBC=1 RBC=0 protein=50\par paraneoplastic panel CSF negative

## 2019-05-13 NOTE — HISTORY OF PRESENT ILLNESS
[FreeTextEntry1] : 28 yo RH woman with brain lesions returns to see me to follow-up on biopsy results of brain lesions.\par \par Briefly, she developed a visual field cut, which was noticed because she was banging into things on the right. MR imaging disclosed three lesions, the largest one involving the deep white matter of the left temporoparietal region with robust enhancement, likely involving the temporal fibers of the optic tracts. There was also a smaller right frontal lesion with much less enhancement, and a deep right TEMPORAL LOBE lesion without any enhancement and periventricular in location. I saw her in the hospital and thought these lesions likely represented demyelinating disease like MS or ADEM. She was up to date on her vaccinations, so MS far more likely. She had no other symptoms, other than the field cut, so she was discharged following a lumbar puncture.\par \par She returned a few days later because she had a headache, manifested worst when sitting up and resolving when lying down. I thought she likely had a CSF hypovolemia headache. Anesthesia declined to perform a blood patch. A new MRI was performed during this readmission and demonstrated worsened disease. She underwent a biopsy of the left occipital horn lesion by Dr Munguia on 4/25/19. I saw her after the biopsy and she was improved from 4/14/19. \par \par Of interest, the CSF did not show oligoclonal bands, but a serum sample was unavailable to calculate CSF IgG synthesis. igG in CSF was 5.1 (upper limit of normal is 3.4).\par \par The brain lesional biopsy revealed loss of myelin and inflammation, c/w multiple sclerosis. It states, "Cortical brain tissue with relative increased reactive histiocytes (positive for  marker, LFB-PAS stain) in an area suggestive of a demyelinating process. GFAP shows a reactive glial process. No significant lymphocytes are noted."\par \par Today in the office, she feels well and has no complaints. She had a new brain MRI performed this AM.\par \par Both her parents accompany her and have not noticed anything out of the ordinary.\par \par She has never received steroids.

## 2019-05-21 ENCOUNTER — LABORATORY RESULT (OUTPATIENT)
Age: 29
End: 2019-05-21

## 2019-05-21 ENCOUNTER — APPOINTMENT (OUTPATIENT)
Dept: NEUROLOGY | Facility: CLINIC | Age: 29
End: 2019-05-21
Payer: COMMERCIAL

## 2019-05-21 VITALS
DIASTOLIC BLOOD PRESSURE: 85 MMHG | HEART RATE: 73 BPM | BODY MASS INDEX: 21.51 KG/M2 | SYSTOLIC BLOOD PRESSURE: 129 MMHG | WEIGHT: 126 LBS | HEIGHT: 64 IN

## 2019-05-21 LAB
CRP SERPL-MCNC: 0.22 MG/DL
ERYTHROCYTE [SEDIMENTATION RATE] IN BLOOD BY WESTERGREN METHOD: 4 MM/HR
TSH SERPL-ACNC: 1.09 UIU/ML

## 2019-05-21 PROCEDURE — 99215 OFFICE O/P EST HI 40 MIN: CPT

## 2019-05-22 LAB
25(OH)D3 SERPL-MCNC: 14.6 NG/ML
B BURGDOR IGG+IGM SER QL IB: NORMAL
C3 SERPL-MCNC: 53 MG/DL
C4 SERPL-MCNC: 18 MG/DL
HBV CORE IGG+IGM SER QL: NONREACTIVE
HBV CORE IGM SER QL: NONREACTIVE
HBV SURFACE AB SER QL: ABNORMAL
HBV SURFACE AB SERPL IA-ACNC: 8.9 MIU/ML
HBV SURFACE AG SER QL: NONREACTIVE
MPO AB + PR3 PNL SER: NORMAL
VZV AB TITR SER: POSITIVE
VZV IGG SER IF-ACNC: 2760 INDEX

## 2019-05-22 NOTE — HISTORY OF PRESENT ILLNESS
[FreeTextEntry1] : Reason for consult: MS\par \par HPI: DARIO HENRIQUEZ is a 29 year old woman \par \par 11yo - migraines, got MRI brain which was unremarkable.\par 4/2019 - bumping into things, thought to have R field cut but pt reports never having problems with vf to finger counting, referred to Dr. Glez. MRI brain revealed large enhancing L tempoparietal lesion, small R frontal amish+ lesion, and R temporal lesion that was also amish+.  CSF showed no OCBs, wbc 1, and prot 50, and no serum sample was taken to calculate igg index. Did not get IVMP and was dc'd, then returned with a postural HA that resolved, also had some vomiting.  Repeat brain MRI showed increased size of L parietal lesion which was biopsied on 4/25/19, with biopsy showing cortical brain tissue w inc reactive histiocytes, reactive gliosis, no significant lymphocytes, and demyelination - thought to be compatible with MS. \par Never received steroids, began to improve over weeks.\par 5/2019 - repeat brain MRI  showed interval decrease in size and enhancement.\par \par Pt still gets HAs intermittently. Occ "chills", sharp pain in the right side of her low back. Pt believes that visual field cut has also improved but difficult to tell. Occ difficulty coming up with words.\par \par ROS/Current Sx:\par occ difficulty with speech\par occ back pain.\par no bladder dysfunction. \par chronic constipation\par \par \par PMHX:\par gastroparesis - for almost 1 decade. chronically taking reglan, pantoprazole, ursodiol.\par ITP in childhood, currently not on rx and plts are wnl.\par \par MEDS:\par as above.\par xanax prn\par OCP\par \par ALL: sulfa\par \par SHx: no tob, rare etoh, +marijuana, no other drugs.\par \par FHx: mother with meningioma, father's uncle with MS. no AI. \par \par Vitals: unremarkable\par \par Exam:\par \par AO3.  Normally conversant.  Follows commands, names, and repeats.  Good attention.\par \par PERRL, VFF, EOMI, no nystagmus, face symmetric, TUP at midline.\par \par Motor: \par                                                 R:                               L:\par Del                                           5                                5\par Bi                                              5                               5\par Tri                                            5                               5\par Wrist Extensors                      5                               5\par Finger abductors                    5                               5\par                                         5                               5 \par \par HF                                           5                               5\par KE                                           5                               5\par KF                                           5                               5\par DF                                           5                               5\par PF                                           5                               5\par \par Tone                                       R                               L\par UE                                          0                                0 \par LE                                          0                                0\par \par Sensory                                RUE                      LUE                 RLE                LLE     \par LT                                           +                            +                      +                   +\par Vib                                          +                            +                      +                   +\par JPS                                         +                            +                      +                   +\par PP                                         +                            +                      +                   +\par Temp                                     +                            +                      +                   +\par \par Reflexes:\par                                              R                             L                            \par Biceps                                  2                             2\par BR                                        2                             2\par Triceps                                2                              2\par Pat                                        3                            3\par AJ                                        3                             3\par \par TOES                                    F                            F\par \par Coordination:\par                                              R                             L                       \par FTN                                       0                             0 \par ANA                                      0                            0\par HTS                                      0                             0 \par \par Other                                                                          \par  \par Gait: normal, can heel, toe, and tandem walk\par \par                     Assistance: none\par \par autoimmune ab panel neg\par \par CSF 4/2019\par 0 OCBs\par no igg index could be done.\par 1 WBCs\par vdrl neg\par ace wnl\par crypto neg\par \par MRI brain 4/14/19 - large L parietal lesion with incomplete patchy enhancement. 1 small R frontal lesion and 1 medium sized R temporal lesion both of which are subtly enhancing. i don't appreciate any amish- lesions.\par MRI brain 4/19/19 - mild worsening in edema surrounding L parietal lesion.\par MRI brain 5/2019 - improvement in large L parietal lesion, likely new small advidly amish+ L frontal lesion anterior to the large tumefactive lesion.\par \par AP: 28yo w/ brain lesions and biopsy suggestive of tumefactive MS. Biopsy did not find e/o tumor, and there was no e/o infection on CSF sampling, without pleocytosis. While she meets DIS (PV and EVELIO lesions), she did not technically meet DIT on initial imaging as all lesions were amish+. While there was a new lesion on 5/2019 MRI c/w 4/2019, it is still so close to the initial episode that I would consider it part of the same episode, and I would not consider it sufficient evidence of DIT to recommend indefinite DMT at this time. I discussed the rationale for this with the patient and family and they voiced comprehension. I recommended a course of IVMP and pred taper, followed by a repeat MRI 2 months from now.\par \par - 5d IVMP, pred taper.\par - close monitoring with MRIs, next in 7/2019. If new lesions on that MRI, will recommend DMT.\par - will discuss imaging with neuroradiology ****\par - RTC after new MRI\par \par \par \par \par

## 2019-05-23 LAB
ANA SER IF-ACNC: NEGATIVE
DSDNA AB SER-ACNC: <12 IU/ML
ENA SS-A AB SER IA-ACNC: <0.2 AL
ENA SS-B AB SER IA-ACNC: <0.2 AL

## 2019-05-24 ENCOUNTER — APPOINTMENT (OUTPATIENT)
Dept: NEUROLOGY | Facility: CLINIC | Age: 29
End: 2019-05-24
Payer: COMMERCIAL

## 2019-05-24 ENCOUNTER — TRANSCRIPTION ENCOUNTER (OUTPATIENT)
Age: 29
End: 2019-05-24

## 2019-05-24 PROCEDURE — 96365 THER/PROPH/DIAG IV INF INIT: CPT

## 2019-05-28 ENCOUNTER — APPOINTMENT (OUTPATIENT)
Dept: NEUROLOGY | Facility: CLINIC | Age: 29
End: 2019-05-28
Payer: COMMERCIAL

## 2019-05-28 VITALS — SYSTOLIC BLOOD PRESSURE: 128 MMHG | HEART RATE: 74 BPM | RESPIRATION RATE: 16 BRPM | DIASTOLIC BLOOD PRESSURE: 86 MMHG

## 2019-05-28 LAB
JCV INDEX: 0.21
M TB IFN-G BLD-IMP: NEGATIVE
QUANTIFERON TB PLUS MITOGEN MINUS NIL: 8.79 IU/ML
QUANTIFERON TB PLUS NIL: 0.03 IU/ML
QUANTIFERON TB PLUS TB1 MINUS NIL: 0 IU/ML
QUANTIFERON TB PLUS TB2 MINUS NIL: 0 IU/ML
STRATIFY JCV ANTIBODY: ABNORMAL

## 2019-05-28 PROCEDURE — S0028: CPT

## 2019-05-28 PROCEDURE — 96365 THER/PROPH/DIAG IV INF INIT: CPT

## 2019-05-28 RX ORDER — FAMOTIDINE 10 MG/ML
20 INJECTION, SOLUTION INTRAVENOUS
Refills: 0 | Status: COMPLETED | OUTPATIENT
Start: 2019-05-28

## 2019-05-28 RX ORDER — METHYLPREDNISOLONE SODIUM SUCCINATE 500 MG/ML
500 INJECTION, POWDER, FOR SOLUTION INTRAMUSCULAR; INTRAVENOUS
Refills: 0 | Status: COMPLETED | OUTPATIENT
Start: 2019-05-28

## 2019-05-28 RX ADMIN — Medication 0 MG/2ML: at 00:00

## 2019-05-28 RX ADMIN — METHYLPREDNISOLONE SODIUM SUCCINATE 0 MG: 500 INJECTION, POWDER, FOR SOLUTION INTRAMUSCULAR; INTRAVENOUS at 00:00

## 2019-05-29 ENCOUNTER — APPOINTMENT (OUTPATIENT)
Dept: NEUROLOGY | Facility: CLINIC | Age: 29
End: 2019-05-29
Payer: COMMERCIAL

## 2019-05-29 PROCEDURE — 96365 THER/PROPH/DIAG IV INF INIT: CPT

## 2019-05-30 ENCOUNTER — APPOINTMENT (OUTPATIENT)
Dept: NEUROLOGY | Facility: CLINIC | Age: 29
End: 2019-05-30
Payer: COMMERCIAL

## 2019-05-30 VITALS — SYSTOLIC BLOOD PRESSURE: 150 MMHG | HEART RATE: 100 BPM | RESPIRATION RATE: 16 BRPM | DIASTOLIC BLOOD PRESSURE: 76 MMHG

## 2019-05-30 PROCEDURE — S0028: CPT

## 2019-05-30 PROCEDURE — 96365 THER/PROPH/DIAG IV INF INIT: CPT

## 2019-05-30 PROCEDURE — 96366 THER/PROPH/DIAG IV INF ADDON: CPT

## 2019-05-30 RX ORDER — METHYLPREDNISOLONE SODIUM SUCCINATE 500 MG/ML
500 INJECTION, POWDER, FOR SOLUTION INTRAMUSCULAR; INTRAVENOUS
Refills: 0 | Status: COMPLETED | OUTPATIENT
Start: 2019-05-30

## 2019-05-30 RX ORDER — FAMOTIDINE 10 MG/ML
20 INJECTION, SOLUTION INTRAVENOUS
Refills: 0 | Status: COMPLETED | OUTPATIENT
Start: 2019-05-30

## 2019-05-30 RX ADMIN — CIMETIDINE 0 MG/2ML: 200 TABLET, FILM COATED ORAL at 00:00

## 2019-05-30 RX ADMIN — METHYLPREDNISOLONE SODIUM SUCCINATE 0 MG: 500 INJECTION, POWDER, FOR SOLUTION INTRAMUSCULAR; INTRAVENOUS at 00:00

## 2019-05-31 ENCOUNTER — APPOINTMENT (OUTPATIENT)
Dept: NEUROLOGY | Facility: CLINIC | Age: 29
End: 2019-05-31
Payer: COMMERCIAL

## 2019-05-31 ENCOUNTER — APPOINTMENT (OUTPATIENT)
Dept: NEUROSURGERY | Facility: CLINIC | Age: 29
End: 2019-05-31
Payer: COMMERCIAL

## 2019-05-31 VITALS
RESPIRATION RATE: 16 BRPM | HEIGHT: 64 IN | DIASTOLIC BLOOD PRESSURE: 94 MMHG | TEMPERATURE: 98.5 F | WEIGHT: 130 LBS | BODY MASS INDEX: 22.2 KG/M2 | HEART RATE: 76 BPM | OXYGEN SATURATION: 98 % | SYSTOLIC BLOOD PRESSURE: 129 MMHG

## 2019-05-31 DIAGNOSIS — G35 MULTIPLE SCLEROSIS: ICD-10-CM

## 2019-05-31 PROCEDURE — 96365 THER/PROPH/DIAG IV INF INIT: CPT

## 2019-05-31 PROCEDURE — S0028: CPT

## 2019-05-31 PROCEDURE — 99024 POSTOP FOLLOW-UP VISIT: CPT

## 2019-05-31 RX ORDER — TRAMADOL HYDROCHLORIDE 50 MG/1
50 TABLET, COATED ORAL
Qty: 60 | Refills: 0 | Status: DISCONTINUED | COMMUNITY
Start: 2019-05-02 | End: 2019-05-31

## 2019-05-31 NOTE — HISTORY OF PRESENT ILLNESS
[FreeTextEntry1] : Vivienne Pérez is a pleasant right handed 29 year old white lady who presented with some usual headache and vertigo (patient has a history of migraines), and brain MRI showed an enhancing lesion in the left deep parietal paraventricular region. The deferential diagnosis was an inflammatory lesion including multiple sclerosis verus lymphoma or other neoplasia including high grade glioma were discussed. Lumbar puncture is not contributory. We decided for needle biopsy of this lesion before further management. \par \par Today she presents for post operative check 5 weeks 1 day after biopsy. Her surgical incision is healing well without any evidence of redness, drainage and she denies fever. She denies headaches  She has started infusion therapy on 5/26/19 and states that today is her final day.  She has plans to f/u with Dr. Oakley MS Specialist.  States that she feels about 90 percent improved since she started experiencing headaches and vertigo in April 2019.\par \par Patient states that she does not smoke cigarettes but smokes marijuana on a daily basis.\par \par

## 2019-05-31 NOTE — PHYSICAL EXAM
[General Appearance - Alert] : alert [General Appearance - In No Acute Distress] : in no acute distress [General Appearance - Well Nourished] : well nourished [General Appearance - Well Developed] : well developed [General Appearance - Well-Appearing] : healthy appearing [Oriented To Time, Place, And Person] : oriented to person, place, and time [Impaired Insight] : insight and judgment were intact [Affect] : the affect was normal [Mood] : the mood was normal [Memory Recent] : recent memory was not impaired [Person] : oriented to person [Place] : oriented to place [Time] : oriented to time [Cranial Nerves Optic (II)] : visual acuity intact bilaterally,  pupils equal round and reactive to light [Cranial Nerves Oculomotor (III)] : extraocular motion intact [Cranial Nerves Trigeminal (V)] : facial sensation intact symmetrically [Cranial Nerves Facial (VII)] : face symmetrical [Cranial Nerves Vestibulocochlear (VIII)] : hearing was intact bilaterally [Cranial Nerves Glossopharyngeal (IX)] : tongue and palate midline [Cranial Nerves Accessory (XI - Cranial And Spinal)] : head turning and shoulder shrug symmetric [Cranial Nerves Hypoglossal (XII)] : there was no tongue deviation with protrusion [] : no respiratory distress [Respiration, Rhythm And Depth] : normal respiratory rhythm and effort [Exaggerated Use Of Accessory Muscles For Inspiration] : no accessory muscle use [Heart Rate And Rhythm] : heart rate was normal and rhythm regular

## 2019-05-31 NOTE — ASSESSMENT
[FreeTextEntry1] : IMPRESSION:\par 1. Pt is neurologically intact.\par \par \par PLAN:\par 1. F/U with MS specialist as scheduled.\par 2. States that she has a f/u brain MRI in mid June or July.\par 3. F/U prn.

## 2019-06-05 LAB
CULTURE RESULTS: SIGNIFICANT CHANGE UP
MISCELLANEOUS TEST: NORMAL
PROC NAME: NORMAL
SPECIMEN SOURCE: SIGNIFICANT CHANGE UP

## 2019-07-08 ENCOUNTER — FORM ENCOUNTER (OUTPATIENT)
Age: 29
End: 2019-07-08

## 2019-07-09 ENCOUNTER — OUTPATIENT (OUTPATIENT)
Dept: OUTPATIENT SERVICES | Facility: HOSPITAL | Age: 29
LOS: 1 days | End: 2019-07-09
Payer: COMMERCIAL

## 2019-07-09 ENCOUNTER — APPOINTMENT (OUTPATIENT)
Dept: MRI IMAGING | Facility: CLINIC | Age: 29
End: 2019-07-09
Payer: COMMERCIAL

## 2019-07-09 DIAGNOSIS — Z00.8 ENCOUNTER FOR OTHER GENERAL EXAMINATION: ICD-10-CM

## 2019-07-09 DIAGNOSIS — G35 MULTIPLE SCLEROSIS: ICD-10-CM

## 2019-07-09 DIAGNOSIS — Z90.49 ACQUIRED ABSENCE OF OTHER SPECIFIED PARTS OF DIGESTIVE TRACT: Chronic | ICD-10-CM

## 2019-07-09 PROCEDURE — 70553 MRI BRAIN STEM W/O & W/DYE: CPT | Mod: 26

## 2019-07-09 PROCEDURE — 70553 MRI BRAIN STEM W/O & W/DYE: CPT

## 2019-07-09 PROCEDURE — A9585: CPT

## 2019-07-10 PROCEDURE — 88108 CYTOPATH CONCENTRATE TECH: CPT

## 2019-07-10 PROCEDURE — 72156 MRI NECK SPINE W/O & W/DYE: CPT

## 2019-07-10 PROCEDURE — 85652 RBC SED RATE AUTOMATED: CPT

## 2019-07-10 PROCEDURE — 85730 THROMBOPLASTIN TIME PARTIAL: CPT

## 2019-07-10 PROCEDURE — 87205 SMEAR GRAM STAIN: CPT

## 2019-07-10 PROCEDURE — A9585: CPT

## 2019-07-10 PROCEDURE — 86341 ISLET CELL ANTIBODY: CPT

## 2019-07-10 PROCEDURE — 88184 FLOWCYTOMETRY/ TC 1 MARKER: CPT

## 2019-07-10 PROCEDURE — 84157 ASSAY OF PROTEIN OTHER: CPT

## 2019-07-10 PROCEDURE — 86850 RBC ANTIBODY SCREEN: CPT

## 2019-07-10 PROCEDURE — 86703 HIV-1/HIV-2 1 RESULT ANTBDY: CPT

## 2019-07-10 PROCEDURE — 70553 MRI BRAIN STEM W/O & W/DYE: CPT

## 2019-07-10 PROCEDURE — 85027 COMPLETE CBC AUTOMATED: CPT

## 2019-07-10 PROCEDURE — 70450 CT HEAD/BRAIN W/O DYE: CPT

## 2019-07-10 PROCEDURE — 81025 URINE PREGNANCY TEST: CPT

## 2019-07-10 PROCEDURE — 84443 ASSAY THYROID STIM HORMONE: CPT

## 2019-07-10 PROCEDURE — 84436 ASSAY OF TOTAL THYROXINE: CPT

## 2019-07-10 PROCEDURE — 87799 DETECT AGENT NOS DNA QUANT: CPT

## 2019-07-10 PROCEDURE — 88307 TISSUE EXAM BY PATHOLOGIST: CPT

## 2019-07-10 PROCEDURE — 86901 BLOOD TYPING SEROLOGIC RH(D): CPT

## 2019-07-10 PROCEDURE — 87476 LYME DIS DNA AMP PROBE: CPT

## 2019-07-10 PROCEDURE — 83735 ASSAY OF MAGNESIUM: CPT

## 2019-07-10 PROCEDURE — 83519 RIA NONANTIBODY: CPT

## 2019-07-10 PROCEDURE — 84702 CHORIONIC GONADOTROPIN TEST: CPT

## 2019-07-10 PROCEDURE — C1713: CPT

## 2019-07-10 PROCEDURE — 83615 LACTATE (LD) (LDH) ENZYME: CPT

## 2019-07-10 PROCEDURE — 71260 CT THORAX DX C+: CPT

## 2019-07-10 PROCEDURE — 99285 EMERGENCY DEPT VISIT HI MDM: CPT | Mod: 25

## 2019-07-10 PROCEDURE — 88313 SPECIAL STAINS GROUP 2: CPT

## 2019-07-10 PROCEDURE — 86900 BLOOD TYPING SEROLOGIC ABO: CPT

## 2019-07-10 PROCEDURE — 89051 BODY FLUID CELL COUNT: CPT

## 2019-07-10 PROCEDURE — 85610 PROTHROMBIN TIME: CPT

## 2019-07-10 PROCEDURE — 72157 MRI CHEST SPINE W/O & W/DYE: CPT

## 2019-07-10 PROCEDURE — 87496 CYTOMEG DNA AMP PROBE: CPT

## 2019-07-10 PROCEDURE — 82607 VITAMIN B-12: CPT

## 2019-07-10 PROCEDURE — 88333 PATH CONSLTJ SURG CYTO XM 1: CPT

## 2019-07-10 PROCEDURE — 87483 CNS DNA AMP PROBE TYPE 12-25: CPT

## 2019-07-10 PROCEDURE — 80053 COMPREHEN METABOLIC PANEL: CPT

## 2019-07-10 PROCEDURE — 86403 PARTICLE AGGLUT ANTBDY SCRN: CPT

## 2019-07-10 PROCEDURE — 74177 CT ABD & PELVIS W/CONTRAST: CPT

## 2019-07-10 PROCEDURE — 80048 BASIC METABOLIC PNL TOTAL CA: CPT

## 2019-07-10 PROCEDURE — 88331 PATH CONSLTJ SURG 1 BLK 1SPC: CPT

## 2019-07-10 PROCEDURE — 87116 MYCOBACTERIA CULTURE: CPT

## 2019-07-10 PROCEDURE — 88185 FLOWCYTOMETRY/TC ADD-ON: CPT

## 2019-07-10 PROCEDURE — 81003 URINALYSIS AUTO W/O SCOPE: CPT

## 2019-07-10 PROCEDURE — 83916 OLIGOCLONAL BANDS: CPT

## 2019-07-10 PROCEDURE — 86592 SYPHILIS TEST NON-TREP QUAL: CPT

## 2019-07-10 PROCEDURE — 93005 ELECTROCARDIOGRAM TRACING: CPT

## 2019-07-10 PROCEDURE — 86788 WEST NILE VIRUS AB IGM: CPT

## 2019-07-10 PROCEDURE — 99285 EMERGENCY DEPT VISIT HI MDM: CPT

## 2019-07-10 PROCEDURE — 84480 ASSAY TRIIODOTHYRONINE (T3): CPT

## 2019-07-10 PROCEDURE — 88341 IMHCHEM/IMCYTCHM EA ADD ANTB: CPT

## 2019-07-10 PROCEDURE — 86789 WEST NILE VIRUS ANTIBODY: CPT

## 2019-07-10 PROCEDURE — C1889: CPT

## 2019-07-10 PROCEDURE — 84166 PROTEIN E-PHORESIS/URINE/CSF: CPT

## 2019-07-10 PROCEDURE — 88360 TUMOR IMMUNOHISTOCHEM/MANUAL: CPT

## 2019-07-10 PROCEDURE — 86255 FLUORESCENT ANTIBODY SCREEN: CPT

## 2019-07-10 PROCEDURE — 82945 GLUCOSE OTHER FLUID: CPT

## 2019-07-10 PROCEDURE — 96374 THER/PROPH/DIAG INJ IV PUSH: CPT

## 2019-07-10 PROCEDURE — 87070 CULTURE OTHR SPECIMN AEROBIC: CPT

## 2019-07-10 PROCEDURE — 87529 HSV DNA AMP PROBE: CPT

## 2019-07-10 PROCEDURE — 82164 ANGIOTENSIN I ENZYME TEST: CPT

## 2019-07-10 PROCEDURE — 71046 X-RAY EXAM CHEST 2 VIEWS: CPT

## 2019-07-10 PROCEDURE — 84100 ASSAY OF PHOSPHORUS: CPT

## 2019-07-10 PROCEDURE — 83873 ASSAY OF CSF PROTEIN: CPT

## 2019-07-11 ENCOUNTER — INBOUND DOCUMENT (OUTPATIENT)
Age: 29
End: 2019-07-11

## 2019-07-31 ENCOUNTER — APPOINTMENT (OUTPATIENT)
Dept: NEUROLOGY | Facility: CLINIC | Age: 29
End: 2019-07-31
Payer: COMMERCIAL

## 2019-07-31 VITALS
SYSTOLIC BLOOD PRESSURE: 131 MMHG | DIASTOLIC BLOOD PRESSURE: 86 MMHG | HEIGHT: 64 IN | WEIGHT: 125 LBS | BODY MASS INDEX: 21.34 KG/M2 | HEART RATE: 80 BPM

## 2019-07-31 PROCEDURE — 99213 OFFICE O/P EST LOW 20 MIN: CPT

## 2019-07-31 PROCEDURE — 99354: CPT

## 2019-07-31 NOTE — HISTORY OF PRESENT ILLNESS
[FreeTextEntry1] : Initial hx 5/2019\par 13yo - migraines, got MRI brain which was unremarkable.\par 4/2019 - bumping into things, thought to have R field cut but pt reports never having problems with vf to finger counting, referred to Dr. Glez. MRI brain revealed large enhancing L tempoparietal lesion, small R frontal amish+ lesion, and R temporal lesion that was also amish+. CSF showed no OCBs, wbc 1, and prot 50, and no serum sample was taken to calculate igg index. Did not get IVMP and was dc'd, then returned with a postural HA that resolved, also had some vomiting. Repeat brain MRI showed increased size of L parietal lesion which was biopsied on 4/25/19, with biopsy showing cortical brain tissue w inc reactive histiocytes, reactive gliosis, no significant lymphocytes, and demyelination - thought to be compatible with MS. \par Never received steroids, began to improve over weeks.\par 5/2019 - repeat brain MRI showed interval decrease in size and enhancement.\par Pt still gets HAs intermittently. Occ "chills", sharp pain in the right side of her low back. Pt believes that visual field cut has also improved but difficult to tell. Occ difficulty coming up with words.\par 5/2019 - got IVMP and didn't note much change in sx but no worsening.\par 7/2019 - new brain mri showed amish+ R cerebellar peduncle lesion, recommended to start dmt - opted for DMF.\par \par S:\par \par R sided justine-horses, 2-3x.\par \par ROS/Current Sx:\par occ difficulty with speech\par occ back pain.\par no bladder dysfunction. \par chronic constipation\par \par \par PMHX:\par gastroparesis - for almost 1 decade. pantoprazole, ursodiol.\par ITP in childhood, currently not on rx and plts are wnl.\par \par MEDS:\par as above.\par xanax prn\par OCP\par \par Exam:\par \par AO3. Normally conversant. Follows commands, names, and repeats. Good attention.\par \par PERRL, VFF, EOMI, no nystagmus, face symmetric, TUP at midline.\par \par Motor: \par    R:  L:\par Del   5  5\par Bi   5  5\par Tri   5  5\par Wrist Extensors  5  5\par Finger abductors  5  5\par    5  5 \par \par HF   5  5\par KE   5  5\par KF   5  5\par DF   5  5\par PF   5  5\par \par Tone   R  L\par UE   0  0 \par LE   0  0\par \par Sensory  RUE  LUE  RLE LLE \par LT   +  +  +  +\par Vib   +  +  +  +\par JPS   +  +  +  +\par PP   +  +  +  +\par Temp   +  +  +  +\par \par Reflexes:\par    R  L  \par Biceps   2  2\par BR   2  2\par Triceps  2  2\par Pat   3  3\par AJ   3  3\par \par TOES   F  F\par \par Coordination:\par    R  L  \par FTN   0  0 \par ANA   0  0\par HTS   0  0 \par \par Other     \par  \par Gait: normal, can heel, toe, and tandem walk\par \par   Assistance: none\par \par autoimmune ab panel neg\par \par CSF 4/2019\par 0 OCBs\par no igg index could be done.\par 1 WBCs\par vdrl neg\par ace wnl\par crypto neg\par \par MRI brain 4/14/19 - large L parietal lesion with incomplete patchy enhancement. 1 small R frontal lesion and 1 medium sized R temporal lesion both of which are subtly enhancing. i don't appreciate any amish- lesions.\par MRI brain 4/19/19 - mild worsening in edema surrounding L parietal lesion.\par MRI brain 5/2019 - improvement in large L parietal lesion, likely new small advidly amish+ L frontal lesion anterior to the large tumefactive lesion.\par MRI brain 7/2019 - resolution of prior enhancement, new amish+ R brachium pontis lesion. \par \par AP: 30yo w/ brain lesions and biopsy suggestive of tumefactive MS. Biopsy did not find e/o tumor, and there was no e/o infection on CSF sampling, without pleocytosis. Met DIS (PV and EVELIO lesions, then R cerebellar peduncle lesion), DIT on f/u imaging with new maish+ R cerebellar peduncle lesion. \par \par - discussed risk/benefit profile of tysabri, tecfidera, and ocrevus, including risk of pml with each. she opts to start tecfidera.\par - vitamin D3 5ku daily\par - RTC 1m after starting DMT.\par

## 2019-08-28 ENCOUNTER — APPOINTMENT (OUTPATIENT)
Dept: MRI IMAGING | Facility: HOSPITAL | Age: 29
End: 2019-08-28
Payer: COMMERCIAL

## 2019-08-28 ENCOUNTER — OUTPATIENT (OUTPATIENT)
Dept: OUTPATIENT SERVICES | Facility: HOSPITAL | Age: 29
LOS: 1 days | End: 2019-08-28
Payer: COMMERCIAL

## 2019-08-28 DIAGNOSIS — R51 HEADACHE: ICD-10-CM

## 2019-08-28 DIAGNOSIS — H53.47 HETERONYMOUS BILATERAL FIELD DEFECTS: ICD-10-CM

## 2019-08-28 DIAGNOSIS — G35 MULTIPLE SCLEROSIS: ICD-10-CM

## 2019-08-28 DIAGNOSIS — Z90.49 ACQUIRED ABSENCE OF OTHER SPECIFIED PARTS OF DIGESTIVE TRACT: Chronic | ICD-10-CM

## 2019-08-28 PROCEDURE — 70553 MRI BRAIN STEM W/O & W/DYE: CPT

## 2019-08-28 PROCEDURE — 70553 MRI BRAIN STEM W/O & W/DYE: CPT | Mod: 26

## 2019-08-28 PROCEDURE — A9579: CPT

## 2019-09-24 ENCOUNTER — APPOINTMENT (OUTPATIENT)
Dept: PAIN MANAGEMENT | Facility: CLINIC | Age: 29
End: 2019-09-24

## 2019-09-25 ENCOUNTER — APPOINTMENT (OUTPATIENT)
Dept: NEUROLOGY | Facility: CLINIC | Age: 29
End: 2019-09-25
Payer: COMMERCIAL

## 2019-09-25 ENCOUNTER — LABORATORY RESULT (OUTPATIENT)
Age: 29
End: 2019-09-25

## 2019-09-25 VITALS
SYSTOLIC BLOOD PRESSURE: 128 MMHG | BODY MASS INDEX: 21.17 KG/M2 | HEART RATE: 87 BPM | WEIGHT: 124 LBS | DIASTOLIC BLOOD PRESSURE: 87 MMHG | HEIGHT: 64 IN

## 2019-09-25 LAB
ALBUMIN SERPL ELPH-MCNC: 5 G/DL
ALP BLD-CCNC: 50 U/L
ALT SERPL-CCNC: 11 U/L
AST SERPL-CCNC: 15 U/L
BASOPHILS # BLD AUTO: 0.03 K/UL
BASOPHILS NFR BLD AUTO: 0.4 %
BILIRUB DIRECT SERPL-MCNC: 0.1 MG/DL
BILIRUB INDIRECT SERPL-MCNC: 0.4 MG/DL
BILIRUB SERPL-MCNC: 0.5 MG/DL
EOSINOPHIL # BLD AUTO: 0.43 K/UL
EOSINOPHIL NFR BLD AUTO: 6.2 %
HCT VFR BLD CALC: 43.3 %
HGB BLD-MCNC: 13.6 G/DL
IMM GRANULOCYTES NFR BLD AUTO: 0.1 %
LYMPHOCYTES # BLD AUTO: 1.83 K/UL
LYMPHOCYTES NFR BLD AUTO: 26.3 %
MAN DIFF?: NORMAL
MCHC RBC-ENTMCNC: 27.9 PG
MCHC RBC-ENTMCNC: 31.4 GM/DL
MCV RBC AUTO: 88.7 FL
MONOCYTES # BLD AUTO: 0.6 K/UL
MONOCYTES NFR BLD AUTO: 8.6 %
NEUTROPHILS # BLD AUTO: 4.07 K/UL
NEUTROPHILS NFR BLD AUTO: 58.4 %
PLATELET # BLD AUTO: 231 K/UL
PROT SERPL-MCNC: 7.2 G/DL
RBC # BLD: 4.88 M/UL
RBC # FLD: 13.9 %
WBC # FLD AUTO: 6.97 K/UL

## 2019-09-25 PROCEDURE — 99214 OFFICE O/P EST MOD 30 MIN: CPT

## 2019-09-25 NOTE — HISTORY OF PRESENT ILLNESS
[FreeTextEntry1] : Initial hx 5/2019\par 13yo - migraines, got MRI brain which was unremarkable.\par 4/2019 - bumping into things, thought to have R field cut but pt reports never having problems with vf to finger counting, referred to Dr. Glez. MRI brain revealed large enhancing L tempoparietal lesion, small R frontal amish+ lesion, and R temporal lesion that was also amish+. CSF showed no OCBs, wbc 1, and prot 50, and no serum sample was taken to calculate igg index. Did not get IVMP and was dc'd, then returned with a postural HA that resolved, also had some vomiting. Repeat brain MRI showed increased size of L parietal lesion which was biopsied on 4/25/19, with biopsy showing cortical brain tissue w inc reactive histiocytes, reactive gliosis, no significant lymphocytes, and demyelination - thought to be compatible with MS. \par Never received steroids, began to improve over weeks.\par 5/2019 - repeat brain MRI showed interval decrease in size and enhancement.\par Pt still gets HAs intermittently. Occ "chills", sharp pain in the right side of her low back. Pt believes that visual field cut has also improved but difficult to tell. Occ difficulty coming up with words.\par 5/2019 - got IVMP and didn't note much change in sx but no worsening.\par 7/2019 - new brain mri showed amish+ R cerebellar peduncle lesion, recommended to start dmt - opted for DMF.\par \par S:\par \par More severe headaches.\par \par started DMF 8/9/19. frequent bloating that interferes with her day to day, has become more prominent, no diarrhea or n/v. no flushing.\par \par Occ R sided justine-horses.\par \par Recently started a new job.\par \par ROS/Current Sx:\par occ difficulty with speech\par occ back pain.\par no bladder dysfunction. \par chronic constipation\par \par \par PMHX:\par gastroparesis - for almost 1 decade. pantoprazole, ursodiol.\par ITP in childhood, currently not on rx and plts are wnl.\par \par MEDS:\par as above.\par xanax prn\par OCP\par \par Exam:\par \par AO3. Normally conversant. Follows commands, names, and repeats. Good attention.\par \par PERRL, VFF, EOMI, no nystagmus, face symmetric, TUP at midline.\par \par Motor: \par  R: L:\par Del 5 5\par Bi 5 5\par Tri 5 5\par Wrist Extensors 5 5\par Finger abductors 5 5\par  5 5 \par \par HF 5 5\par KE 5 5\par KF 5 5\par DF 5 5\par PF 5 5\par \par Tone R L\par UE 0 0 \par LE 0 0\par \par Sensory RUE LUE RLE LLE \par LT + + + +\par Vib + + + +\par JPS + + + +\par PP + + + +\par Temp + + + +\par \par Reflexes:\par  R L \par Biceps 2 2\par BR 2 2\par Triceps 2 2\par Pat 3 3\par AJ 3 3\par \par TOES F F\par \par Coordination:\par  R L \par FTN 0 0 \par ANA 0 0\par HTS 0 0 \par \par Other \par  \par Gait: normal, can heel, toe, and tandem walk\par \par  Assistance: none\par \par autoimmune ab panel neg\par \par CSF 4/2019\par 0 OCBs\par no igg index could be done.\par 1 WBCs\par vdrl neg\par ace wnl\par crypto neg\par \par MRI brain 4/14/19 - large L parietal lesion with incomplete patchy enhancement. 1 small R frontal lesion and 1 medium sized R temporal lesion both of which are subtly enhancing. i don't appreciate any amish- lesions.\par MRI brain 4/19/19 - mild worsening in edema surrounding L parietal lesion.\par MRI brain 5/2019 - improvement in large L parietal lesion, likely new small advidly amish+ L frontal lesion anterior to the large tumefactive lesion.\par MRI brain 7/2019 - resolution of prior enhancement, new amish+ R brachium pontis lesion. \par MRI brain 8/2019 - no new lesions. R brachium pontis lesion is bigger than prior but with only minimal enhancement that was improved from prior.\par \par \par AP: 30yo w/ brain lesions and biopsy suggestive of tumefactive MS. Biopsy did not find e/o tumor, and there was no e/o infection on CSF sampling, without pleocytosis. Met DIS (PV and EVELIO lesions, then R cerebellar peduncle lesion), DIT on f/u imaging with new amish+ R cerebellar peduncle lesion. \par \par Bloating with tecfidera, opts to switch to tysabri.\par \par Also with severe HAs.\par \par - discussed risk/benefit profile of tysabri including risk of pml with each. she opts to switch to tysabri.\par - vitamin D3 5ku daily\par - labs today including repeat JCV\par - medrol dose pack for HA relief, agree with seeing Dr. Walker for ROLDAN.\par - RTC 1m after starting tysabri.

## 2019-09-26 LAB — 25(OH)D3 SERPL-MCNC: 22.7 NG/ML

## 2019-10-07 LAB
JCV INDEX: 0.38
STRATIFY JCV ANTIBODY: ABNORMAL

## 2019-10-14 ENCOUNTER — MEDICATION RENEWAL (OUTPATIENT)
Age: 29
End: 2019-10-14

## 2019-10-16 ENCOUNTER — APPOINTMENT (OUTPATIENT)
Dept: PAIN MANAGEMENT | Facility: CLINIC | Age: 29
End: 2019-10-16
Payer: COMMERCIAL

## 2019-10-16 VITALS
DIASTOLIC BLOOD PRESSURE: 65 MMHG | HEIGHT: 64 IN | BODY MASS INDEX: 21.17 KG/M2 | HEART RATE: 79 BPM | SYSTOLIC BLOOD PRESSURE: 120 MMHG | WEIGHT: 124 LBS

## 2019-10-16 DIAGNOSIS — G43.709 CHRONIC MIGRAINE W/OUT AURA, NOT INTRACTABLE, W/OUT STATUS MIGRAINOSUS: ICD-10-CM

## 2019-10-16 DIAGNOSIS — R51 HEADACHE: ICD-10-CM

## 2019-10-16 PROCEDURE — 99214 OFFICE O/P EST MOD 30 MIN: CPT

## 2019-10-16 NOTE — HISTORY OF PRESENT ILLNESS
[FreeTextEntry1] : Pt and mother note that her headaches had started to occur back to age 12.  Had acutely worsened in April when hospitalized for tumefactive brain lesion- now being treated for MS and are now on a daily basis (to some degree.)  Notes 2 types of headache which Breaks into sharp stabbing pain and episodic throbbing pain with p/p phobia and nausea.  Had vomited more when younger and less at this point. \par No clear autonomic features with the stabbing events and they last from seconds to 20 minutes.  No other noted dysfunction in conjunction with it.\par Moderate neck pain, no clear allodynia and she denies overuse of acute meds.  Does use thc on daily basis.  + sleep issues with difficulty falling and staying asleep.\par No other noted associated features. \par We did review longer history of thrombocytopenia, idiopathic and more recent diagnosis of tumefactive ms.  Is in process of changing form Tecfidera to iv meds and notes concern over this change.\par  [Chronic Headache] : chronic headache [Nausea] : nausea [Photophobia] : photophobia [Phonophobia] : phonophobia [Neck Pain] : neck pain [Scalp Tenderness] : no scalp tenderness [Daily] : daily [< 4 hours] : < 4 hours [worsened] : worsened [4] : a minimum pain level of 4/10 [9] : a maximum pain level of 9/10 [Worsened] : The patient reports ~his/her~ symptoms since the last visit have worsened

## 2019-10-16 NOTE — PHYSICAL EXAM
[Oriented To Time, Place, And Person] : oriented to person, place, and time [Impaired Insight] : insight and judgment were intact [Affect] : the affect was normal [Mood] : the mood was normal [Memory Recent] : recent memory was not impaired [Memory Remote] : remote memory was not impaired [Person] : oriented to person [Place] : oriented to place [Time] : oriented to time [Short Term Intact] : short term memory intact [Remote Intact] : remote memory intact [Registration Intact] : recent registration memory intact [Concentration Intact] : normal concentrating ability [Visual Intact] : visual attention was ~T not ~L decreased [Naming Objects] : no difficulty naming common objects [Writing A Sentence] : no difficulty writing a sentence [Fluency] : fluency intact [Comprehension] : comprehension intact [Reading] : reading intact [Current Events] : adequate knowledge of current events [Past History] : adequate knowledge of personal past history [Vocabulary] : adequate range of vocabulary [Cranial Nerves Oculomotor (III)] : extraocular motion intact [Cranial Nerves Trigeminal (V)] : facial sensation intact symmetrically [Cranial Nerves Facial (VII)] : face symmetrical [Cranial Nerves Vestibulocochlear (VIII)] : hearing was intact bilaterally [Cranial Nerves Accessory (XI - Cranial And Spinal)] : head turning and shoulder shrug symmetric [Cranial Nerves Hypoglossal (XII)] : there was no tongue deviation with protrusion [Motor Strength] : muscle strength was normal in all four extremities [No Muscle Atrophy] : normal bulk in all four extremities [Motor Handedness Right-Handed] : the patient is right hand dominant [Sensation Tactile Decrease] : light touch was intact [Balance] : balance was intact [2+] : Patella left 2+ [Sclera] : the sclera and conjunctiva were normal [PERRL With Normal Accommodation] : pupils were equal in size, round, reactive to light, with normal accommodation [Extraocular Movements] : extraocular movements were intact [Outer Ear] : the ears and nose were normal in appearance [Neck Cervical Mass (___cm)] : no neck mass was observed [Exaggerated Use Of Accessory Muscles For Inspiration] : no accessory muscle use [Edema] : there was no peripheral edema [Abnormal Walk] : normal gait [Nail Clubbing] : no clubbing  or cyanosis of the fingernails [Involuntary Movements] : no involuntary movements were seen [Musculoskeletal - Swelling] : no joint swelling seen [Motor Tone] : muscle strength and tone were normal [Skin Color & Pigmentation] : normal skin color and pigmentation [Skin Turgor] : normal skin turgor [] : no rash [FreeTextEntry1] : pale sclera [Motor Strength Upper Extremities Bilaterally] : strength was normal in both upper extremities [Motor Strength Lower Extremities Bilaterally] : strength was normal in both lower extremities [Allodynia] : no ~T allodynia present [Limited Balance] : balance was intact [Past-pointing] : there was no past-pointing [Tremor] : no tremor present [Dysdiadochokinesia Bilaterally] : not present [Coordination - Dysmetria Impaired Finger-to-Nose Bilateral] : not present

## 2019-10-16 NOTE — REVIEW OF SYSTEMS
[Fever] : no fever [Chills] : no chills [Feeling Poorly] : feeling poorly [Feeling Tired] : feeling tired [Eyesight Problems] : no eyesight problems [Loss Of Hearing] : no hearing loss [Chest Pain] : no chest pain [Palpitations] : no palpitations [Cough] : no cough [Constipation] : no constipation [Diarrhea] : no diarrhea [Arthralgias] : arthralgias [Neck Pain] : neck pain [Skin Lesions] : no skin lesions [Skin Wound] : no skin wound [Itching] : no itching [Dizziness] : dizziness [Sleep Disturbances] : sleep disturbances [Anxiety] : anxiety [Muscle Weakness] : no muscle weakness [Swollen Glands] : no swollen glands [Swollen Glands In The Neck] : no swollen glands in the neck

## 2019-10-16 NOTE — ASSESSMENT
[FreeTextEntry1] : information provided re: migrainous headache and chronic daily headache.\par to consider nortriptyline preventively- could consider for botox or cgrp antibody\par trial of prn triptan and prn indocin for stabbing pain with instructions re: limiting medication\par rtc 6 weeks to reassess.

## 2019-10-18 ENCOUNTER — MESSAGE (OUTPATIENT)
Age: 29
End: 2019-10-18

## 2019-11-15 ENCOUNTER — APPOINTMENT (OUTPATIENT)
Dept: NEUROLOGY | Facility: CLINIC | Age: 29
End: 2019-11-15
Payer: COMMERCIAL

## 2019-11-15 LAB
HCG UR QL: NEGATIVE
QUALITY CONTROL: YES

## 2019-11-15 PROCEDURE — S0028: CPT

## 2019-11-15 PROCEDURE — 96413 CHEMO IV INFUSION 1 HR: CPT

## 2019-11-15 PROCEDURE — 96415 CHEMO IV INFUSION ADDL HR: CPT

## 2019-11-15 PROCEDURE — 81025 URINE PREGNANCY TEST: CPT

## 2019-11-29 ENCOUNTER — APPOINTMENT (OUTPATIENT)
Dept: NEUROLOGY | Facility: CLINIC | Age: 29
End: 2019-11-29
Payer: COMMERCIAL

## 2019-11-29 VITALS — RESPIRATION RATE: 16 BRPM | HEART RATE: 90 BPM | DIASTOLIC BLOOD PRESSURE: 74 MMHG | SYSTOLIC BLOOD PRESSURE: 143 MMHG

## 2019-11-29 PROCEDURE — 96413 CHEMO IV INFUSION 1 HR: CPT

## 2019-11-29 PROCEDURE — S0028: CPT

## 2019-11-29 PROCEDURE — 96415 CHEMO IV INFUSION ADDL HR: CPT

## 2019-11-29 RX ORDER — OCRELIZUMAB 300 MG/10ML
300 INJECTION INTRAVENOUS
Refills: 0 | Status: COMPLETED | OUTPATIENT
Start: 2019-11-29

## 2019-11-29 RX ORDER — METHYLPREDNISOLONE SODIUM SUCCINATE 125 MG/2ML
125 INJECTION, POWDER, FOR SOLUTION INTRAMUSCULAR; INTRAVENOUS
Qty: 0 | Refills: 0 | Status: COMPLETED | OUTPATIENT
Start: 2019-11-29

## 2019-11-29 RX ORDER — FAMOTIDINE 10 MG/ML
20 INJECTION, SOLUTION INTRAVENOUS
Refills: 0 | Status: COMPLETED | OUTPATIENT
Start: 2019-11-29

## 2019-11-29 RX ADMIN — Medication 0 MG/2ML: at 00:00

## 2019-11-29 RX ADMIN — METHYLPREDNISOLONE SODIUM SUCCINATE 0 MG: 125 INJECTION, POWDER, FOR SOLUTION INTRAMUSCULAR; INTRAVENOUS at 00:00

## 2019-11-29 RX ADMIN — OCRELIZUMAB 0 MG/10ML: 300 INJECTION INTRAVENOUS at 00:00

## 2019-12-03 ENCOUNTER — APPOINTMENT (OUTPATIENT)
Dept: PAIN MANAGEMENT | Facility: CLINIC | Age: 29
End: 2019-12-03

## 2019-12-11 ENCOUNTER — MESSAGE (OUTPATIENT)
Age: 29
End: 2019-12-11

## 2019-12-13 ENCOUNTER — MESSAGE (OUTPATIENT)
Age: 29
End: 2019-12-13

## 2019-12-30 ENCOUNTER — APPOINTMENT (OUTPATIENT)
Dept: NEUROLOGY | Facility: CLINIC | Age: 29
End: 2019-12-30
Payer: COMMERCIAL

## 2019-12-30 VITALS
WEIGHT: 127 LBS | DIASTOLIC BLOOD PRESSURE: 79 MMHG | SYSTOLIC BLOOD PRESSURE: 123 MMHG | HEIGHT: 64 IN | HEART RATE: 71 BPM | BODY MASS INDEX: 21.68 KG/M2

## 2019-12-30 PROCEDURE — 99214 OFFICE O/P EST MOD 30 MIN: CPT

## 2019-12-30 NOTE — HISTORY OF PRESENT ILLNESS
[FreeTextEntry1] : Initial hx 5/2019\par 13yo - migraines, got MRI brain which was unremarkable.\par 4/2019 - bumping into things, thought to have R field cut but pt reports never having problems with vf to finger counting, referred to Dr. Glez. MRI brain revealed large enhancing L tempoparietal lesion, small R frontal amish+ lesion, and R temporal lesion that was also amish+. CSF showed no OCBs, wbc 1, and prot 50, and no serum sample was taken to calculate igg index. Did not get IVMP and was dc'd, then returned with a postural HA that resolved, also had some vomiting. Repeat brain MRI showed increased size of L parietal lesion which was biopsied on 4/25/19, with biopsy showing cortical brain tissue w inc reactive histiocytes, reactive gliosis, no significant lymphocytes, and demyelination - thought to be compatible with MS. \par Never received steroids, began to improve over weeks.\par 5/2019 - repeat brain MRI showed interval decrease in size and enhancement.\par Pt still gets HAs intermittently. Occ "chills", sharp pain in the right side of her low back. Pt believes that visual field cut has also improved but difficult to tell. Occ difficulty coming up with words.\par 5/2019 - got IVMP and didn't note much change in sx but no worsening.\par 7/2019 - new brain mri showed amish+ R cerebellar peduncle lesion, recommended to start dmt - opted for DMF.\par started DMF 8/9/19. frequent bloating that interferes with her day to day, has become more prominent, no diarrhea or n/v. no flushing.\par Opted to start tysabri but seroconverted to JCV+ prior to starting.\par Started ocrevus 11/15 and 11/29/2019. Uneventful.\par \par S:\par \par More severe headaches, being managed by Dr. Walker on nortryptiline 75mg qhs x1m, triptan and indomethacin at onset of HA. Still with HAs 2-3x/wk.\par \par More memory impairment.\par \par Insomnia but also wakes up after 4h. Tried melatonin. Tried increasing exercise.\par \par Started a new job in late 2019.\par \par ROS/Current Sx:\par occ difficulty with speech\par occ back pain.\par no bladder dysfunction. \par chronic constipation\par \par PMHX:\par gastroparesis - for almost 1 decade. pantoprazole, ursodiol.\par ITP in childhood, currently not on rx and plts are wnl.\par \par MEDS:\par OCP\par migraine meds\par \par Exam:\par \par AO3. Normally conversant. Follows commands, names, and repeats. Good attention.\par \par PERRL, VFF, EOMI, no nystagmus, face symmetric, TUP at midline.\par \par Motor: \par  R: L:\par Del 5 5\par Bi 5 5\par Tri 5 5\par Wrist Extensors 5 5\par Finger abductors 5 5\par  5 5 \par \par HF 5 5\par KE 5 5\par KF 5 5\par DF 5 5\par PF 5 5\par \par Tone R L\par UE 0 0 \par LE 0 0\par \par Sensory RUE LUE RLE LLE \par LT + + + +\par Vib + + + +\par JPS + + + +\par PP + + + +\par Temp + + + +\par \par Reflexes:\par  R L \par Biceps 2 2\par BR 2 2\par Triceps 2 2\par Pat 3 3\par AJ 3 3\par \par TOES F F\par \par Coordination:\par  R L \par FTN 0 0 \par ANA 0 0\par HTS 0 0 \par \par Other \par  \par Gait: normal, can heel, toe, and tandem walk\par \par  Assistance: none\par \par autoimmune ab panel neg\par \par CSF 4/2019\par 0 OCBs\par no igg index could be done.\par 1 WBCs\par vdrl neg\par ace wnl\par crypto neg\par \par MRI brain 4/14/19 - large L parietal lesion with incomplete patchy enhancement. 1 small R frontal lesion and 1 medium sized R temporal lesion both of which are subtly enhancing. i don't appreciate any amish- lesions.\par MRI brain 4/19/19 - mild worsening in edema surrounding L parietal lesion.\par MRI brain 5/2019 - improvement in large L parietal lesion, likely new small advidly amish+ L frontal lesion anterior to the large tumefactive lesion.\par MRI brain 7/2019 - resolution of prior enhancement, new amish+ R brachium pontis lesion. \par MRI brain 8/2019 - no new lesions. R brachium pontis lesion is bigger than prior but with only minimal enhancement that was improved from prior.\par \par \par AP: 28yo w/ brain lesions and biopsy suggestive of tumefactive MS. Biopsy did not find e/o tumor, and there was no e/o infection on CSF sampling, without pleocytosis. Met DIS (PV and EVELIO lesions, then R cerebellar peduncle lesion), DIT on f/u imaging with new amish+ R cerebellar peduncle lesion. Bloating with tecfidera, opts to switch to tysabri, but then seroconverted during planning stage so started ocrevus in 11/2019.\par \par Also with severe HAs.\par \par - next ocrevus in 5/2020\par - next MRI in 2/2020.\par - vitamin D3 5ku daily\par - urged to f/u w Dr. Walker for ROLDAN treatment\par - blood work at next visit prior to next ocrevus.\par - ambien 5mg prn sleep\par - RTC 3m

## 2020-01-13 ENCOUNTER — APPOINTMENT (OUTPATIENT)
Dept: PAIN MANAGEMENT | Facility: CLINIC | Age: 30
End: 2020-01-13
Payer: COMMERCIAL

## 2020-01-13 VITALS
HEART RATE: 75 BPM | SYSTOLIC BLOOD PRESSURE: 128 MMHG | WEIGHT: 130 LBS | BODY MASS INDEX: 22.2 KG/M2 | HEIGHT: 64 IN | DIASTOLIC BLOOD PRESSURE: 84 MMHG

## 2020-01-13 PROCEDURE — 99213 OFFICE O/P EST LOW 20 MIN: CPT

## 2020-01-15 NOTE — PHYSICAL EXAM
[General Appearance - In No Acute Distress] : in no acute distress [General Appearance - Well-Appearing] : healthy appearing [General Appearance - Alert] : alert [Oriented To Time, Place, And Person] : oriented to person, place, and time [Cranial Nerves Facial (VII)] : face symmetrical [Mood] : the mood was normal [Affect] : the affect was normal [Cranial Nerves Hypoglossal (XII)] : there was no tongue deviation with protrusion [Cranial Nerves Accessory (XI - Cranial And Spinal)] : head turning and shoulder shrug symmetric [Motor Strength] : muscle strength was normal in all four extremities [Motor Strength Upper Extremities Bilaterally] : strength was normal in both upper extremities [Motor Strength Lower Extremities Bilaterally] : strength was normal in both lower extremities [Paresis Pronator Drift Right-Sided] : no pronator drift on the right [Paresis Pronator Drift Left-Sided] : no pronator drift on the left [Coordination - Dysmetria Impaired Finger-to-Nose Bilateral] : not present [Extraocular Movements] : extraocular movements were intact [Sclera] : the sclera and conjunctiva were normal [PERRL With Normal Accommodation] : pupils were equal in size, round, reactive to light, with normal accommodation [Edema] : there was no peripheral edema [] : no respiratory distress [Respiration, Rhythm And Depth] : normal respiratory rhythm and effort

## 2020-01-15 NOTE — ASSESSMENT
[FreeTextEntry1] : Trial of Emgality . \par Will try for coverage on Eletriptan. \par Pt to return to office once she receives Emgality. \par \par Dr Walker on site , billed incident to service.

## 2020-01-15 NOTE — HISTORY OF PRESENT ILLNESS
[FreeTextEntry1] : Pt returns today for a follow up visit. \par She has been taking Nortriptyline but has not had an improvement in migraine . \par Has not been able to attain Relpax due to  insurance.\par Migraines/ headaches continue to be daily . \par Pt denies any new health issues. \par Discussed wit pt trial of Emgality. \par Discussed potential adverse effects .\par Discussed avoiding pregnancy while on this medications.  \par  [Photophobia] : photophobia [Headache] : headache [Nausea] : nausea [Daily] : daily

## 2020-01-30 ENCOUNTER — APPOINTMENT (OUTPATIENT)
Dept: MRI IMAGING | Facility: CLINIC | Age: 30
End: 2020-01-30

## 2020-02-03 ENCOUNTER — NON-APPOINTMENT (OUTPATIENT)
Age: 30
End: 2020-02-03

## 2020-04-02 NOTE — PATIENT PROFILE ADULT - NSCAGESTDRUGGUILT_GEN_A_NUR
Called Mr. Delgado and reviewed his latest lab work. Explained to him that his renal function had remained stable(CKD 3), current sCr 1.5 from 1.7 in last appt on 1/09/20. He referred feeling well with no acute complaints and with good UOP with current diuretic regimen. Oriented him to continue taking his medications as prescribed and that a follow-up visit will be scheduled. Also, told him to feel free to contact me if he has further questions or concerns.    no

## 2020-05-15 ENCOUNTER — APPOINTMENT (OUTPATIENT)
Dept: NEUROLOGY | Facility: CLINIC | Age: 30
End: 2020-05-15

## 2021-03-17 DIAGNOSIS — Z86.59 PERSONAL HISTORY OF OTHER MENTAL AND BEHAVIORAL DISORDERS: ICD-10-CM

## 2021-03-17 DIAGNOSIS — Z87.42 PERSONAL HISTORY OF OTHER DISEASES OF THE FEMALE GENITAL TRACT: ICD-10-CM

## 2021-03-17 DIAGNOSIS — Z80.3 FAMILY HISTORY OF MALIGNANT NEOPLASM OF BREAST: ICD-10-CM

## 2021-03-17 LAB — CYTOLOGY CVX/VAG DOC THIN PREP: NORMAL

## 2021-04-01 ENCOUNTER — RX RENEWAL (OUTPATIENT)
Age: 31
End: 2021-04-01

## 2021-04-13 ENCOUNTER — APPOINTMENT (OUTPATIENT)
Dept: OBGYN | Facility: CLINIC | Age: 31
End: 2021-04-13

## 2021-07-06 NOTE — PATIENT PROFILE ADULT - NSPROPTRIGHTCAREGIVER_GEN_A_NUR
Continue present medication regimen  TSH to be done with other labs  Follow-up with Dr. Khalida Ordonez as scheduled and as needed  [facility] for Dr. Pipo Bai on July 19, 2021.   Appt with Dr. Barb Flores on July 26, 2021, @ 2:20pm declines

## 2021-10-06 ENCOUNTER — APPOINTMENT (OUTPATIENT)
Dept: OBGYN | Facility: CLINIC | Age: 31
End: 2021-10-06
Payer: COMMERCIAL

## 2021-10-06 VITALS
HEIGHT: 64 IN | BODY MASS INDEX: 18.1 KG/M2 | DIASTOLIC BLOOD PRESSURE: 83 MMHG | SYSTOLIC BLOOD PRESSURE: 133 MMHG | HEART RATE: 91 BPM | WEIGHT: 106 LBS

## 2021-10-06 DIAGNOSIS — Z87.898 PERSONAL HISTORY OF OTHER SPECIFIED CONDITIONS: ICD-10-CM

## 2021-10-06 DIAGNOSIS — Z98.890 PERSONAL HISTORY OF OTHER SPECIFIED CONDITIONS: ICD-10-CM

## 2021-10-06 PROCEDURE — 36415 COLL VENOUS BLD VENIPUNCTURE: CPT

## 2021-10-06 PROCEDURE — 99213 OFFICE O/P EST LOW 20 MIN: CPT

## 2021-10-06 RX ORDER — PREDNISONE 10 MG/1
10 TABLET ORAL
Qty: 21 | Refills: 0 | Status: DISCONTINUED | COMMUNITY
Start: 2019-05-21 | End: 2021-10-06

## 2021-10-06 RX ORDER — ZOLPIDEM TARTRATE 5 MG/1
5 TABLET ORAL
Qty: 30 | Refills: 0 | Status: DISCONTINUED | COMMUNITY
Start: 2019-12-30 | End: 2021-10-06

## 2021-10-06 RX ORDER — RIZATRIPTAN BENZOATE 10 MG/1
10 TABLET ORAL
Qty: 12 | Refills: 5 | Status: DISCONTINUED | COMMUNITY
Start: 2019-10-16 | End: 2021-10-06

## 2021-10-06 RX ORDER — METHYLPREDNISOLONE SODIUM SUCCINATE 1 G/16ML
1000 INJECTION, POWDER, LYOPHILIZED, FOR SOLUTION INTRAMUSCULAR; INTRAVENOUS
Qty: 1 | Refills: 0 | Status: DISCONTINUED | COMMUNITY
Start: 2019-05-21 | End: 2021-10-06

## 2021-10-06 RX ORDER — ACETAMINOPHEN 500 MG/1
500 TABLET, COATED ORAL
Refills: 0 | Status: DISCONTINUED | COMMUNITY
End: 2021-10-06

## 2021-10-06 RX ORDER — NORTRIPTYLINE HYDROCHLORIDE 25 MG/1
25 CAPSULE ORAL
Qty: 90 | Refills: 3 | Status: DISCONTINUED | COMMUNITY
Start: 2019-10-16 | End: 2021-10-06

## 2021-10-06 RX ORDER — INDOMETHACIN 50 MG/1
50 CAPSULE ORAL
Qty: 50 | Refills: 3 | Status: DISCONTINUED | COMMUNITY
Start: 2019-10-16 | End: 2021-10-06

## 2021-10-06 RX ORDER — ONDANSETRON 4 MG/1
4 TABLET, ORALLY DISINTEGRATING ORAL EVERY 8 HOURS
Qty: 15 | Refills: 0 | Status: DISCONTINUED | COMMUNITY
Start: 2019-04-28 | End: 2021-10-06

## 2021-10-06 RX ORDER — ELETRIPTAN HYDROBROMIDE 40 MG/1
40 TABLET, FILM COATED ORAL
Qty: 12 | Refills: 5 | Status: DISCONTINUED | COMMUNITY
Start: 2019-12-13 | End: 2021-10-06

## 2021-10-06 RX ORDER — NORETHINDRONE ACETATE AND ETHINYL ESTRADIOL AND FERROUS FUMARATE 1MG-20(21)
1-20 KIT ORAL
Refills: 0 | Status: DISCONTINUED | COMMUNITY
End: 2021-10-06

## 2021-10-06 RX ORDER — METHYLPREDNISOLONE 4 MG/1
4 TABLET ORAL
Qty: 1 | Refills: 0 | Status: DISCONTINUED | COMMUNITY
Start: 2019-09-25 | End: 2021-10-06

## 2021-10-06 RX ORDER — GALCANEZUMAB 120 MG/ML
120 INJECTION, SOLUTION SUBCUTANEOUS
Qty: 1 | Refills: 3 | Status: DISCONTINUED | COMMUNITY
Start: 2020-01-13 | End: 2021-10-06

## 2021-10-06 RX ORDER — GALCANEZUMAB 120 MG/ML
120 INJECTION, SOLUTION SUBCUTANEOUS
Qty: 2 | Refills: 0 | Status: DISCONTINUED | COMMUNITY
Start: 2020-01-13 | End: 2021-10-06

## 2021-10-06 RX ORDER — ALMOTRIPTAN 12.5 MG/1
12.5 TABLET, FILM COATED ORAL
Qty: 18 | Refills: 5 | Status: DISCONTINUED | COMMUNITY
Start: 2020-03-24 | End: 2021-10-06

## 2021-10-08 LAB — HCG SERPL-MCNC: 3495 MIU/ML

## 2021-10-25 ENCOUNTER — APPOINTMENT (OUTPATIENT)
Dept: OBGYN | Facility: CLINIC | Age: 31
End: 2021-10-25
Payer: COMMERCIAL

## 2021-10-25 ENCOUNTER — LABORATORY RESULT (OUTPATIENT)
Age: 31
End: 2021-10-25

## 2021-10-25 VITALS — DIASTOLIC BLOOD PRESSURE: 74 MMHG | WEIGHT: 113 LBS | SYSTOLIC BLOOD PRESSURE: 119 MMHG | BODY MASS INDEX: 19.4 KG/M2

## 2021-10-25 DIAGNOSIS — Z79.899 OTHER LONG TERM (CURRENT) DRUG THERAPY: ICD-10-CM

## 2021-10-25 PROCEDURE — 0500F INITIAL PRENATAL CARE VISIT: CPT

## 2021-10-25 RX ORDER — OCRELIZUMAB 300 MG/10ML
300 INJECTION INTRAVENOUS
Qty: 2 | Refills: 1 | Status: DISCONTINUED | COMMUNITY
Start: 2019-10-07 | End: 2021-10-25

## 2021-10-25 NOTE — PLAN
[FreeTextEntry1] : 31 yr old with LMP 08/31/2021 + home pregnancy test \par - PAP, GC/CT \par - + IUP, +FH \par - bilateral breast exam \par - prenatal labs sent \par - PNV sent \par - MS will be treated with IVIG. \par -ITP has own  hematologist \par - discussed diet/ exercise/ medications \par - f/u in 2 weeks for dating sono

## 2021-10-25 NOTE — REVIEW OF SYSTEMS
[Negative] : Endocrine [FreeTextEntry5] : heart murmur [de-identified] : MS [de-identified] : ITP

## 2021-10-25 NOTE — HISTORY OF PRESENT ILLNESS
[N] : Patient does not use contraception [Y] : Positive pregnancy history [Yes] : pregnancy [FreeTextEntry1] : Patient is a 31 year  y/o for NOB visit with LMP 08/31/2021, + home pregnancy test.  She is feeling well with complaint of occasional nausea and breast tenderness. She denies vaginal itching, odor and discharge. Denies urinary urgency, frequency and dysuria.\par  [LMPDate] : 08/31/2021 [MensesFreq] : 30 [MensesLength] : 5 [PGxTotal] : 1 [PGHxFullTerm] : 0 [PGHxPremature] : 0 [PGHxAbortions] : 0 [[facility]Living] : 0 [TextBox_6] : 08/31/2021

## 2021-10-26 LAB
ABO + RH PNL BLD: NORMAL
APPEARANCE: ABNORMAL
BACTERIA: ABNORMAL
BASOPHILS # BLD AUTO: 0.03 K/UL
BASOPHILS NFR BLD AUTO: 0.2 %
BILIRUBIN URINE: NEGATIVE
BLOOD URINE: NEGATIVE
COLOR: YELLOW
EOSINOPHIL # BLD AUTO: 0.2 K/UL
EOSINOPHIL NFR BLD AUTO: 1.5 %
ESTIMATED AVERAGE GLUCOSE: 97 MG/DL
GLUCOSE QUALITATIVE U: NEGATIVE
GLUCOSE SERPL-MCNC: 94 MG/DL
HBA1C MFR BLD HPLC: 5 %
HCT VFR BLD CALC: 37.5 %
HCV AB SER QL: NONREACTIVE
HCV S/CO RATIO: 0.36 S/CO
HGB A MFR BLD: 97.2 %
HGB A2 MFR BLD: 2.8 %
HGB BLD-MCNC: 12.5 G/DL
HGB FRACT BLD-IMP: NORMAL
HYALINE CASTS: 3 /LPF
IMM GRANULOCYTES NFR BLD AUTO: 0.3 %
KETONES URINE: NEGATIVE
LEUKOCYTE ESTERASE URINE: NEGATIVE
LYMPHOCYTES # BLD AUTO: 2.38 K/UL
LYMPHOCYTES NFR BLD AUTO: 18.1 %
MAN DIFF?: NORMAL
MCHC RBC-ENTMCNC: 29.8 PG
MCHC RBC-ENTMCNC: 33.3 GM/DL
MCV RBC AUTO: 89.5 FL
MICROSCOPIC-UA: NORMAL
MONOCYTES # BLD AUTO: 0.8 K/UL
MONOCYTES NFR BLD AUTO: 6.1 %
NEUTROPHILS # BLD AUTO: 9.73 K/UL
NEUTROPHILS NFR BLD AUTO: 73.8 %
NITRITE URINE: NEGATIVE
PH URINE: 5.5
PLATELET # BLD AUTO: 279 K/UL
PROTEIN URINE: NORMAL
RBC # BLD: 4.19 M/UL
RBC # FLD: 13.2 %
RED BLOOD CELLS URINE: 11 /HPF
SPECIFIC GRAVITY URINE: 1.03
SQUAMOUS EPITHELIAL CELLS: 12 /HPF
UROBILINOGEN URINE: NORMAL
WBC # FLD AUTO: 13.18 K/UL
WHITE BLOOD CELLS URINE: 13 /HPF

## 2021-10-27 LAB
HAV IGM SER QL: NONREACTIVE
HBV CORE IGM SER QL: NONREACTIVE
HBV SURFACE AG SER QL: NONREACTIVE
HBV SURFACE AG SER QL: NONREACTIVE
HCV AB SER QL: NONREACTIVE
HCV S/CO RATIO: 0.33 S/CO
LEAD BLD-MCNC: <1 UG/DL
MEV IGG FLD QL IA: 183 AU/ML
MEV IGG+IGM SER-IMP: POSITIVE
MUV AB SER-ACNC: POSITIVE
MUV IGG SER QL IA: 12.1 AU/ML
RUBV IGG FLD-ACNC: 10.3 INDEX
RUBV IGG SER-IMP: POSITIVE

## 2021-10-28 LAB
BACTERIA UR CULT: NORMAL
C TRACH RRNA SPEC QL NAA+PROBE: NOT DETECTED
M TB IFN-G BLD-IMP: NEGATIVE
N GONORRHOEA RRNA SPEC QL NAA+PROBE: NOT DETECTED
QUANTIFERON TB PLUS MITOGEN MINUS NIL: 8.49 IU/ML
QUANTIFERON TB PLUS NIL: 0.01 IU/ML
QUANTIFERON TB PLUS TB1 MINUS NIL: 0.01 IU/ML
QUANTIFERON TB PLUS TB2 MINUS NIL: 0 IU/ML
SOURCE AMPLIFICATION: NORMAL

## 2021-11-01 LAB
CFTR MUT TESTED BLD/T: NEGATIVE
CYTOLOGY CVX/VAG DOC THIN PREP: ABNORMAL

## 2021-11-02 LAB
AR GENE MUT ANL BLD/T: NORMAL
FMR1 GENE MUT ANL BLD/T: NORMAL

## 2021-11-08 ENCOUNTER — NON-APPOINTMENT (OUTPATIENT)
Age: 31
End: 2021-11-08

## 2021-11-08 ENCOUNTER — APPOINTMENT (OUTPATIENT)
Dept: OBGYN | Facility: CLINIC | Age: 31
End: 2021-11-08
Payer: COMMERCIAL

## 2021-11-08 ENCOUNTER — APPOINTMENT (OUTPATIENT)
Dept: ANTEPARTUM | Facility: CLINIC | Age: 31
End: 2021-11-08
Payer: COMMERCIAL

## 2021-11-08 PROCEDURE — 0502F SUBSEQUENT PRENATAL CARE: CPT

## 2021-11-08 PROCEDURE — 76801 OB US < 14 WKS SINGLE FETUS: CPT

## 2021-11-22 ENCOUNTER — APPOINTMENT (OUTPATIENT)
Dept: ANTEPARTUM | Facility: CLINIC | Age: 31
End: 2021-11-22
Payer: COMMERCIAL

## 2021-11-22 ENCOUNTER — APPOINTMENT (OUTPATIENT)
Dept: OBGYN | Facility: CLINIC | Age: 31
End: 2021-11-22

## 2021-11-22 VITALS — DIASTOLIC BLOOD PRESSURE: 70 MMHG | SYSTOLIC BLOOD PRESSURE: 123 MMHG | BODY MASS INDEX: 20.25 KG/M2 | WEIGHT: 118 LBS

## 2021-11-22 PROCEDURE — 76813 OB US NUCHAL MEAS 1 GEST: CPT

## 2021-11-23 LAB
BASOPHILS # BLD AUTO: 0.01 K/UL
BASOPHILS NFR BLD AUTO: 0.1 %
EOSINOPHIL # BLD AUTO: 0.07 K/UL
EOSINOPHIL NFR BLD AUTO: 0.6 %
HCT VFR BLD CALC: 38.6 %
HGB BLD-MCNC: 12.1 G/DL
IMM GRANULOCYTES NFR BLD AUTO: 0.2 %
LYMPHOCYTES # BLD AUTO: 2.19 K/UL
LYMPHOCYTES NFR BLD AUTO: 18 %
MAN DIFF?: NORMAL
MCHC RBC-ENTMCNC: 29.7 PG
MCHC RBC-ENTMCNC: 31.3 GM/DL
MCV RBC AUTO: 94.8 FL
MONOCYTES # BLD AUTO: 0.64 K/UL
MONOCYTES NFR BLD AUTO: 5.3 %
NEUTROPHILS # BLD AUTO: 9.2 K/UL
NEUTROPHILS NFR BLD AUTO: 75.8 %
PLATELET # BLD AUTO: 309 K/UL
RBC # BLD: 4.07 M/UL
RBC # FLD: 13.5 %
WBC # FLD AUTO: 12.14 K/UL

## 2021-11-26 LAB
1ST TRIMESTER DATA: NORMAL
ADDENDUM DOC: NORMAL
AFP PNL SERPL: NORMAL
AFP SERPL-ACNC: NORMAL
CLINICAL BIOCHEMIST REVIEW: NORMAL
FREE BETA HCG 1ST TRIMESTER: NORMAL
Lab: NORMAL
NASAL BONE: PRESENT
NOTES NTD: NORMAL
NT: NORMAL
PAPP-A SERPL-ACNC: NORMAL
TRISOMY 18/3: NORMAL

## 2021-11-29 ENCOUNTER — NON-APPOINTMENT (OUTPATIENT)
Age: 31
End: 2021-11-29

## 2021-12-06 LAB
CLARI ADDITIONAL INFO: NORMAL
CLARI CHROMOSOME 13: NORMAL
CLARI CHROMOSOME 18: NORMAL
CLARI CHROMOSOME 21: NORMAL
CLARI SEX CHROMOSOMES: NORMAL
CLARITEST NIPT: NORMAL
MATERNAL WEIGHT (LBS):: NORMAL
PLEASE INCLUDE GENDER RESULTS ON THIS REPORT:: NORMAL
TYPE OF PREGNANCY:: NORMAL

## 2021-12-20 ENCOUNTER — LABORATORY RESULT (OUTPATIENT)
Age: 31
End: 2021-12-20

## 2021-12-20 ENCOUNTER — APPOINTMENT (OUTPATIENT)
Dept: OBGYN | Facility: CLINIC | Age: 31
End: 2021-12-20

## 2021-12-20 DIAGNOSIS — Z3A.12 12 WEEKS GESTATION OF PREGNANCY: ICD-10-CM

## 2021-12-20 DIAGNOSIS — Z3A.09 9 WEEKS GESTATION OF PREGNANCY: ICD-10-CM

## 2021-12-20 DIAGNOSIS — H53.47 HETERONYMOUS BILATERAL FIELD DEFECTS: ICD-10-CM

## 2021-12-20 DIAGNOSIS — N92.6 IRREGULAR MENSTRUATION, UNSPECIFIED: ICD-10-CM

## 2021-12-20 DIAGNOSIS — Z87.898 PERSONAL HISTORY OF OTHER SPECIFIED CONDITIONS: ICD-10-CM

## 2021-12-21 LAB
BASOPHILS # BLD AUTO: 0.01 K/UL
BASOPHILS NFR BLD AUTO: 0.1 %
EOSINOPHIL # BLD AUTO: 0.01 K/UL
EOSINOPHIL NFR BLD AUTO: 0.1 %
HCT VFR BLD CALC: 35.8 %
HGB BLD-MCNC: 11.2 G/DL
IMM GRANULOCYTES NFR BLD AUTO: 0.4 %
LYMPHOCYTES # BLD AUTO: 0.3 K/UL
LYMPHOCYTES NFR BLD AUTO: 3.8 %
MAN DIFF?: NORMAL
MCHC RBC-ENTMCNC: 29.3 PG
MCHC RBC-ENTMCNC: 31.3 GM/DL
MCV RBC AUTO: 93.7 FL
MONOCYTES # BLD AUTO: 0.67 K/UL
MONOCYTES NFR BLD AUTO: 8.5 %
NEUTROPHILS # BLD AUTO: 6.9 K/UL
NEUTROPHILS NFR BLD AUTO: 87.1 %
PLATELET # BLD AUTO: 263 K/UL
RBC # BLD: 3.82 M/UL
RBC # FLD: 14.5 %
WBC # FLD AUTO: 7.92 K/UL

## 2021-12-22 LAB
B19V IGG SER QL IA: 7.22 INDEX
B19V IGG+IGM SER-IMP: NORMAL
B19V IGG+IGM SER-IMP: POSITIVE
B19V IGM FLD-ACNC: 0.15 INDEX
B19V IGM SER-ACNC: NEGATIVE
CMV IGG SERPL QL: 7.8 U/ML
CMV IGG SERPL-IMP: POSITIVE
CMV IGM SERPL QL: <8 AU/ML
CMV IGM SERPL QL: NEGATIVE
RUBV IGG FLD-ACNC: 19.4 INDEX
RUBV IGG SER-IMP: POSITIVE
RUBV IGM FLD-ACNC: <20 AU/ML
T GONDII AB SER-IMP: NEGATIVE
T GONDII AB SER-IMP: NEGATIVE
T GONDII IGG SER QL: 3.7 IU/ML
T GONDII IGM SER QL: <3 AU/ML
VZV AB TITR SER: POSITIVE
VZV IGG SER IF-ACNC: >4000 INDEX

## 2022-01-19 ENCOUNTER — APPOINTMENT (OUTPATIENT)
Dept: ANTEPARTUM | Facility: CLINIC | Age: 32
End: 2022-01-19
Payer: COMMERCIAL

## 2022-01-19 ENCOUNTER — APPOINTMENT (OUTPATIENT)
Dept: OBGYN | Facility: CLINIC | Age: 32
End: 2022-01-19

## 2022-01-19 VITALS — BODY MASS INDEX: 22.49 KG/M2 | SYSTOLIC BLOOD PRESSURE: 133 MMHG | WEIGHT: 131 LBS | DIASTOLIC BLOOD PRESSURE: 82 MMHG

## 2022-01-19 PROCEDURE — 76805 OB US >/= 14 WKS SNGL FETUS: CPT

## 2022-02-17 ENCOUNTER — APPOINTMENT (OUTPATIENT)
Dept: OBGYN | Facility: CLINIC | Age: 32
End: 2022-02-17

## 2022-02-23 ENCOUNTER — NON-APPOINTMENT (OUTPATIENT)
Age: 32
End: 2022-02-23

## 2022-02-23 ENCOUNTER — APPOINTMENT (OUTPATIENT)
Dept: OBGYN | Facility: CLINIC | Age: 32
End: 2022-02-23
Payer: COMMERCIAL

## 2022-02-23 VITALS
DIASTOLIC BLOOD PRESSURE: 81 MMHG | SYSTOLIC BLOOD PRESSURE: 134 MMHG | WEIGHT: 140 LBS | HEIGHT: 64 IN | BODY MASS INDEX: 23.9 KG/M2

## 2022-02-23 PROCEDURE — 99212 OFFICE O/P EST SF 10 MIN: CPT

## 2022-02-24 LAB
BASOPHILS # BLD AUTO: 0.03 K/UL
BASOPHILS NFR BLD AUTO: 0.2 %
EOSINOPHIL # BLD AUTO: 0.21 K/UL
EOSINOPHIL NFR BLD AUTO: 1.6 %
GLUCOSE 1H P 50 G GLC PO SERPL-MCNC: 82 MG/DL
HCT VFR BLD CALC: 37.3 %
HGB BLD-MCNC: 11.4 G/DL
IMM GRANULOCYTES NFR BLD AUTO: 0.5 %
LYMPHOCYTES # BLD AUTO: 2.15 K/UL
LYMPHOCYTES NFR BLD AUTO: 16.3 %
MAN DIFF?: NORMAL
MCHC RBC-ENTMCNC: 29.2 PG
MCHC RBC-ENTMCNC: 30.6 GM/DL
MCV RBC AUTO: 95.4 FL
MONOCYTES # BLD AUTO: 1.01 K/UL
MONOCYTES NFR BLD AUTO: 7.7 %
NEUTROPHILS # BLD AUTO: 9.71 K/UL
NEUTROPHILS NFR BLD AUTO: 73.7 %
PLATELET # BLD AUTO: 303 K/UL
RBC # BLD: 3.91 M/UL
RBC # FLD: 14.2 %
WBC # FLD AUTO: 13.17 K/UL

## 2022-03-16 ENCOUNTER — APPOINTMENT (OUTPATIENT)
Dept: OBGYN | Facility: CLINIC | Age: 32
End: 2022-03-16
Payer: COMMERCIAL

## 2022-03-16 VITALS — DIASTOLIC BLOOD PRESSURE: 80 MMHG | BODY MASS INDEX: 24.89 KG/M2 | SYSTOLIC BLOOD PRESSURE: 126 MMHG | WEIGHT: 145 LBS

## 2022-03-16 PROCEDURE — 0502F SUBSEQUENT PRENATAL CARE: CPT

## 2022-03-16 RX ORDER — HUMAN RHO(D) IMMUNE GLOBULIN 300 UG/1
1500 INJECTION, SOLUTION INTRAMUSCULAR
Refills: 0 | Status: COMPLETED | OUTPATIENT
Start: 2022-03-16

## 2022-03-18 LAB
BASOPHILS # BLD AUTO: 0.02 K/UL
BASOPHILS NFR BLD AUTO: 0.2 %
EOSINOPHIL # BLD AUTO: 0.19 K/UL
EOSINOPHIL NFR BLD AUTO: 1.6 %
HCT VFR BLD CALC: 36 %
HGB BLD-MCNC: 11.2 G/DL
IMM GRANULOCYTES NFR BLD AUTO: 0.4 %
LYMPHOCYTES # BLD AUTO: 2.21 K/UL
LYMPHOCYTES NFR BLD AUTO: 18.1 %
MAN DIFF?: NORMAL
MCHC RBC-ENTMCNC: 28.7 PG
MCHC RBC-ENTMCNC: 31.1 GM/DL
MCV RBC AUTO: 92.3 FL
MONOCYTES # BLD AUTO: 0.89 K/UL
MONOCYTES NFR BLD AUTO: 7.3 %
NEUTROPHILS # BLD AUTO: 8.84 K/UL
NEUTROPHILS NFR BLD AUTO: 72.4 %
PLATELET # BLD AUTO: 286 K/UL
RBC # BLD: 3.9 M/UL
RBC # FLD: 13.8 %
WBC # FLD AUTO: 12.2 K/UL

## 2022-03-30 ENCOUNTER — APPOINTMENT (OUTPATIENT)
Dept: ANTEPARTUM | Facility: CLINIC | Age: 32
End: 2022-03-30
Payer: COMMERCIAL

## 2022-03-30 ENCOUNTER — APPOINTMENT (OUTPATIENT)
Dept: OBGYN | Facility: CLINIC | Age: 32
End: 2022-03-30
Payer: COMMERCIAL

## 2022-03-30 VITALS
SYSTOLIC BLOOD PRESSURE: 121 MMHG | DIASTOLIC BLOOD PRESSURE: 77 MMHG | BODY MASS INDEX: 25.27 KG/M2 | WEIGHT: 148 LBS | HEIGHT: 64 IN

## 2022-03-30 PROCEDURE — 76819 FETAL BIOPHYS PROFIL W/O NST: CPT

## 2022-03-30 PROCEDURE — 76816 OB US FOLLOW-UP PER FETUS: CPT

## 2022-03-30 PROCEDURE — 0502F SUBSEQUENT PRENATAL CARE: CPT

## 2022-04-14 ENCOUNTER — APPOINTMENT (OUTPATIENT)
Dept: OBGYN | Facility: CLINIC | Age: 32
End: 2022-04-14
Payer: COMMERCIAL

## 2022-04-14 VITALS
SYSTOLIC BLOOD PRESSURE: 125 MMHG | WEIGHT: 152 LBS | DIASTOLIC BLOOD PRESSURE: 71 MMHG | HEIGHT: 64 IN | BODY MASS INDEX: 25.95 KG/M2

## 2022-04-14 PROCEDURE — 0502F SUBSEQUENT PRENATAL CARE: CPT

## 2022-04-25 ENCOUNTER — APPOINTMENT (OUTPATIENT)
Dept: OBGYN | Facility: CLINIC | Age: 32
End: 2022-04-25
Payer: COMMERCIAL

## 2022-04-25 PROCEDURE — 0502F SUBSEQUENT PRENATAL CARE: CPT

## 2022-05-09 ENCOUNTER — APPOINTMENT (OUTPATIENT)
Dept: OBGYN | Facility: CLINIC | Age: 32
End: 2022-05-09
Payer: COMMERCIAL

## 2022-05-09 VITALS — SYSTOLIC BLOOD PRESSURE: 124 MMHG | DIASTOLIC BLOOD PRESSURE: 81 MMHG | BODY MASS INDEX: 28.15 KG/M2 | WEIGHT: 164 LBS

## 2022-05-09 DIAGNOSIS — Z3A.16 16 WEEKS GESTATION OF PREGNANCY: ICD-10-CM

## 2022-05-09 DIAGNOSIS — Z3A.25 25 WEEKS GESTATION OF PREGNANCY: ICD-10-CM

## 2022-05-09 PROCEDURE — 0502F SUBSEQUENT PRENATAL CARE: CPT

## 2022-05-10 LAB — HIV1+2 AB SPEC QL IA.RAPID: NONREACTIVE

## 2022-05-14 LAB — B-HEM STREP SPEC QL CULT: NORMAL

## 2022-05-16 ENCOUNTER — APPOINTMENT (OUTPATIENT)
Dept: OBGYN | Facility: CLINIC | Age: 32
End: 2022-05-16
Payer: COMMERCIAL

## 2022-05-16 PROCEDURE — 0502F SUBSEQUENT PRENATAL CARE: CPT

## 2022-05-23 ENCOUNTER — APPOINTMENT (OUTPATIENT)
Dept: ANTEPARTUM | Facility: CLINIC | Age: 32
End: 2022-05-23
Payer: COMMERCIAL

## 2022-05-23 ENCOUNTER — APPOINTMENT (OUTPATIENT)
Dept: OBGYN | Facility: CLINIC | Age: 32
End: 2022-05-23
Payer: COMMERCIAL

## 2022-05-23 VITALS
HEIGHT: 64 IN | SYSTOLIC BLOOD PRESSURE: 120 MMHG | DIASTOLIC BLOOD PRESSURE: 77 MMHG | WEIGHT: 160 LBS | BODY MASS INDEX: 27.31 KG/M2

## 2022-05-23 PROCEDURE — 76816 OB US FOLLOW-UP PER FETUS: CPT

## 2022-05-23 PROCEDURE — 0502F SUBSEQUENT PRENATAL CARE: CPT

## 2022-05-23 PROCEDURE — 76819 FETAL BIOPHYS PROFIL W/O NST: CPT

## 2022-06-01 ENCOUNTER — APPOINTMENT (OUTPATIENT)
Dept: OBGYN | Facility: CLINIC | Age: 32
End: 2022-06-01
Payer: COMMERCIAL

## 2022-06-01 VITALS
HEIGHT: 64 IN | WEIGHT: 160 LBS | BODY MASS INDEX: 27.31 KG/M2 | SYSTOLIC BLOOD PRESSURE: 104 MMHG | DIASTOLIC BLOOD PRESSURE: 68 MMHG

## 2022-06-01 PROCEDURE — 0502F SUBSEQUENT PRENATAL CARE: CPT

## 2022-06-07 ENCOUNTER — APPOINTMENT (OUTPATIENT)
Dept: ANTEPARTUM | Facility: CLINIC | Age: 32
End: 2022-06-07

## 2022-06-07 ENCOUNTER — APPOINTMENT (OUTPATIENT)
Dept: OBGYN | Facility: CLINIC | Age: 32
End: 2022-06-07

## 2022-06-07 VITALS
DIASTOLIC BLOOD PRESSURE: 75 MMHG | BODY MASS INDEX: 27.31 KG/M2 | HEIGHT: 64 IN | SYSTOLIC BLOOD PRESSURE: 121 MMHG | WEIGHT: 160 LBS

## 2022-06-07 PROCEDURE — 76819 FETAL BIOPHYS PROFIL W/O NST: CPT

## 2022-06-07 PROCEDURE — 76816 OB US FOLLOW-UP PER FETUS: CPT

## 2022-06-07 PROCEDURE — 0502F SUBSEQUENT PRENATAL CARE: CPT

## 2022-06-13 ENCOUNTER — APPOINTMENT (OUTPATIENT)
Dept: ANTEPARTUM | Facility: CLINIC | Age: 32
End: 2022-06-13

## 2022-06-13 ENCOUNTER — APPOINTMENT (OUTPATIENT)
Dept: OBGYN | Facility: CLINIC | Age: 32
End: 2022-06-13

## 2022-06-13 VITALS — DIASTOLIC BLOOD PRESSURE: 79 MMHG | SYSTOLIC BLOOD PRESSURE: 116 MMHG

## 2022-06-13 PROCEDURE — 76818 FETAL BIOPHYS PROFILE W/NST: CPT

## 2022-06-13 PROCEDURE — 76816 OB US FOLLOW-UP PER FETUS: CPT

## 2022-06-13 PROCEDURE — 59426 ANTEPARTUM CARE ONLY: CPT

## 2022-06-14 ENCOUNTER — NON-APPOINTMENT (OUTPATIENT)
Age: 32
End: 2022-06-14

## 2022-06-14 ENCOUNTER — INPATIENT (INPATIENT)
Facility: HOSPITAL | Age: 32
LOS: 2 days | Discharge: HOME | End: 2022-06-17
Attending: OBSTETRICS & GYNECOLOGY | Admitting: OBSTETRICS & GYNECOLOGY

## 2022-06-14 VITALS — TEMPERATURE: 99 F

## 2022-06-14 DIAGNOSIS — Z78.9 OTHER SPECIFIED HEALTH STATUS: ICD-10-CM

## 2022-06-14 DIAGNOSIS — O26.899 OTHER SPECIFIED PREGNANCY RELATED CONDITIONS, UNSPECIFIED TRIMESTER: ICD-10-CM

## 2022-06-14 DIAGNOSIS — Z3A.00 WEEKS OF GESTATION OF PREGNANCY NOT SPECIFIED: ICD-10-CM

## 2022-06-14 DIAGNOSIS — Z90.49 ACQUIRED ABSENCE OF OTHER SPECIFIED PARTS OF DIGESTIVE TRACT: Chronic | ICD-10-CM

## 2022-06-14 LAB
BASOPHILS # BLD AUTO: 0.02 K/UL — SIGNIFICANT CHANGE UP (ref 0–0.2)
BASOPHILS NFR BLD AUTO: 0.2 % — SIGNIFICANT CHANGE UP (ref 0–2)
BLD GP AB SCN SERPL QL: NEGATIVE — SIGNIFICANT CHANGE UP
EOSINOPHIL # BLD AUTO: 0.12 K/UL — SIGNIFICANT CHANGE UP (ref 0–0.5)
EOSINOPHIL NFR BLD AUTO: 1.2 % — SIGNIFICANT CHANGE UP (ref 0–6)
HCT VFR BLD CALC: 37.8 % — SIGNIFICANT CHANGE UP (ref 34.5–45)
HGB BLD-MCNC: 11.9 G/DL — SIGNIFICANT CHANGE UP (ref 11.5–15.5)
IANC: 7.58 K/UL — HIGH (ref 1.8–7.4)
IMM GRANULOCYTES NFR BLD AUTO: 0.6 % — SIGNIFICANT CHANGE UP (ref 0–1.5)
LYMPHOCYTES # BLD AUTO: 1.68 K/UL — SIGNIFICANT CHANGE UP (ref 1–3.3)
LYMPHOCYTES # BLD AUTO: 16.2 % — SIGNIFICANT CHANGE UP (ref 13–44)
MCHC RBC-ENTMCNC: 28.7 PG — SIGNIFICANT CHANGE UP (ref 27–34)
MCHC RBC-ENTMCNC: 31.5 GM/DL — LOW (ref 32–36)
MCV RBC AUTO: 91.1 FL — SIGNIFICANT CHANGE UP (ref 80–100)
MONOCYTES # BLD AUTO: 0.89 K/UL — SIGNIFICANT CHANGE UP (ref 0–0.9)
MONOCYTES NFR BLD AUTO: 8.6 % — SIGNIFICANT CHANGE UP (ref 2–14)
NEUTROPHILS # BLD AUTO: 7.58 K/UL — HIGH (ref 1.8–7.4)
NEUTROPHILS NFR BLD AUTO: 73.2 % — SIGNIFICANT CHANGE UP (ref 43–77)
NRBC # BLD: 0 /100 WBCS — SIGNIFICANT CHANGE UP
NRBC # FLD: 0 K/UL — SIGNIFICANT CHANGE UP
PLATELET # BLD AUTO: 257 K/UL — SIGNIFICANT CHANGE UP (ref 150–400)
RBC # BLD: 4.15 M/UL — SIGNIFICANT CHANGE UP (ref 3.8–5.2)
RBC # FLD: 14.5 % — SIGNIFICANT CHANGE UP (ref 10.3–14.5)
RH IG SCN BLD-IMP: NEGATIVE — SIGNIFICANT CHANGE UP
RH IG SCN BLD-IMP: NEGATIVE — SIGNIFICANT CHANGE UP
WBC # BLD: 10.35 K/UL — SIGNIFICANT CHANGE UP (ref 3.8–10.5)
WBC # FLD AUTO: 10.35 K/UL — SIGNIFICANT CHANGE UP (ref 3.8–10.5)

## 2022-06-14 PROCEDURE — 59409 OBSTETRICAL CARE: CPT | Mod: U9

## 2022-06-14 RX ORDER — INFLUENZA VIRUS VACCINE 15; 15; 15; 15 UG/.5ML; UG/.5ML; UG/.5ML; UG/.5ML
0.5 SUSPENSION INTRAMUSCULAR ONCE
Refills: 0 | Status: DISCONTINUED | OUTPATIENT
Start: 2022-06-14 | End: 2022-06-17

## 2022-06-14 RX ORDER — CITRIC ACID/SODIUM CITRATE 300-500 MG
15 SOLUTION, ORAL ORAL EVERY 6 HOURS
Refills: 0 | Status: DISCONTINUED | OUTPATIENT
Start: 2022-06-14 | End: 2022-06-14

## 2022-06-14 RX ORDER — SODIUM CHLORIDE 9 MG/ML
1000 INJECTION, SOLUTION INTRAVENOUS
Refills: 0 | Status: DISCONTINUED | OUTPATIENT
Start: 2022-06-14 | End: 2022-06-15

## 2022-06-14 RX ORDER — SODIUM CHLORIDE 9 MG/ML
1000 INJECTION, SOLUTION INTRAVENOUS
Refills: 0 | Status: DISCONTINUED | OUTPATIENT
Start: 2022-06-14 | End: 2022-06-14

## 2022-06-14 RX ORDER — OXYTOCIN 10 UNIT/ML
333.33 VIAL (ML) INJECTION
Qty: 20 | Refills: 0 | Status: DISCONTINUED | OUTPATIENT
Start: 2022-06-14 | End: 2022-06-15

## 2022-06-14 RX ORDER — CITRIC ACID/SODIUM CITRATE 300-500 MG
15 SOLUTION, ORAL ORAL EVERY 6 HOURS
Refills: 0 | Status: DISCONTINUED | OUTPATIENT
Start: 2022-06-14 | End: 2022-06-15

## 2022-06-14 RX ORDER — OXYTOCIN 10 UNIT/ML
333.33 VIAL (ML) INJECTION
Qty: 20 | Refills: 0 | Status: DISCONTINUED | OUTPATIENT
Start: 2022-06-14 | End: 2022-06-14

## 2022-06-14 RX ADMIN — SODIUM CHLORIDE 125 MILLILITER(S): 9 INJECTION, SOLUTION INTRAVENOUS at 18:34

## 2022-06-14 NOTE — OB PROVIDER H&P - NSHPPHYSICALEXAM_GEN_ALL_CORE
VS  T(C): 37.1 (06-14-22 @ 17:19)  HR: 92 (06-14-22 @ 17:21)  BP: 118/76 (06-14-22 @ 17:21)  RR: --  SpO2: --    Physical Exam:  Gen: NAD, AOx3  CV: RRR  Resp: CTAB  Abd: soft, NT, gravid    Speculum Exam: deferred    SVE: 0/50/-3  FHT: Category 1  [place]: irregular   EFW: 3625g  Sono: cephalic presentation

## 2022-06-14 NOTE — OB RN PATIENT PROFILE - BIRTH SEX
OFFICE VISIT    Leslie is a 14 m.o. female      History given by mom     CC:   Chief Complaint   Patient presents with   • Follow-Up     ER for labia issues and possible uti   • Follow-Up     [facility] got scratch by cat and got cellulitis        HPI: Leslie presents with her mom and aunt.              Presently has several concerns:    1. She is here to follow-up recent emergency department visit where she had had several days of high fever (>101), vomiting and diarrhea.  On evaluation she was noted to be euvolemic otherwise well-appearing and attempting to obtain a urine sample ED physician noted significant labial adhesions and they were unable to obtain the urine cath or bag specimen from child at that time.  They she was sent home with a presumptive diagnosis of viral AGE.    2. Unfortunately a few days later she went to urgent care due to concern for infected cat scratch.  At that time she was placed on Augmentin due to the redness and tenderness of the scratch.  She has been compliant with Augmentin and mom notes interval resolution of fever approximately 24 to 36 hours after beginning Augmentin. They note the area of redness and tenderness has markedly gone down and near resolved at this time.    She has back eating well and no longer has AGE symptoms.    3. Mom does note that child has had interval worsening of labial adhesion.  She has done research and would prefer not to do topical estrogen but would rather use topical steroid if possible.    4. She is concerned that this has caused the several bouts of illness that she has had, nearly 1 per mom since October.  She is worried that these are undiagnosed urinary tract infection secondary to the labial adhesions.  Prior to significant adhesion child never had a urinary tract infection.  In reviewing the ED records all are consistent with the diagnoses given and not urinary tract infection.             REVIEW OF SYSTEMS:  Review of Systems  "  Constitutional: Negative.    Respiratory: Negative for cough.    Gastrointestinal: Negative.    Genitourinary: Negative for dysuria and hematuria.       PMH: No past medical history on file.  Allergies: Patient has no known allergies.  PSH: No past surgical history on file.  FHx:   Family History   Problem Relation Age of Onset   • Heart Disease Maternal Grandfather         Copied from mother's family history at birth     Soc:   Social History     Lifestyle   • Physical activity:     Days per week: Not on file     Minutes per session: Not on file   • Stress: Not on file   Relationships   • Social connections:     Talks on phone: Not on file     Gets together: Not on file     Attends Faith service: Not on file     Active member of club or organization: Not on file     Attends meetings of clubs or organizations: Not on file     Relationship status: Not on file   • Intimate partner violence:     Fear of current or ex partner: Not on file     Emotionally abused: Not on file     Physically abused: Not on file     Forced sexual activity: Not on file   Other Topics Concern   • Not on file   Social History Narrative   • Not on file         PHYSICAL EXAM:   Reviewed vital signs and growth parameters in EMR.   Pulse 128   Temp 36.6 °C (97.8 °F) (Temporal)   Resp 28   Ht 0.82 m (2' 8.28\")   Wt 9.66 kg (21 lb 4.7 oz)   BMI 14.37 kg/m²   Length - 98 %ile (Z= 2.02) based on WHO (Girls, 0-2 years) Length-for-age data based on Length recorded on 2/3/2020.  Weight - 58 %ile (Z= 0.20) based on WHO (Girls, 0-2 years) weight-for-age data using vitals from 2/3/2020.      Physical Exam   Constitutional: She appears well-developed and well-nourished. She is active. No distress.   HENT:   Head: Atraumatic.   Right Ear: Tympanic membrane normal.   Left Ear: Tympanic membrane normal.   Nose: Nose normal. No nasal discharge.   Mouth/Throat: Mucous membranes are moist. Dentition is normal. No tonsillar exudate. Oropharynx is clear. " Pharynx is normal.   Eyes: Pupils are equal, round, and reactive to light. Conjunctivae and EOM are normal. Right eye exhibits no discharge. Left eye exhibits no discharge.   Neck: Normal range of motion. Neck supple. No neck adenopathy.   No cervical or axillary nodes palpable   Cardiovascular: Normal rate, regular rhythm, S1 normal and S2 normal. Pulses are palpable.   No murmur heard.  Pulmonary/Chest: Effort normal and breath sounds normal. No nasal flaring. No respiratory distress. She has no wheezes. She has no rhonchi. She has no rales. She exhibits no retraction.   Abdominal: Soft. Bowel sounds are normal. She exhibits no distension. There is no hepatosplenomegaly. There is no tenderness. There is no guarding.   Genitourinary:    No vaginal erythema.   No erythema in the vagina.    Genitourinary Comments: Labial adhesion from vaginal orifice to urethra; readily able to visualize urethral meatus     Musculoskeletal: Normal range of motion.         General: No edema.      Comments: Small well healing abrasion on rt forearm; no induration  No lymphatic streaking   Neurological: She is alert. She exhibits normal muscle tone.   Skin: Skin is warm and dry. Capillary refill takes less than 3 seconds. No petechiae and no rash noted. No pallor.   Nursing note and vitals reviewed.        ASSESSMENT and PLAN:   1. Acquired labial adhesion  - betamethasone dipropionate (DIPROLENE) 0.05 % Ointment; Apply to area twice daily  Dispense: 1 Tube; Refill: 3    2. Follow up    3. Acute viral syndrome    4. Cat scratch of forearm, subsequent encounter    Labial adhesion: apply BID x 4-6wks until resolution; will recheck at upcoming [facility] at 15mo    Cont augmentin as improving w/o e/o spread of infection or cellulitis today.    Likely viral syndrome-- supportive care and RTC guidelines d/w mom    Given other ED records, 1 viral illness / month since is nl for child this age and reassuring-- unlikely recurrent, undiagnosed UTI 2/2  adhesion.    Patient was seen for 25 minutes face to face of which > 50% of appointment time was spent on counseling and coordination of care regarding the above.       Female

## 2022-06-14 NOTE — OB PROVIDER H&P - HISTORY OF PRESENT ILLNESS
HPI: Pt is a 33yo  @41.0 wks  presenting as a late term induction. Patient endorses positive fetal movement, denies LOF/VB. Denies contraction pain. GBS negative, Rh negative- received Rhogam on 3/16/22. Patient accepts blood products. Covid negative in HIE.  EFW: 3625g    PNC uncomplicated thus far    OBHx: denies  GynHx: denies hx of fibroids, abnormal Pap smears or STDs. Hx of ovarian cystectomy patient unaware of procedure date.  PMHx: Multiple sclerosis taking IVIG  PSHx: denies  Med: PNV  All: Sulfa- rash  Psych: hx of depression and anxiety, no meds  SH: denies hx of smoking, drinking, or drug usage during the pregnancy    VS  T(C): 37.1 (22 @ 17:19)  HR: 92 (22 @ 17:21)  BP: 118/76 (22 @ 17:21)  RR: --  SpO2: --    Physical Exam:  Gen: NAD, AOx3  CV: RRR  Resp: CTAB  Abd: soft, NT, gravid    Speculum Exam: deferred    SVE: 0/50/-3  FHT: Category 1  [place]: irregular   EFW: 3625g  Sono: cephalic presentation       A/P: Pt is a 33yo  @41.0 wks who presents for a late term induction of labor  - no prenatal issues, GBS negative     1. Admit to LND. Routine Labs. IVF  2.  IOL with buccal cytotec  3. Fetus: Cat 1 tracing, Vertex, EFW 3625g. C/w EFM.  4. GBS negative  6. Pain: IV pain meds/epidural PRN  7. Covid negative in HIE     Chago NP  Discussed with        HPI: Pt is a 31yo  @41.0 wks  presenting as a late term induction. Patient endorses positive fetal movement, denies LOF/VB. Denies contraction pain. GBS negative, Rh negative- received Rhogam on 3/16/22. Patient accepts blood products. Covid negative in HIE.  EFW: 3625g    PNC uncomplicated thus far    OBHx: denies  GynHx: denies hx of fibroids, abnormal Pap smears or STDs. Hx of ovarian cystectomy patient unaware of procedure date.  PMHx: Multiple sclerosis dx in 2018 taking IVIG during pregnancy, patient unaware of dosage   PSHx: denies  Med: PNV  All: Sulfa- rash  Psych: hx of depression and anxiety, no meds  SH: denies hx of smoking, drinking, or drug usage during the pregnancy    VS  T(C): 37.1 (22 @ 17:19)  HR: 92 (22 @ 17:21)  BP: 118/76 (22 @ 17:21)  RR: --  SpO2: --    Physical Exam:  Gen: NAD, AOx3  CV: RRR  Resp: CTAB  Abd: soft, NT, gravid    Speculum Exam: deferred    SVE: 0/50/-3  FHT: Category 1  [place]: irregular   EFW: 3625g  Sono: cephalic presentation       A/P: Pt is a 31yo  @41.0 wks who presents for a late term induction of labor  - no prenatal issues, GBS negative     1. Admit to LND. Routine Labs. IVF  2.  IOL with buccal cytotec  3. Fetus: Cat 1 tracing, Vertex, EFW 3625g. C/w EFM.  4. GBS negative  6. Pain: IV pain meds/epidural PRN  7. Covid negative in HIE     Chago MUNIZ  Discussed with        HPI: Pt is a 33yo  @41.0 wks  presenting as a late term induction. Patient endorses positive fetal movement, denies LOF/VB. Denies contraction pain. GBS negative, Rh negative- received Rhogam on 3/16/22. Patient accepts blood products. Covid negative in HIE.  EFW: 3625g    PNC uncomplicated thus far    OBHx: denies  GynHx: denies hx of fibroids, abnormal Pap smears or STDs. Hx of ovarian cystectomy 10 years ago  PMHx: Multiple sclerosis dx in 2019 post brain biopsy taking IVIG during pregnancy, patient unaware of dosage   PSHx: cystectomy, cholecystectomy 2018, brain biopsy 2019   Med: PNV  All: Sulfa- rash  Psych: hx of depression and anxiety, no meds  SH: denies hx of smoking, drinking, or drug usage during the pregnancy    VS  T(C): 37.1 (22 @ 17:19)  HR: 92 (22 @ 17:21)  BP: 118/76 (22 @ 17:21)  RR: --  SpO2: --    Physical Exam:  Gen: NAD, AOx3  CV: RRR  Resp: CTAB  Abd: soft, NT, gravid    Speculum Exam: deferred    SVE: 0/50/-3  FHT: Category 1  [place]: irregular   EFW: 3625g  Sono: cephalic presentation       A/P: Pt is a 33yo  @41.0 wks who presents for a late term induction of labor  - no prenatal issues, GBS negative     1. Admit to LND. Routine Labs. IVF  2.  IOL with buccal cytotec  3. Fetus: Cat 1 tracing, Vertex, EFW 3625g. C/w EFM.  4. GBS negative  6. Pain: IV pain meds/epidural PRN  7. Covid negative in HIE     Chago NP  Discussed with

## 2022-06-14 NOTE — OB RN PATIENT PROFILE - PRETERM DELIVERIES, OB PROFILE
Detail Level: Detailed Depth Of Biopsy: dermis Was A Bandage Applied: Yes Size Of Lesion In Cm: 0.8 X Size Of Lesion In Cm: 0 Biopsy Type: H and E Biopsy Method: 15 blade Anesthesia Type: 1% lidocaine with epinephrine Anesthesia Volume In Cc (Will Not Render If 0): 0.5 Hemostasis: Aluminum Chloride Wound Care: Bacitracin Dressing: bandage Destruction After The Procedure: No Type Of Destruction Used: Curettage Curettage Text: The wound bed was treated with curettage after the biopsy was performed. Cryotherapy Text: The wound bed was treated with cryotherapy after the biopsy was performed. Electrodesiccation Text: The wound bed was treated with electrodesiccation after the biopsy was performed. Electrodesiccation And Curettage Text: The wound bed was treated with electrodesiccation and curettage after the biopsy was performed. Silver Nitrate Text: The wound bed was treated with silver nitrate after the biopsy was performed. Lab: [facility] Consent: Written consent was obtained and risks were reviewed including but not limited to scarring, infection, bleeding, scabbing, incomplete removal, nerve damage and allergy to anesthesia. Post-Care Instructions: I reviewed with the patient in detail post-care instructions. Patient is to keep the biopsy site dry overnight, and then apply bacitracin twice daily until healed. Patient may apply hydrogen peroxide soaks to remove any crusting. Notification Instructions: Patient will be notified of biopsy results. However, patient instructed to call the office if not contacted within 2 weeks. Billing Type: [employer] Information: Selecting Yes will display possible errors in your note based on the variables you have selected. This validation is only offered as a suggestion for you. PLEASE NOTE THAT THE VALIDATION TEXT WILL BE REMOVED WHEN YOU FINALIZE YOUR NOTE. IF YOU WANT TO FAX A PRELIMINARY NOTE YOU WILL NEED TO TOGGLE THIS TO 'NO' IF YOU DO NOT WANT IT IN YOUR FAXED NOTE. 0

## 2022-06-14 NOTE — OB PROVIDER H&P - ASSESSMENT
A/P: Pt is a 33yo  @41.0 wks who presents for a late term induction of labor  - no prenatal issues, GBS negative     1. Admit to LND. Routine Labs. IVF  2.  IOL with buccal cytotec  3. Fetus: Cat 1 tracing, Vertex, EFW 3625g. C/w EFM.  4. GBS negative  6. Pain: IV pain meds/epidural PRN  7. Covid negative in TALI Anders NP  Discussed with

## 2022-06-15 DIAGNOSIS — O48.0 POST-TERM PREGNANCY: ICD-10-CM

## 2022-06-15 LAB
COVID-19 SPIKE DOMAIN AB INTERP: POSITIVE
COVID-19 SPIKE DOMAIN ANTIBODY RESULT: 80.7 U/ML — HIGH
SARS-COV-2 IGG+IGM SERPL QL IA: 80.7 U/ML — HIGH
SARS-COV-2 IGG+IGM SERPL QL IA: POSITIVE
SARS-COV-2 N GENE NPH QL NAA+PROBE: DETECTED
SARS-COV-2 RNA SPEC QL NAA+PROBE: SIGNIFICANT CHANGE UP
T PALLIDUM AB TITR SER: NEGATIVE — SIGNIFICANT CHANGE UP

## 2022-06-15 RX ORDER — LANOLIN
1 OINTMENT (GRAM) TOPICAL EVERY 6 HOURS
Refills: 0 | Status: DISCONTINUED | OUTPATIENT
Start: 2022-06-15 | End: 2022-06-17

## 2022-06-15 RX ORDER — HYDROCORTISONE 1 %
1 OINTMENT (GRAM) TOPICAL EVERY 6 HOURS
Refills: 0 | Status: DISCONTINUED | OUTPATIENT
Start: 2022-06-15 | End: 2022-06-17

## 2022-06-15 RX ORDER — OXYTOCIN 10 UNIT/ML
2 VIAL (ML) INJECTION
Qty: 30 | Refills: 0 | Status: DISCONTINUED | OUTPATIENT
Start: 2022-06-15 | End: 2022-06-15

## 2022-06-15 RX ORDER — SIMETHICONE 80 MG/1
80 TABLET, CHEWABLE ORAL EVERY 4 HOURS
Refills: 0 | Status: DISCONTINUED | OUTPATIENT
Start: 2022-06-15 | End: 2022-06-17

## 2022-06-15 RX ORDER — ACETAMINOPHEN 500 MG
975 TABLET ORAL
Refills: 0 | Status: DISCONTINUED | OUTPATIENT
Start: 2022-06-15 | End: 2022-06-17

## 2022-06-15 RX ORDER — MAGNESIUM HYDROXIDE 400 MG/1
30 TABLET, CHEWABLE ORAL
Refills: 0 | Status: DISCONTINUED | OUTPATIENT
Start: 2022-06-15 | End: 2022-06-17

## 2022-06-15 RX ORDER — KETOROLAC TROMETHAMINE 30 MG/ML
30 SYRINGE (ML) INJECTION ONCE
Refills: 0 | Status: DISCONTINUED | OUTPATIENT
Start: 2022-06-15 | End: 2022-06-15

## 2022-06-15 RX ORDER — DIPHENHYDRAMINE HCL 50 MG
25 CAPSULE ORAL EVERY 6 HOURS
Refills: 0 | Status: DISCONTINUED | OUTPATIENT
Start: 2022-06-15 | End: 2022-06-17

## 2022-06-15 RX ORDER — OXYCODONE HYDROCHLORIDE 5 MG/1
5 TABLET ORAL
Refills: 0 | Status: DISCONTINUED | OUTPATIENT
Start: 2022-06-15 | End: 2022-06-17

## 2022-06-15 RX ORDER — DIBUCAINE 1 %
1 OINTMENT (GRAM) RECTAL EVERY 6 HOURS
Refills: 0 | Status: DISCONTINUED | OUTPATIENT
Start: 2022-06-15 | End: 2022-06-17

## 2022-06-15 RX ORDER — PRAMOXINE HYDROCHLORIDE 150 MG/15G
1 AEROSOL, FOAM RECTAL EVERY 4 HOURS
Refills: 0 | Status: DISCONTINUED | OUTPATIENT
Start: 2022-06-15 | End: 2022-06-17

## 2022-06-15 RX ORDER — OXYCODONE HYDROCHLORIDE 5 MG/1
5 TABLET ORAL ONCE
Refills: 0 | Status: DISCONTINUED | OUTPATIENT
Start: 2022-06-15 | End: 2022-06-17

## 2022-06-15 RX ORDER — IBUPROFEN 200 MG
600 TABLET ORAL EVERY 6 HOURS
Refills: 0 | Status: COMPLETED | OUTPATIENT
Start: 2022-06-15 | End: 2023-05-14

## 2022-06-15 RX ORDER — AER TRAVELER 0.5 G/1
1 SOLUTION RECTAL; TOPICAL EVERY 4 HOURS
Refills: 0 | Status: DISCONTINUED | OUTPATIENT
Start: 2022-06-15 | End: 2022-06-17

## 2022-06-15 RX ORDER — SODIUM CHLORIDE 9 MG/ML
3 INJECTION INTRAMUSCULAR; INTRAVENOUS; SUBCUTANEOUS EVERY 8 HOURS
Refills: 0 | Status: DISCONTINUED | OUTPATIENT
Start: 2022-06-15 | End: 2022-06-17

## 2022-06-15 RX ORDER — IBUPROFEN 200 MG
600 TABLET ORAL EVERY 6 HOURS
Refills: 0 | Status: DISCONTINUED | OUTPATIENT
Start: 2022-06-15 | End: 2022-06-17

## 2022-06-15 RX ORDER — TETANUS TOXOID, REDUCED DIPHTHERIA TOXOID AND ACELLULAR PERTUSSIS VACCINE, ADSORBED 5; 2.5; 8; 8; 2.5 [IU]/.5ML; [IU]/.5ML; UG/.5ML; UG/.5ML; UG/.5ML
0.5 SUSPENSION INTRAMUSCULAR ONCE
Refills: 0 | Status: DISCONTINUED | OUTPATIENT
Start: 2022-06-15 | End: 2022-06-17

## 2022-06-15 RX ORDER — OXYTOCIN 10 UNIT/ML
333.33 VIAL (ML) INJECTION
Qty: 20 | Refills: 0 | Status: DISCONTINUED | OUTPATIENT
Start: 2022-06-15 | End: 2022-06-15

## 2022-06-15 RX ORDER — BENZOCAINE 10 %
1 GEL (GRAM) MUCOUS MEMBRANE EVERY 6 HOURS
Refills: 0 | Status: DISCONTINUED | OUTPATIENT
Start: 2022-06-15 | End: 2022-06-17

## 2022-06-15 RX ADMIN — Medication 2 MILLIUNIT(S)/MIN: at 09:43

## 2022-06-15 RX ADMIN — SODIUM CHLORIDE 125 MILLILITER(S): 9 INJECTION, SOLUTION INTRAVENOUS at 09:48

## 2022-06-15 RX ADMIN — Medication 975 MILLIGRAM(S): at 22:00

## 2022-06-15 RX ADMIN — Medication 975 MILLIGRAM(S): at 21:00

## 2022-06-15 RX ADMIN — SODIUM CHLORIDE 3 MILLILITER(S): 9 INJECTION INTRAMUSCULAR; INTRAVENOUS; SUBCUTANEOUS at 22:00

## 2022-06-15 NOTE — OB RN DELIVERY SUMMARY - NSSELHIDDEN_OBGYN_ALL_OB_FT
[NS_DeliveryAttending1_OBGYN_ALL_OB_FT:[id number]],[NS_DeliveryAssist1_OBGYN_ALL_OB_FT:[id number]],[NS_DeliveryRN_OBGYN_ALL_OB_FT:[id number]==]

## 2022-06-15 NOTE — OB RN DELIVERY SUMMARY - NS_INTRAPARTUMABXTYPE_OBGYN_ALL_OB
No antibiotics or any antibiotics < 2 hrs prior to birth Patient called and would like to set up an apt she said a good time to contact her is anytime before 12:00  Can you please give her a call thank you

## 2022-06-15 NOTE — CHART NOTE - NSCHARTNOTEFT_GEN_A_CORE
Pt states she sees neurologist Dr. Zoran Shepherd ([phone]). She took Ocrevus (ocrelizumab) prior to pregnancy and has been receiving IVIG (thinks 30 mg) every 2 weeks during pregnancy. She had no MS flares this pregnancy. She has close follow-up with Dr. Shepherd postpartum and states she believes she will be restarting IVIG therapy when she is discharged.

## 2022-06-15 NOTE — CHART NOTE - NSCHARTNOTEFT_GEN_A_CORE
PA note    seen & examined for cont of management  comfortable    VS  T(C): 36.8 (06-15-22 @ 05:55)  HR: 79 (06-14-22 @ 21:31)  BP: 126/68 (06-14-22 @ 21:31)  RR: 12 (06-14-22 @ 18:19)  SpO2: --    /mod pee/+accels/no decels  [place] q 3-4min  2.5/80/-2    cont efm/toco  dr lott at bedside discussing plan of care  marco antonio wallace

## 2022-06-15 NOTE — OB PROVIDER DELIVERY SUMMARY - NSPROVIDERDELIVERYNOTE_OBGYN_ALL_OB_FT
Spontaneous vaginal delivery of liveborn infant from VINCENT position. Head delivered with loose CANx1, which was reducible. Shoulders and body delivered easily. Infant was suctioned. Terminal meconium noted. Cord was clamped and cut and infant was passed to mother. Placenta delivered intact with a 3 vessel cord. Fundal massage was given and uterine fundus was found to be firm. Vaginal exam revealed an intact cervix, vaginal walls and sulci. Patient had a 1st degree laceration in the perineum that was repaired with 2.0 chromic suture. Excellent hemostasis was noted. Count was correct x 2.     QBL: 207cc  Apgars: 8/9    Attending: Dr. Pratt  Assistant: Chago, NP

## 2022-06-15 NOTE — OB PROVIDER DELIVERY SUMMARY - NSSELHIDDEN_OBGYN_ALL_OB_FT
[NS_DeliveryAttending1_OBGYN_ALL_OB_FT:[id number]=],[NS_DeliveryAssist1_OBGYN_ALL_OB_FT:[id number]]

## 2022-06-15 NOTE — OB RN DELIVERY SUMMARY - NS_SEPSISRSKCALC_OBGYN_ALL_OB_FT
EOS calculated successfully. EOS Risk Factor: 0.5/1000 live births ([facility] national incidence); GA=41w1d; Temp=98.8; ROM=6.683; GBS='Negative'; Antibiotics='No antibiotics or any antibiotics < 2 hrs prior to birth'

## 2022-06-16 ENCOUNTER — TRANSCRIPTION ENCOUNTER (OUTPATIENT)
Age: 32
End: 2022-06-16

## 2022-06-16 LAB — KLEIHAUER-BETKE CALCULATION: 0 % — SIGNIFICANT CHANGE UP

## 2022-06-16 RX ORDER — IBUPROFEN 200 MG
1 TABLET ORAL
Qty: 0 | Refills: 0 | DISCHARGE
Start: 2022-06-16

## 2022-06-16 RX ORDER — ACETAMINOPHEN 500 MG
3 TABLET ORAL
Qty: 0 | Refills: 0 | DISCHARGE
Start: 2022-06-16

## 2022-06-16 RX ADMIN — SODIUM CHLORIDE 3 MILLILITER(S): 9 INJECTION INTRAMUSCULAR; INTRAVENOUS; SUBCUTANEOUS at 22:00

## 2022-06-16 RX ADMIN — SODIUM CHLORIDE 3 MILLILITER(S): 9 INJECTION INTRAMUSCULAR; INTRAVENOUS; SUBCUTANEOUS at 06:00

## 2022-06-16 RX ADMIN — Medication 1 TABLET(S): at 16:56

## 2022-06-16 RX ADMIN — SODIUM CHLORIDE 3 MILLILITER(S): 9 INJECTION INTRAMUSCULAR; INTRAVENOUS; SUBCUTANEOUS at 14:00

## 2022-06-16 NOTE — DISCHARGE NOTE OB - BREAST MILK SUPPORTS STABLE NEWBORN BLOOD SUGAR
Is This A New Presentation, Or A Follow-Up?: Follow Up [employer] How Severe Is It?: moderate Statement Selected

## 2022-06-16 NOTE — DISCHARGE NOTE OB - CARE PLAN
1 Principal Discharge DX:	Vaginal delivery  Assessment and plan of treatment:	Reg diet, OOB, Analgesia PRN, pelvic rest

## 2022-06-16 NOTE — DISCHARGE NOTE OB - MATERIALS PROVIDED
Vaccinations/[facility]  Screening Program/  Immunization Record/Breastfeeding Mother’s Support Group Information/Guide to Postpartum Care/[facility] Hearing Screen Program/Back To Sleep Handout/Shaken Baby Prevention Handout/Birth Certificate Instructions/Tdap Vaccination (VIS Pub Date: 2012)

## 2022-06-16 NOTE — DISCHARGE NOTE OB - CARE PROVIDER_API CALL
Harvey Pratt)  MaternalFetal Medicine; Obstetrics and Gynecology  [address] [place] [address]  Phone: [phone]  Fax: [phone]  Follow Up Time:

## 2022-06-16 NOTE — DISCHARGE NOTE OB - PATIENT PORTAL LINK FT
You can access the FollowMyHealth Patient Portal offered by [facility] by registering at the following website: [email or web address]. By joining [employer]’s FollowMyHealth portal, you will also be able to view your health information using other applications (apps) compatible with our system.

## 2022-06-16 NOTE — PROGRESS NOTE ADULT - SUBJECTIVE AND OBJECTIVE BOX
Subjective  Pain: well controlled  Complaints: none  Milestones: Alert and orientedx3. Out of bed ambulating. Voiding/Due to void. Tolerating a regular diet.  Infant feeding:     breast                            Feeding related issues or concerns: none  Objective   T(C): 36.9 (06-16-22 @ 06:16), Max: 37.2 (06-15-22 @ 21:37)  HR: 68 (06-16-22 @ 06:16) (61 - 149)  BP: 101/74 (06-16-22 @ 06:16) (101/74 - 133/75)  RR: 19 (06-16-22 @ 06:16) (18 - 19)  SpO2: 99% (06-16-22 @ 06:16) (64% - 100%)  Wt(kg): --      Assessment      33 y/o G1  P 1 .Day # 1 postpartum.   Assisted delivery (vacuum, forceps):  Indication for assisted delivery:  Past medical history significant for depression, MS  Current Issues: none  Breasts: soft  Abdomen: Soft, nontender, nondistended.  Vagina: Lochia moderate  Perineum:  1st degree laceration,   Laceration/episiotomy site: clean  Lower extremities: No edema noted bilaterally of lower extremities. Nontender calves.  Negative Marisa's sign. Positive pedal pulses.  Other relevant physical exam findings; none      Plan  Plan: Increase ambulation, analgesia PRN and pain medication protocal standing oxycodone, ibuprofen and acetaminophen.  Diet: Continue regular diet  social work consult  Continue routine postpartum care.

## 2022-06-17 VITALS
TEMPERATURE: 98 F | HEART RATE: 74 BPM | DIASTOLIC BLOOD PRESSURE: 65 MMHG | SYSTOLIC BLOOD PRESSURE: 110 MMHG | RESPIRATION RATE: 17 BRPM | OXYGEN SATURATION: 100 %

## 2022-06-17 RX ADMIN — SODIUM CHLORIDE 3 MILLILITER(S): 9 INJECTION INTRAMUSCULAR; INTRAVENOUS; SUBCUTANEOUS at 06:00

## 2022-06-21 PROBLEM — G35 MULTIPLE SCLEROSIS: Chronic | Status: ACTIVE | Noted: 2022-06-14

## 2022-06-27 ENCOUNTER — NON-APPOINTMENT (OUTPATIENT)
Age: 32
End: 2022-06-27

## 2022-07-27 ENCOUNTER — APPOINTMENT (OUTPATIENT)
Dept: OBGYN | Facility: CLINIC | Age: 32
End: 2022-07-27

## 2022-07-27 VITALS — SYSTOLIC BLOOD PRESSURE: 127 MMHG | BODY MASS INDEX: 23.52 KG/M2 | DIASTOLIC BLOOD PRESSURE: 78 MMHG | WEIGHT: 137 LBS

## 2022-07-27 DIAGNOSIS — Z3A.36 36 WEEKS GESTATION OF PREGNANCY: ICD-10-CM

## 2022-07-27 DIAGNOSIS — Z33.1 PREGNANT STATE, INCIDENTAL: ICD-10-CM

## 2022-07-27 PROCEDURE — 0503F POSTPARTUM CARE VISIT: CPT

## 2022-07-27 RX ORDER — NORETHINDRONE 0.35 MG/1
0.35 TABLET ORAL
Qty: 3 | Refills: 3 | Status: ACTIVE | COMMUNITY
Start: 2022-07-27 | End: 1900-01-01

## 2022-07-27 NOTE — HISTORY OF PRESENT ILLNESS
[Delivery Date: ___] : on [unfilled] [] : delivered by vaginal delivery [Male] : Delivery History: baby boy [Wt. ___] : weighing [unfilled] [Breastfeeding] : currently nursing [Back to Normal] : is back to normal in size [Normal] : the vagina was normal [Healing Well] : is healing well [Examination Of The Breasts] : breasts are normal [Doing Well] : is doing well [No Sign of Infection] : is showing no signs of infection [Excellent Pain Control] : has excellent pain control [None] : None [BF with Difficulty] : nursing without difficulty [FreeTextEntry8] : post partum visit [FreeTextEntry9] : uncomplicated [de-identified] : 41 weeks [de-identified] : breast and bottle [FreeTextEntry1] : 33 y/o F presenting for 6 week PP visit\par -normal PP exam\par -Patient cleared to resume intercourse and exercise\par -Patient counseled on contraception options, desires Briana. RX sent to pharmacy\par -f/u for 3 months for annual\par

## 2022-10-26 ENCOUNTER — APPOINTMENT (OUTPATIENT)
Dept: OBGYN | Facility: CLINIC | Age: 32
End: 2022-10-26

## 2022-11-01 ENCOUNTER — APPOINTMENT (OUTPATIENT)
Dept: OBGYN | Facility: CLINIC | Age: 32
End: 2022-11-01

## 2022-11-01 ENCOUNTER — EMERGENCY (EMERGENCY)
Facility: HOSPITAL | Age: 32
LOS: 1 days | Discharge: HOME | End: 2022-11-01
Attending: EMERGENCY MEDICINE | Admitting: EMERGENCY MEDICINE
Payer: COMMERCIAL

## 2022-11-01 ENCOUNTER — NON-APPOINTMENT (OUTPATIENT)
Age: 32
End: 2022-11-01

## 2022-11-01 VITALS — DIASTOLIC BLOOD PRESSURE: 74 MMHG | HEART RATE: 98 BPM | SYSTOLIC BLOOD PRESSURE: 120 MMHG | HEIGHT: 64 IN

## 2022-11-01 VITALS
HEART RATE: 89 BPM | RESPIRATION RATE: 16 BRPM | TEMPERATURE: 98 F | OXYGEN SATURATION: 100 % | DIASTOLIC BLOOD PRESSURE: 54 MMHG | SYSTOLIC BLOOD PRESSURE: 122 MMHG

## 2022-11-01 DIAGNOSIS — R10.2 PELVIC AND PERINEAL PAIN: ICD-10-CM

## 2022-11-01 DIAGNOSIS — Z90.49 ACQUIRED ABSENCE OF OTHER SPECIFIED PARTS OF DIGESTIVE TRACT: Chronic | ICD-10-CM

## 2022-11-01 DIAGNOSIS — O28.3 ABNORMAL ULTRASONIC FINDING ON ANTENATAL SCREENING OF MOTHER: ICD-10-CM

## 2022-11-01 DIAGNOSIS — D27.1 BENIGN NEOPLASM OF LEFT OVARY: ICD-10-CM

## 2022-11-01 LAB
ALBUMIN SERPL ELPH-MCNC: 4.2 G/DL — SIGNIFICANT CHANGE UP (ref 3.3–5)
ALP SERPL-CCNC: 69 U/L — SIGNIFICANT CHANGE UP (ref 40–120)
ALT FLD-CCNC: 12 U/L — SIGNIFICANT CHANGE UP (ref 4–33)
ANION GAP SERPL CALC-SCNC: 11 MMOL/L — SIGNIFICANT CHANGE UP (ref 7–14)
APPEARANCE UR: CLEAR — SIGNIFICANT CHANGE UP
AST SERPL-CCNC: 19 U/L — SIGNIFICANT CHANGE UP (ref 4–32)
BACTERIA # UR AUTO: ABNORMAL
BASOPHILS # BLD AUTO: 0.02 K/UL — SIGNIFICANT CHANGE UP (ref 0–0.2)
BASOPHILS NFR BLD AUTO: 0.3 % — SIGNIFICANT CHANGE UP (ref 0–2)
BILIRUB SERPL-MCNC: 0.2 MG/DL — SIGNIFICANT CHANGE UP (ref 0.2–1.2)
BILIRUB UR-MCNC: NEGATIVE — SIGNIFICANT CHANGE UP
BUN SERPL-MCNC: 6 MG/DL — LOW (ref 7–23)
CALCIUM SERPL-MCNC: 8.7 MG/DL — SIGNIFICANT CHANGE UP (ref 8.4–10.5)
CHLORIDE SERPL-SCNC: 106 MMOL/L — SIGNIFICANT CHANGE UP (ref 98–107)
CO2 SERPL-SCNC: 22 MMOL/L — SIGNIFICANT CHANGE UP (ref 22–31)
COLOR SPEC: SIGNIFICANT CHANGE UP
CREAT SERPL-MCNC: 0.65 MG/DL — SIGNIFICANT CHANGE UP (ref 0.5–1.3)
DIFF PNL FLD: NEGATIVE — SIGNIFICANT CHANGE UP
EGFR: 121 ML/MIN/1.73M2 — SIGNIFICANT CHANGE UP
EOSINOPHIL # BLD AUTO: 0.15 K/UL — SIGNIFICANT CHANGE UP (ref 0–0.5)
EOSINOPHIL NFR BLD AUTO: 2.1 % — SIGNIFICANT CHANGE UP (ref 0–6)
EPI CELLS # UR: 5 /HPF — SIGNIFICANT CHANGE UP (ref 0–5)
GLUCOSE SERPL-MCNC: 88 MG/DL — SIGNIFICANT CHANGE UP (ref 70–99)
GLUCOSE UR QL: NEGATIVE — SIGNIFICANT CHANGE UP
HCG SERPL-ACNC: <5 MIU/ML — SIGNIFICANT CHANGE UP
HCT VFR BLD CALC: 40.7 % — SIGNIFICANT CHANGE UP (ref 34.5–45)
HGB BLD-MCNC: 13.4 G/DL — SIGNIFICANT CHANGE UP (ref 11.5–15.5)
IANC: 4.16 K/UL — SIGNIFICANT CHANGE UP (ref 1.8–7.4)
IMM GRANULOCYTES NFR BLD AUTO: 0.1 % — SIGNIFICANT CHANGE UP (ref 0–0.9)
KETONES UR-MCNC: ABNORMAL
LEUKOCYTE ESTERASE UR-ACNC: NEGATIVE — SIGNIFICANT CHANGE UP
LYMPHOCYTES # BLD AUTO: 2.26 K/UL — SIGNIFICANT CHANGE UP (ref 1–3.3)
LYMPHOCYTES # BLD AUTO: 31.8 % — SIGNIFICANT CHANGE UP (ref 13–44)
MCHC RBC-ENTMCNC: 28.5 PG — SIGNIFICANT CHANGE UP (ref 27–34)
MCHC RBC-ENTMCNC: 32.9 GM/DL — SIGNIFICANT CHANGE UP (ref 32–36)
MCV RBC AUTO: 86.6 FL — SIGNIFICANT CHANGE UP (ref 80–100)
MONOCYTES # BLD AUTO: 0.51 K/UL — SIGNIFICANT CHANGE UP (ref 0–0.9)
MONOCYTES NFR BLD AUTO: 7.2 % — SIGNIFICANT CHANGE UP (ref 2–14)
NEUTROPHILS # BLD AUTO: 4.16 K/UL — SIGNIFICANT CHANGE UP (ref 1.8–7.4)
NEUTROPHILS NFR BLD AUTO: 58.5 % — SIGNIFICANT CHANGE UP (ref 43–77)
NITRITE UR-MCNC: NEGATIVE — SIGNIFICANT CHANGE UP
NRBC # BLD: 0 /100 WBCS — SIGNIFICANT CHANGE UP (ref 0–0)
NRBC # FLD: 0 K/UL — SIGNIFICANT CHANGE UP (ref 0–0)
PH UR: 6.5 — SIGNIFICANT CHANGE UP (ref 5–8)
PLATELET # BLD AUTO: 313 K/UL — SIGNIFICANT CHANGE UP (ref 150–400)
POTASSIUM SERPL-MCNC: 3.5 MMOL/L — SIGNIFICANT CHANGE UP (ref 3.5–5.3)
POTASSIUM SERPL-SCNC: 3.5 MMOL/L — SIGNIFICANT CHANGE UP (ref 3.5–5.3)
PROT SERPL-MCNC: 7.6 G/DL — SIGNIFICANT CHANGE UP (ref 6–8.3)
PROT UR-MCNC: ABNORMAL
RBC # BLD: 4.7 M/UL — SIGNIFICANT CHANGE UP (ref 3.8–5.2)
RBC # FLD: 12.6 % — SIGNIFICANT CHANGE UP (ref 10.3–14.5)
RBC CASTS # UR COMP ASSIST: 2 /HPF — SIGNIFICANT CHANGE UP (ref 0–4)
SODIUM SERPL-SCNC: 139 MMOL/L — SIGNIFICANT CHANGE UP (ref 135–145)
SP GR SPEC: 1.03 — SIGNIFICANT CHANGE UP (ref 1.01–1.05)
UROBILINOGEN FLD QL: SIGNIFICANT CHANGE UP
WBC # BLD: 7.11 K/UL — SIGNIFICANT CHANGE UP (ref 3.8–10.5)
WBC # FLD AUTO: 7.11 K/UL — SIGNIFICANT CHANGE UP (ref 3.8–10.5)
WBC UR QL: 4 /HPF — SIGNIFICANT CHANGE UP (ref 0–5)

## 2022-11-01 PROCEDURE — 99213 OFFICE O/P EST LOW 20 MIN: CPT

## 2022-11-01 PROCEDURE — 76830 TRANSVAGINAL US NON-OB: CPT | Mod: 26

## 2022-11-01 PROCEDURE — 99283 EMERGENCY DEPT VISIT LOW MDM: CPT

## 2022-11-01 PROCEDURE — 99285 EMERGENCY DEPT VISIT HI MDM: CPT

## 2022-11-01 RX ORDER — SODIUM CHLORIDE 9 MG/ML
1000 INJECTION INTRAMUSCULAR; INTRAVENOUS; SUBCUTANEOUS ONCE
Refills: 0 | Status: COMPLETED | OUTPATIENT
Start: 2022-11-01 | End: 2022-11-01

## 2022-11-01 RX ORDER — KETOROLAC TROMETHAMINE 30 MG/ML
30 SYRINGE (ML) INJECTION ONCE
Refills: 0 | Status: DISCONTINUED | OUTPATIENT
Start: 2022-11-01 | End: 2022-11-01

## 2022-11-01 RX ORDER — ASCORBIC ACID, CHOLECALCIFEROL, .ALPHA.-TOCOPHEROL ACETATE, DL-, PYRIDOXINE, FOLIC ACID, CYANOCOBALAMIN, CALCIUM, FERROUS FUMARATE, MAGNESIUM, DOCONEXENT 85; 200; 10; 25; 1; 12; 140; 27; 45; 300 [IU]/1; [IU]/1; [IU]/1; [IU]/1; MG/1; UG/1; MG/1; MG/1; MG/1; MG/1
27-0.6-0.4-3 CAPSULE, GELATIN COATED ORAL
Qty: 30 | Refills: 9 | Status: DISCONTINUED | COMMUNITY
Start: 2021-10-25 | End: 2022-11-01

## 2022-11-01 RX ADMIN — SODIUM CHLORIDE 1000 MILLILITER(S): 9 INJECTION INTRAMUSCULAR; INTRAVENOUS; SUBCUTANEOUS at 21:06

## 2022-11-01 RX ADMIN — Medication 30 MILLIGRAM(S): at 21:06

## 2022-11-01 NOTE — ED ADULT NURSE NOTE - NSFALLRSKOUTCOME_ED_ALL_ED
I have reviewed discharge instructions with the patient. The patient verbalized understanding. Patient armband removed and given to patient to take home. Patient was informed of the privacy risks if armband lost or stolen. Pt alert, oriented x4 and ambulatory out of ER in [facility] at this time. VSS. Universal Safety Interventions

## 2022-11-01 NOTE — CONSULT NOTE ADULT - ATTENDING COMMENTS
Pt seen and examined and agree with above  Large dermoid cyst with  no evidence of torsion at this time, recommend f/u outpatient [facility]IGS to schedule laparoscopic cystectomy  Ingrid Silvestre MD

## 2022-11-01 NOTE — ED PROVIDER NOTE - PHYSICAL EXAMINATION
Vital signs reviewed.   CONSTITUTIONAL: Well-appearing; well-nourished; in no apparent distress. Non-toxic appearing.   HEAD: Normocephalic, atraumatic.  EYES: PERRL, EOM intact, conjunctiva and sclera WNL.  ENT: normal nose; no rhinorrhea  NECK/LYMPH: Supple; non-tender;  CARD: Normal S1, S2; no murmurs  RESP: Normal chest excursion with respiration; breath sounds clear and equal bilaterally; no wheezes, rhonchi, or rales.  ABD/GI: soft, non-distended; non-tender; NCVAT b/l.  exam performed with ABIGAIL [place]. Normal physiologic discharge, OS closed, No blood. No masses or lesions. No CMT. Mild Lt adnexal tenderness. No rt adnexal tenderness.   EXT/MS: moves all extremities;   SKIN: Normal for age and race  NEURO: Awake, alert, oriented x 3, no gross deficits  PSYCH: Normal mood; appropriate affect.

## 2022-11-01 NOTE — ED PROVIDER NOTE - PROGRESS NOTE DETAILS
PA Toro- Pt TVUS showed 8.6 cm Lt ovarian cyst. OBGYN was consulted. They evaluated patient and state low suspicion for torsion based off their exam and US. State patient can go home and f/u outpatient with surgeon to plan for cyst removal. Strict return instructions given. All questions and concerns addressed. No complaints noted. Pts family member is an OBGYN attending and I was told he will assist with patients follow up.

## 2022-11-01 NOTE — CONSULT NOTE ADULT - ASSESSMENT
32 year old  LMP 10/25 presenting with left sided pelvic pain x2 days. History of ovarian cyst s/p cystectomy in childhood. Dermoid visualized on previous outpatient sono on left ovary. No WBC, H+H wnl. TVUS showing  ****. Abdominal exam benign, no rebound, no guarding, no pain to palpation.      - f/u TVUS     Erika Alanis, PGY2 32 year old  LMP 10/25 presenting with left sided pelvic pain x2 days. History of ovarian cyst s/p cystectomy in childhood. Dermoid visualized on previous outpatient sono on left ovary. No WBC, H+H wnl. TVUS showing  left adnexal dermoid measuring 8.6 cm increased in sized compared to 2019. Flow visualized to both ovaries. Abdominal exam benign, no rebound, no guarding, no pain to palpation.  Given size of cyst less possibility of intermittent torsion.     - No acute GYN intervention   - Low suspicion of torsion at this time  - Recommend outpatient follow up for dermoid cyst removal measuring 8.6cm. Recommend follow up with Dr. Miguel AMADO surgeon. Private attendings to work on coordination of appt.  - Recommend Motrin 600 mg q6h, Tylenol 1000 mg q6h, and heat packs for analgesia       Erika Alanis, PGY2  Dr. Silvestre at bedside

## 2022-11-01 NOTE — ED PROVIDER NOTE - PATIENT PORTAL LINK FT
You can access the FollowMyHealth Patient Portal offered by [facility] by registering at the following website: [email or web address]. By joining [employer]’s FollowMyHealth portal, you will also be able to view your health information using other applications (apps) compatible with our system.

## 2022-11-01 NOTE — CONSULT NOTE ADULT - SUBJECTIVE AND OBJECTIVE BOX
DARIO HENRIQUEZ  32y  Female [id number]    HPI:  32 year old  LMP 10/25 presenting with left sided pelvic pain x2 days. Pt states that she began to have dull cramping pain on her left side. Pt states that at that time the pain was 7/10 that lasted an hour or so. Pt states pain has since decreased, but every now and then she feels pain. Pt states the pain is very little when she is laying down, feels increased pain when walking. Pt took Tylenol and Motrin at home without relief. Pt states that using a heat pack eliminated the pain. Pt received Toradol in ED at 9pm with improvement in the pain. Pt states she felt a bit nauseous earlier in the morning, denies vomiting. Denies fevers, chills. Denies dysuria, frequency, urgency.     Last ate at 4pm 22.       Name of GYN Physician: Dr. Pratt     Past OB:   2022    Past gyn: Regular menses, history of ovarian cysts. Denies fibroids, endometriosis, STI's, Abnormal pap smears     Home meds: none     Allergies: sulfa drugs - hives     PAST MEDICAL & SURGICAL HISTORY:  - Anxiety  - Multiple sclerosis  - LSC cholecystectomy ()  - Brain biopsy (2019)  - LSC cystectomy at age 15 for ovarian cyst, unsure which side     Social History:  Denies smoking use, drug use, alcohol use.     Vital Signs Last 24 Hrs  T(C): 36.7 (2022 18:22), Max: 36.7 (2022 18:22)  T(F): 98 (2022 18:22), Max: 98 (2022 18:22)  HR: 89 (2022 18:22) (89 - 89)  BP: 122/54 (2022 18:22) (122/54 - 122/54)  BP(mean): --  RR: 16 (2022 18:22) (16 - 16)  SpO2: 100% (2022 18:22) (100% - 100%)    Parameters below as of 2022 18:22  Patient On (Oxygen Delivery Method): room air        Physical Exam:   General: sitting comfortably in bed, NAD   Abd: Soft, no rebound, no guarding, no tenderness to palpation of LLQ or RLQ, non-distended.   :  No bleeding on pad.    External labia wnl.  Bimanual exam with no cervical motion tenderness, uterus wnl, adnexa non palpable b/l.  Cervix closed.   Speculum Exam: No active bleeding from os.  Physiologic discharge.    Ext: non-tender b/l, no edema     LABS:                              13.4   7.11  )-----------( 313      ( 2022 21:07 )             40.7         139  |  106  |  6<L>  ----------------------------<  88  3.5   |  22  |  0.65    Ca    8.7      2022 21:07    TPro  7.6  /  Alb  4.2  /  TBili  0.2  /  DBili  x   /  AST  19  /  ALT  12  /  AlkPhos  69      I&O's Detail      Urinalysis Basic - ( 2022 21:09 )    Color: Light Yellow / Appearance: Clear / S.027 / pH: x  Gluc: x / Ketone: Trace  / Bili: Negative / Urobili: <2 mg/dL   Blood: x / Protein: 30 mg/dL / Nitrite: Negative   Leuk Esterase: Negative / RBC: 2 /HPF / WBC 4 /HPF   Sq Epi: x / Non Sq Epi: 5 /HPF / Bacteria: Few        RADIOLOGY & ADDITIONAL STUDIES:     VIVIENNE HENRIQUEZ  32y  Female [id number]    HPI:  32 year old  LMP 10/25 presenting with left sided pelvic pain x2 days. Pt states that she began to have dull cramping pain on her left side. Pt states that at that time the pain was 7/10 that lasted an hour or so. Pt states pain has since decreased, but every now and then she feels pain. Pt states the pain is very little when she is laying down, feels increased pain when walking. Pt took Tylenol and Motrin at home without relief. Pt states that using a heat pack eliminated the pain. Pt received Toradol in ED at 9pm with improvement in the pain. Pt states she felt a bit nauseous earlier in the morning, denies vomiting. Denies fevers, chills. Denies dysuria, frequency, urgency.     Last ate at 4pm 22.       Name of GYN Physician: Dr. Pratt for pregnancy. Pt saw Vivienne Sim NP today for the pain.     Past OB:   2022    Past gyn: Regular menses, history of ovarian cysts. Denies fibroids, endometriosis, STI's, Abnormal pap smears     Home meds: none     Allergies: sulfa drugs - hives     PAST MEDICAL & SURGICAL HISTORY:  - Anxiety  - Multiple sclerosis  - LSC cholecystectomy ()  - Brain biopsy (2019)  - LSC cystectomy at age 15 for ovarian cyst, unsure which side     Social History:  Denies smoking use, drug use, alcohol use.     Vital Signs Last 24 Hrs  T(C): 36.7 (2022 18:22), Max: 36.7 (2022 18:22)  T(F): 98 (2022 18:22), Max: 98 (2022 18:22)  HR: 89 (2022 18:22) (89 - 89)  BP: 122/54 (2022 18:22) (122/54 - 122/54)  BP(mean): --  RR: 16 (2022 18:22) (16 - 16)  SpO2: 100% (2022 18:22) (100% - 100%)    Parameters below as of 2022 18:22  Patient On (Oxygen Delivery Method): room air        Physical Exam:   General: sitting comfortably in bed, NAD   Abd: Soft, no rebound, no guarding, no tenderness to palpation of LLQ or RLQ, non-distended.   :  No bleeding on pad.    External labia wnl.  Bimanual exam with no cervical motion tenderness, uterus wnl, adnexa non palpable b/l.  Cervix closed.   Speculum Exam: No active bleeding from os.  Physiologic discharge.    Ext: non-tender b/l, no edema     LABS:                              13.4   7.11  )-----------( 313      ( 2022 21:07 )             40.7         139  |  106  |  6<L>  ----------------------------<  88  3.5   |  22  |  0.65    Ca    8.7      2022 21:07    TPro  7.6  /  Alb  4.2  /  TBili  0.2  /  DBili  x   /  AST  19  /  ALT  12  /  AlkPhos  69      I&O's Detail      Urinalysis Basic - ( 2022 21:09 )    Color: Light Yellow / Appearance: Clear / S.027 / pH: x  Gluc: x / Ketone: Trace  / Bili: Negative / Urobili: <2 mg/dL   Blood: x / Protein: 30 mg/dL / Nitrite: Negative   Leuk Esterase: Negative / RBC: 2 /HPF / WBC 4 /HPF   Sq Epi: x / Non Sq Epi: 5 /HPF / Bacteria: Few    HCG Quantitative, Serum: <5.0    RADIOLOGY & ADDITIONAL STUDIES:    < from: US Transvaginal (22 @ 22:08) >  US TRANSVAGINAL                          PROCEDURE DATE:  2022          INTERPRETATION:  CLINICAL INFORMATION: Left adnexal tenderness    LMP: 10/26/2022    COMPARISON: CT abdomen/pelvis dated 4/15/2019    TECHNIQUE:  Endovaginal pelvic sonogram only. Color and Spectral Doppler was   performed.    FINDINGS:  Uterus: 9.0 cm x 3.5 cm x 5.4 cm. Within normal limits.  Endometrium: 4 mm. Within normal limits.    Right ovary: 4.1 cm x 1.9 cm. Within normal limits. Normal arterial and   venous waveforms.  Left ovary: The left ovary suboptimally visualized due to a 7.6 x 5.6 x   8.6 cm echogenic structure, characterized as a dermoid cyst on prior CT   abdomen/pelvis. The partially visualized ovary demonstrates arterial and   venous waveforms.    Fluid: None.    IMPRESSION:  Left adnexal dermoid measuring up to 8.6 cm which is increased in size   when compared to 4/15/2019. Flow is demonstrated within the partially   visualized left ovary. Intermittent torsion is not entirely excluded.    --- End of Report ---      < end of copied text >

## 2022-11-01 NOTE — ED ADULT TRIAGE NOTE - CHIEF COMPLAINT QUOTE
Pt h/o ovarian cysts, c/o pain to Lt pelvic accompanied with n/v, afebrile, denies dysuria/hematuria.

## 2022-11-01 NOTE — ED ADULT NURSE NOTE - OBJECTIVE STATEMENT
Pt received to intake room 5. Pt A&Ox4, ambulatory at baseline. Pmhx of MS, ovarian cyst, and ITP. Pt c/o L pelvic pain, n/v. Pt states pain feels similar to last ovarian cyst. Denies any bloody emesis, CP, SOB, dizziness. RR even and unlabored. Pt in NAD. IV access established with 20G to L AC, labs collected and sent as per orders. Family at bedside. Safety measures in place

## 2022-11-01 NOTE — ED PROVIDER NOTE - CARE PROVIDER_API CALL
Aramis Rivera  OBSTETRICS AND GYNECOLOGY  [address] [address] [place] [address]  Phone: [phone]  Fax: [phone]  Follow Up Time: 1-3 Days

## 2022-11-01 NOTE — ED PROVIDER NOTE - NSFOLLOWUPINSTRUCTIONS_ED_ALL_ED_FT
Patient advised to follow up with GYNECOLOGIST IN 1-2 DAYS  and told to return to the emergency department immediately for any new or concerning symptoms such as DIZZINESS, PASSING OUT, FEVERS, CHILLS, VOMITING, WORSENING PAIN OR ANY OTHER COMPLAINTS. Patient agrees with plan.    Rest, stay hydrated  Take motrin or tylenol as needed for pain  Can apply heating pack to area.     Advance activity as tolerated.  Continue all previously prescribed medications as directed unless otherwise instructed.  Follow up with your primary care physician in 48-72 hours- bring copies of your results.  Return to the ER for worsening or persistent symptoms, and/or ANY NEW OR CONCERNING SYMPTOMS. If you have issues obtaining follow up, please call: [phone] to obtain a doctor or specialist who takes your insurance in your area.  You may call [phone] to make an appointment with the internal medicine clinic.

## 2022-11-01 NOTE — ED PROVIDER NOTE - CLINICAL SUMMARY MEDICAL DECISION MAKING FREE TEXT BOX
Patient is a 32 y.o female with PMHx of dermoid cyst, ITP, Anxiety, Hx of cyst removal at age 14,  currently 4 month Postpartum who presents to ED c/o Lt pelvic pain x 2 days.    DDx- ruptured cyst vs r/o pregnancy vs UTI. Plan for labs, TVUS, fluids, analgesic, pelvic exam. Will monitor and reassess

## 2022-11-01 NOTE — ED PROVIDER NOTE - ATTENDING APP SHARED VISIT CONTRIBUTION OF CARE
Attending Statement: I have reviewed and agree with all pertinent clinical information, including history and physical exam and agree with treatment plan of the PA, except as noted.  32 y.o female with PMHx of dermoid cyst, ITP, Anxiety, Hx of cyst removal at age 14,  currently 4 month Postpartum pw abdominal pain x2 days. Pain located in the lower left, on/off, non radiating. no fever. +nausea no vomiting. no vag bleeding no dysuria. Evaluated by GYN earlier today, had exam no US done. came to ED for continual pain.    Vital signs noted. nontoxic female. no resp difficulty soft nondistended mild lower mid/left tenderness. pelvic dw pa   plan labs, ua, TVUS, pain med, antiemetic, re asses

## 2022-11-01 NOTE — ED PROVIDER NOTE - OBJECTIVE STATEMENT
HPI- Patient is a 32 y.o female with PMHx of dermoid cyst, ITP, Anxiety, Hx of cyst removal at age 14,  currently 4 month Postpartum who presents to ED c/o Lt pelvic pain x 2 days. Pt states that she was having intermittent Lt side pelvic pain that became constant today. Pain currently 7/10. States she went to her GYN Dr. Sim today who did pelvic exam and told her that if pain worsened to go to ER. Pt admits to taking tylenol today around noon with little relief. Admits to +nausea. Pt denies fevers, chills, vaginal bleeding, discharge, cp, sob, back pain, dysuria, hematuria, weakness, diarrhea, melena, neck pain, or any other complaints. LMP 10/15.

## 2022-11-01 NOTE — ED PROVIDER NOTE - NS ED ATTENDING STATEMENT MOD
This was a shared visit with the ARSEN. I reviewed and verified the documentation and independently performed the documented:

## 2022-11-01 NOTE — PHYSICAL EXAM
[Chaperone Present] : A chaperone was present in the examining room during all aspects of the physical examination [Labia Majora] : normal [Labia Minora] : normal [No Bleeding] : There was no active vaginal bleeding [Normal] : normal [Tenderness] : nontender [Adnexa Tenderness On The Left] : tender

## 2022-11-02 ENCOUNTER — TRANSCRIPTION ENCOUNTER (OUTPATIENT)
Age: 32
End: 2022-11-02

## 2022-11-02 VITALS
RESPIRATION RATE: 17 BRPM | TEMPERATURE: 98 F | OXYGEN SATURATION: 100 % | DIASTOLIC BLOOD PRESSURE: 73 MMHG | HEART RATE: 62 BPM | SYSTOLIC BLOOD PRESSURE: 116 MMHG

## 2022-11-03 ENCOUNTER — TRANSCRIPTION ENCOUNTER (OUTPATIENT)
Age: 32
End: 2022-11-03

## 2022-11-03 ENCOUNTER — RESULT REVIEW (OUTPATIENT)
Age: 32
End: 2022-11-03

## 2022-11-03 ENCOUNTER — INPATIENT (INPATIENT)
Facility: HOSPITAL | Age: 32
LOS: 0 days | Discharge: HOME | End: 2022-11-03
Attending: OBSTETRICS & GYNECOLOGY | Admitting: OBSTETRICS & GYNECOLOGY
Payer: COMMERCIAL

## 2022-11-03 VITALS
OXYGEN SATURATION: 100 % | TEMPERATURE: 98 F | HEART RATE: 83 BPM | SYSTOLIC BLOOD PRESSURE: 118 MMHG | RESPIRATION RATE: 18 BRPM | DIASTOLIC BLOOD PRESSURE: 73 MMHG

## 2022-11-03 VITALS
HEART RATE: 84 BPM | RESPIRATION RATE: 16 BRPM | SYSTOLIC BLOOD PRESSURE: 123 MMHG | DIASTOLIC BLOOD PRESSURE: 74 MMHG | OXYGEN SATURATION: 98 %

## 2022-11-03 DIAGNOSIS — N83.209 UNSPECIFIED OVARIAN CYST, UNSPECIFIED SIDE: ICD-10-CM

## 2022-11-03 DIAGNOSIS — Z90.49 ACQUIRED ABSENCE OF OTHER SPECIFIED PARTS OF DIGESTIVE TRACT: Chronic | ICD-10-CM

## 2022-11-03 LAB
ALBUMIN SERPL ELPH-MCNC: 4.2 G/DL — SIGNIFICANT CHANGE UP (ref 3.3–5)
ALP SERPL-CCNC: 65 U/L — SIGNIFICANT CHANGE UP (ref 40–120)
ALT FLD-CCNC: 12 U/L — SIGNIFICANT CHANGE UP (ref 4–33)
ANION GAP SERPL CALC-SCNC: 11 MMOL/L — SIGNIFICANT CHANGE UP (ref 7–14)
APTT BLD: 28.5 SEC — SIGNIFICANT CHANGE UP (ref 27–36.3)
AST SERPL-CCNC: 24 U/L — SIGNIFICANT CHANGE UP (ref 4–32)
B PERT DNA SPEC QL NAA+PROBE: SIGNIFICANT CHANGE UP
B PERT+PARAPERT DNA PNL SPEC NAA+PROBE: SIGNIFICANT CHANGE UP
BASE EXCESS BLDV CALC-SCNC: 0.1 MMOL/L — SIGNIFICANT CHANGE UP (ref -2–3)
BASOPHILS # BLD AUTO: 0.02 K/UL — SIGNIFICANT CHANGE UP (ref 0–0.2)
BASOPHILS NFR BLD AUTO: 0.2 % — SIGNIFICANT CHANGE UP (ref 0–2)
BILIRUB SERPL-MCNC: <0.2 MG/DL — SIGNIFICANT CHANGE UP (ref 0.2–1.2)
BLD GP AB SCN SERPL QL: NEGATIVE — SIGNIFICANT CHANGE UP
BLOOD GAS VENOUS COMPREHENSIVE RESULT: SIGNIFICANT CHANGE UP
BORDETELLA PARAPERTUSSIS (RAPRVP): SIGNIFICANT CHANGE UP
BUN SERPL-MCNC: 6 MG/DL — LOW (ref 7–23)
C PNEUM DNA SPEC QL NAA+PROBE: SIGNIFICANT CHANGE UP
CALCIUM SERPL-MCNC: 8.8 MG/DL — SIGNIFICANT CHANGE UP (ref 8.4–10.5)
CHLORIDE BLDV-SCNC: 107 MMOL/L — SIGNIFICANT CHANGE UP (ref 96–108)
CHLORIDE SERPL-SCNC: 105 MMOL/L — SIGNIFICANT CHANGE UP (ref 98–107)
CO2 BLDV-SCNC: 26.7 MMOL/L — HIGH (ref 22–26)
CO2 SERPL-SCNC: 21 MMOL/L — LOW (ref 22–31)
CREAT SERPL-MCNC: 0.62 MG/DL — SIGNIFICANT CHANGE UP (ref 0.5–1.3)
CULTURE RESULTS: SIGNIFICANT CHANGE UP
EGFR: 123 ML/MIN/1.73M2 — SIGNIFICANT CHANGE UP
EOSINOPHIL # BLD AUTO: 0.2 K/UL — SIGNIFICANT CHANGE UP (ref 0–0.5)
EOSINOPHIL NFR BLD AUTO: 2.2 % — SIGNIFICANT CHANGE UP (ref 0–6)
FLUAV SUBTYP SPEC NAA+PROBE: SIGNIFICANT CHANGE UP
FLUBV RNA SPEC QL NAA+PROBE: SIGNIFICANT CHANGE UP
GAS PNL BLDV: 134 MMOL/L — LOW (ref 136–145)
GLUCOSE BLDV-MCNC: 88 MG/DL — SIGNIFICANT CHANGE UP (ref 70–99)
GLUCOSE SERPL-MCNC: 84 MG/DL — SIGNIFICANT CHANGE UP (ref 70–99)
HADV DNA SPEC QL NAA+PROBE: SIGNIFICANT CHANGE UP
HCG SERPL-ACNC: <5 MIU/ML — SIGNIFICANT CHANGE UP
HCO3 BLDV-SCNC: 25 MMOL/L — SIGNIFICANT CHANGE UP (ref 22–29)
HCOV 229E RNA SPEC QL NAA+PROBE: SIGNIFICANT CHANGE UP
HCOV HKU1 RNA SPEC QL NAA+PROBE: SIGNIFICANT CHANGE UP
HCOV NL63 RNA SPEC QL NAA+PROBE: SIGNIFICANT CHANGE UP
HCOV OC43 RNA SPEC QL NAA+PROBE: SIGNIFICANT CHANGE UP
HCT VFR BLD CALC: 38.7 % — SIGNIFICANT CHANGE UP (ref 34.5–45)
HCT VFR BLDA CALC: 38 % — SIGNIFICANT CHANGE UP (ref 34.5–46.5)
HGB BLD CALC-MCNC: 12.7 G/DL — SIGNIFICANT CHANGE UP (ref 11.5–15.5)
HGB BLD-MCNC: 12.3 G/DL — SIGNIFICANT CHANGE UP (ref 11.5–15.5)
HMPV RNA SPEC QL NAA+PROBE: SIGNIFICANT CHANGE UP
HPIV1 RNA SPEC QL NAA+PROBE: SIGNIFICANT CHANGE UP
HPIV2 RNA SPEC QL NAA+PROBE: SIGNIFICANT CHANGE UP
HPIV3 RNA SPEC QL NAA+PROBE: SIGNIFICANT CHANGE UP
HPIV4 RNA SPEC QL NAA+PROBE: SIGNIFICANT CHANGE UP
IANC: 6.43 K/UL — SIGNIFICANT CHANGE UP (ref 1.8–7.4)
IMM GRANULOCYTES NFR BLD AUTO: 0.2 % — SIGNIFICANT CHANGE UP (ref 0–0.9)
INR BLD: 1.12 RATIO — SIGNIFICANT CHANGE UP (ref 0.88–1.16)
LACTATE BLDV-MCNC: 0.7 MMOL/L — SIGNIFICANT CHANGE UP (ref 0.5–2)
LYMPHOCYTES # BLD AUTO: 1.6 K/UL — SIGNIFICANT CHANGE UP (ref 1–3.3)
LYMPHOCYTES # BLD AUTO: 18 % — SIGNIFICANT CHANGE UP (ref 13–44)
M PNEUMO DNA SPEC QL NAA+PROBE: SIGNIFICANT CHANGE UP
MCHC RBC-ENTMCNC: 28 PG — SIGNIFICANT CHANGE UP (ref 27–34)
MCHC RBC-ENTMCNC: 31.8 GM/DL — LOW (ref 32–36)
MCV RBC AUTO: 88 FL — SIGNIFICANT CHANGE UP (ref 80–100)
MONOCYTES # BLD AUTO: 0.63 K/UL — SIGNIFICANT CHANGE UP (ref 0–0.9)
MONOCYTES NFR BLD AUTO: 7.1 % — SIGNIFICANT CHANGE UP (ref 2–14)
NEUTROPHILS # BLD AUTO: 6.43 K/UL — SIGNIFICANT CHANGE UP (ref 1.8–7.4)
NEUTROPHILS NFR BLD AUTO: 72.3 % — SIGNIFICANT CHANGE UP (ref 43–77)
NRBC # BLD: 0 /100 WBCS — SIGNIFICANT CHANGE UP (ref 0–0)
NRBC # FLD: 0 K/UL — SIGNIFICANT CHANGE UP (ref 0–0)
PCO2 BLDV: 43 MMHG — HIGH (ref 39–42)
PH BLDV: 7.38 — SIGNIFICANT CHANGE UP (ref 7.32–7.43)
PLATELET # BLD AUTO: 258 K/UL — SIGNIFICANT CHANGE UP (ref 150–400)
PO2 BLDV: 43 MMHG — SIGNIFICANT CHANGE UP
POTASSIUM BLDV-SCNC: 3.8 MMOL/L — SIGNIFICANT CHANGE UP (ref 3.5–5.1)
POTASSIUM SERPL-MCNC: 4.7 MMOL/L — SIGNIFICANT CHANGE UP (ref 3.5–5.3)
POTASSIUM SERPL-SCNC: 4.7 MMOL/L — SIGNIFICANT CHANGE UP (ref 3.5–5.3)
PROT SERPL-MCNC: 7.6 G/DL — SIGNIFICANT CHANGE UP (ref 6–8.3)
PROTHROM AB SERPL-ACNC: 13 SEC — SIGNIFICANT CHANGE UP (ref 10.5–13.4)
RAPID RVP RESULT: SIGNIFICANT CHANGE UP
RBC # BLD: 4.4 M/UL — SIGNIFICANT CHANGE UP (ref 3.8–5.2)
RBC # FLD: 12.7 % — SIGNIFICANT CHANGE UP (ref 10.3–14.5)
RH IG SCN BLD-IMP: NEGATIVE — SIGNIFICANT CHANGE UP
RSV RNA SPEC QL NAA+PROBE: SIGNIFICANT CHANGE UP
RV+EV RNA SPEC QL NAA+PROBE: SIGNIFICANT CHANGE UP
SAO2 % BLDV: 76.6 % — SIGNIFICANT CHANGE UP
SARS-COV-2 RNA SPEC QL NAA+PROBE: SIGNIFICANT CHANGE UP
SODIUM SERPL-SCNC: 137 MMOL/L — SIGNIFICANT CHANGE UP (ref 135–145)
SPECIMEN SOURCE: SIGNIFICANT CHANGE UP
WBC # BLD: 8.9 K/UL — SIGNIFICANT CHANGE UP (ref 3.8–10.5)
WBC # FLD AUTO: 8.9 K/UL — SIGNIFICANT CHANGE UP (ref 3.8–10.5)

## 2022-11-03 PROCEDURE — 58662 LAPAROSCOPY EXCISE LESIONS: CPT

## 2022-11-03 PROCEDURE — 99285 EMERGENCY DEPT VISIT HI MDM: CPT

## 2022-11-03 PROCEDURE — 88307 TISSUE EXAM BY PATHOLOGIST: CPT | Mod: 26

## 2022-11-03 PROCEDURE — 58661 LAPAROSCOPY REMOVE ADNEXA: CPT | Mod: GC

## 2022-11-03 RX ORDER — SODIUM CHLORIDE 9 MG/ML
1000 INJECTION, SOLUTION INTRAVENOUS
Refills: 0 | Status: DISCONTINUED | OUTPATIENT
Start: 2022-11-03 | End: 2022-11-03

## 2022-11-03 RX ORDER — ONDANSETRON 8 MG/1
4 TABLET, FILM COATED ORAL ONCE
Refills: 0 | Status: DISCONTINUED | OUTPATIENT
Start: 2022-11-03 | End: 2022-11-03

## 2022-11-03 RX ORDER — OXYCODONE HYDROCHLORIDE 5 MG/1
1 TABLET ORAL
Qty: 5 | Refills: 0
Start: 2022-11-03

## 2022-11-03 RX ORDER — OXYCODONE HYDROCHLORIDE 5 MG/1
5 TABLET ORAL ONCE
Refills: 0 | Status: DISCONTINUED | OUTPATIENT
Start: 2022-11-03 | End: 2022-11-03

## 2022-11-03 RX ORDER — OXYCODONE HYDROCHLORIDE 5 MG/1
1 TABLET ORAL
Qty: 10 | Refills: 0
Start: 2022-11-03

## 2022-11-03 RX ORDER — FENTANYL CITRATE 50 UG/ML
25 INJECTION INTRAVENOUS
Refills: 0 | Status: DISCONTINUED | OUTPATIENT
Start: 2022-11-03 | End: 2022-11-03

## 2022-11-03 RX ORDER — MORPHINE SULFATE 50 MG/1
4 CAPSULE, EXTENDED RELEASE ORAL ONCE
Refills: 0 | Status: DISCONTINUED | OUTPATIENT
Start: 2022-11-03 | End: 2022-11-03

## 2022-11-03 RX ORDER — ONDANSETRON 8 MG/1
4 TABLET, FILM COATED ORAL ONCE
Refills: 0 | Status: COMPLETED | OUTPATIENT
Start: 2022-11-03 | End: 2022-11-03

## 2022-11-03 RX ORDER — KETOROLAC TROMETHAMINE 30 MG/ML
30 SYRINGE (ML) INJECTION ONCE
Refills: 0 | Status: DISCONTINUED | OUTPATIENT
Start: 2022-11-03 | End: 2022-11-03

## 2022-11-03 RX ORDER — SODIUM CHLORIDE 9 MG/ML
1000 INJECTION INTRAMUSCULAR; INTRAVENOUS; SUBCUTANEOUS ONCE
Refills: 0 | Status: COMPLETED | OUTPATIENT
Start: 2022-11-03 | End: 2022-11-03

## 2022-11-03 RX ADMIN — OXYCODONE HYDROCHLORIDE 5 MILLIGRAM(S): 5 TABLET ORAL at 18:15

## 2022-11-03 RX ADMIN — MORPHINE SULFATE 4 MILLIGRAM(S): 50 CAPSULE, EXTENDED RELEASE ORAL at 06:27

## 2022-11-03 RX ADMIN — ONDANSETRON 4 MILLIGRAM(S): 8 TABLET, FILM COATED ORAL at 07:20

## 2022-11-03 RX ADMIN — SODIUM CHLORIDE 1000 MILLILITER(S): 9 INJECTION INTRAMUSCULAR; INTRAVENOUS; SUBCUTANEOUS at 06:26

## 2022-11-03 RX ADMIN — OXYCODONE HYDROCHLORIDE 5 MILLIGRAM(S): 5 TABLET ORAL at 17:26

## 2022-11-03 NOTE — ASU DISCHARGE PLAN (ADULT/PEDIATRIC) - NURSING INSTRUCTIONS
Please report any signs and symptoms of infection including Fever (Temp >101 or >100.4 if GYN procedure), uncontrollable nausea, vomiting, diarrhea, chills & inability to urinate. Shower with soap & water when appropriate, pat dry with clean towel & no ointments, creams, powders or lotions on incisions unless okayed by MD. Please report any puss or increased drainage from incision sites, or if redness develops and spreads around sites. Please practice good hand hygiene especially after using the bathroom. Follow up with all MD appointments and take medication(s) as prescribed  DO NOT take any Tylenol (Acetaminophen) or narcotics containing Tylenol until after  8pm on . You received Tylenol during your operation and it can cause damage to your liver if too much is taken within a 24 hour time period.   Nothing in vagina, no intercourse, no douching, no tampons, no tub baths, and no swimming for 2 weeks or until cleared by M.D.

## 2022-11-03 NOTE — BRIEF OPERATIVE NOTE - OPERATION/FINDINGS
EUA: Revealed a small anteverted uterus approximately 6 weeks in size. Adnexa not palpable bilaterally. Cervix closed. Round mass palpated in the posterior cul de sac.   Laparoscopy: Abdominal survey revealed a grossly normal uterus, bilateral fallopian tubes, and right ovary. Left ovary noted to have a 9cm cyst protruding off of it. Ovarian torsion x3 noted on laparoscopic survey. Bowel grossly normal on laparoscopic survey. No free fluid noted in the abdominal cavity EUA: Revealed a small anteverted uterus approximately 6 weeks in size. Cervix closed. Round mass palpated in the posterior cul de sac.   Laparoscopy: Abdominal survey revealed a grossly normal uterus, bilateral fallopian tubes, and right ovary. Left ovary noted to have a 9cm cyst protruding off of it. Ovarian torsion x3 noted on laparoscopic survey. Bowel grossly normal on laparoscopic survey. No free fluid noted in the abdominal cavity

## 2022-11-03 NOTE — ASU DISCHARGE PLAN (ADULT/PEDIATRIC) - NS MD DC FALL RISK RISK
For information on Fall & Injury Prevention, visit: [email or web address] OR  [email or web address] OR  https://www.cdc.gov/steadi/patient.html

## 2022-11-03 NOTE — H&P ADULT - ASSESSMENT
32 year old  LMP 10/25 presenting with left sided pelvic pain x3 days. History of ovarian cyst s/p cystectomy in childhood. Dermoid visualized on previous outpatient sono on left ovary. 2nd visit to ED for this pain. No WBC, H+H wnl. TVUS showing  left adnexal dermoid measuring 8.6 cm increased in sized compared to 2019. Flow visualized to both ovaries however intermittent torsion cannot be ruled out.     - Admit to Dr. Vazquez   - Please obtain COVID swab, CBC, coags, T+S  - Add on to OR for LSC ovarian cystectomy       Erika Alanis, PGY2  Plan per Dr. Vazquez

## 2022-11-03 NOTE — ASU DISCHARGE PLAN (ADULT/PEDIATRIC) - CALL YOUR DOCTOR IF YOU HAVE ANY OF THE FOLLOWING:
Bleeding that does not stop/Fever greater than (need to indicate Fahrenheit or Celsius)/Nausea and vomiting that does not stop/Unable to urinate/Excessive diarrhea/Inability to tolerate liquids or foods/Increased irritability or sluggishness Bleeding that does not stop/Pain not relieved by Medications/Fever greater than (need to indicate Fahrenheit or Celsius)/Wound/Surgical Site with redness, or foul smelling discharge or pus/Nausea and vomiting that does not stop/Unable to urinate/Excessive diarrhea/Inability to tolerate liquids or foods/Increased irritability or sluggishness

## 2022-11-03 NOTE — ASU DISCHARGE PLAN (ADULT/PEDIATRIC) - ASU DC SPECIAL INSTRUCTIONSFT
After your surgery it is normal to experience:    •	Vaginal bleeding- can last 1-2 weeks and should not be heavier than a period. It may come and go and be red, brown or pink. Use pads, not tampons.  •	Cramping- Is common especially within the first 24 hours. This should be relieved by taking over the counter motrin, advil or Tylenol.    •	Vaginal soreness/irritation- can occur in the first few days after surgery because of the instruments that were used in the vagina. Soreness can be treated with ice pack and irritation can be taken care of with an emollient such as balmex or aquaphor that you can put directly on the irritated area.    Restrictions: For 10-14 days after the surgery you should avoid the following:    •	Tampons  •	Sex  •	Vigorous gym exercise  •	Swimming  pools and tub baths  •	Wait a day or two before going back to work    Anesthesia Precautions:  For the next 12 hours do not:   •	drive a car,  •	 operate power tools or machinery,  •	drink alcohol, beer, or wine,   •	make important personal or business decisions    Diet:   •	Resume Regular diet but Progress diet slowly     Physician Notification- Warning signs to look out for  •	Heavy Vaginal Bleeding   •	Shortness of breath or chest pain  •	Severe Abdominal Pain  •	Persistent nausea and vomiting  •	Pain not relieved by medications  •	Fever greater than 100.5®F  •	Inability to tolerate liquids or foods  •	Unable to urinate after 8 hours

## 2022-11-03 NOTE — H&P ADULT - ATTENDING COMMENTS
saw patient and now has acute pain with vomitting. Reviewed plan for OR and risks of surgery  OR called and will be next case when a room available

## 2022-11-03 NOTE — ED ADULT NURSE NOTE - OBJECTIVE STATEMENT
Pt received to room 19. Pt a&Ox4, ambulatory at baseline. Pt c/o L pelvic pain x3 days, was seen in ED 2 nights ago for same symptoms and was diagnosed with 8.6cm dermoid cyst on Lt side. Pt was seen by GYN and discharged. Pt returns to ED tonight for increased pain, nausea. Denies any vaginal bleeding, fevers, chills, CP, SOB, dizziness. UCG and HCG negative 11/1. IV access established with 20G to R AC, labs sent as per orders. VSS. Safety measures in place

## 2022-11-03 NOTE — ED PROVIDER NOTE - ATTENDING APP SHARED VISIT CONTRIBUTION OF CARE
33yo F with PMHX known 8.6cm L ovarian cyst, ITP, anxiety, MS, prior ovarian cystectomy, recent ED visit for L pelvic pain with L ovarian cyst noted on TVUS with normal flow and pt deemed stable for discharge home by OBGYN team, now returning to ED for continued and worsening intermittent L pelvic pain with nausea. No vomiting, fevers, urinary symptoms, vaginal d/c or bleeding.    EXAM  appears in pain  abd soft nd +ttp to L pelvic area    a/p  L pelvic pain with known large ovarian cyst on same side  GYN called shortly after arrival and requesting admission for ovarian cystectomy  labs, IV pain meds here, patient aware of plan of care and agrees with surgery

## 2022-11-03 NOTE — ASU DISCHARGE PLAN (ADULT/PEDIATRIC) - FOLLOW UP APPOINTMENTS
[facility], [facility] may also call Recovery Room (PACU) 24/7 @ [phone]/[facility], [facility]

## 2022-11-03 NOTE — ASU DISCHARGE PLAN (ADULT/PEDIATRIC) - CARE PROVIDER_API CALL
Bertha Vazquez (MD)  Obstetrics and Gynecology  [facility] [place] [address]  Phone: [phone]  Fax: [phone]  Follow Up Time: 1 week

## 2022-11-03 NOTE — ED ADULT TRIAGE NOTE - CHIEF COMPLAINT QUOTE
c/o left side abd pain x3days. pt states she was here last night for same complaints, dx with (8.6cm) ovarian cyst. HX ITP, MS

## 2022-11-03 NOTE — BRIEF OPERATIVE NOTE - NSICDXBRIEFPROCEDURE_GEN_ALL_CORE_FT
PROCEDURES:  Laparoscopic left ovarian cystectomy 03-Nov-2022 23:23:50  Liz García  Exam under anesthesia, pelvic 03-Nov-2022 23:23:59  Liz García

## 2022-11-03 NOTE — CHART NOTE - NSCHARTNOTEFT_GEN_A_CORE
R2 OBGYN POST-OP CHECK    S: Patient seen and evaluated at bedside.  Pt awake and alert resting comfortably in bed. Patient reports diffuse abdominal pain specifically shoulder pain when she inhales. Pt denies N/V, SOB, CP, palpitations, fever/chills. Tolerating clears.  Not OOB yet. Voiding spontaneously.    O:   T(C): 36.4 (11-03-22 @ 16:30), Max: 36.4 (11-03-22 @ 15:40)  HR: 68 (11-03-22 @ 17:00) (61 - 74)  BP: 137/79 (11-03-22 @ 17:00) (120/70 - 137/79)  RR: 15 (11-03-22 @ 17:00) (14 - 18)  SpO2: 100% (11-03-22 @ 17:00) (100% - 100%)  Wt(kg): --  I&O's Summary    03 Nov 2022 07:01  -  03 Nov 2022 17:41  --------------------------------------------------------  IN: 350 mL / OUT: 0 mL / NET: 350 mL        Gen: Resting comfortably in bed, NAD  CV: S1S2, RRR  Lungs: CTA B/L  Abd: soft, appropriately tender, occasional BS x 4 quadrants.    Inc: 3 Lsc port sites with steri strip and op sites c/d/i  Ext: SCD's in place and functional, non-tender b/l, no edema        A/P: 32y Female s/p L ovarian cystectomy 2/2 ovarian torsion (x3). Patient is stable and doing well post operatively. Beginning to meet all post operative milestones.    Neuro: PO Analgesia PRN   CV: Hemodynamically stable.  Pulm: Saturating well on room air.  Encourage OOB and incentive spirometer use.   GI: Continue regular diet. Anti-emetics PRN.  : Voiding spontaneously.  Heme: DVT ppx w/ SCD's while in bed. Early ambulation, initially with assistance then as tolerated.   ID: Afebrile  Dispo: Discharge from PACU when criteria met.     CHLOÉ García, PGY2

## 2022-11-03 NOTE — ED ADULT NURSE REASSESSMENT NOTE - NS ED NURSE REASSESS COMMENT FT1
Pt reporting increased pain and nausea, GYN at bedside. Pt pending OR
Pt at baseline mental status, reporting nausea, states "I feel like I can't breathe". Pt satting 100% on RA. VSS and noted. TULIO Lepe made aware and evaluated patient. Lungs clear. GYN paged and made aware. Pt medicated for nausea as per MAR. Pt now reporting relief in symptoms, pt stable upon exiting room. Safety measures in place
pt in bed in room 19 accompanied by family members. A&Ox4, ambulatory with c/o 10/10 pain and nausea, medicated by night shift RN. RR equal and unlabored, lung sounds clear, +2 radial pulses, heart rate and rhythm regular and abdomen soft, non-tender, non-distended. Pt plan to go to OR. Report given to OR. pt belongings kept with family. plan to go to OR in stretcher.

## 2022-11-03 NOTE — ED PROVIDER NOTE - PHYSICAL EXAMINATION
Vital signs reviewed.   CONSTITUTIONAL: Appears uncomfortable,  in no apparent distress. Non-toxic appearing.   HEAD: Normocephalic, atraumatic.  EYES: conjunctiva and sclera WNL.  ENT: normal nose; no rhinorrhea  NECK: Trachea midline   CARD: Normal S1, S2;   RESP: NAD  ABD/GI: soft, non-distended; mild Lt adnexal tenderness.   EXT/MS: moves all extremities  SKIN: Normal for age and race  NEURO: Awake, alert, oriented x 3, no gross deficits  PSYCH: Normal mood; appropriate affect.

## 2022-11-03 NOTE — ASU DISCHARGE PLAN (ADULT/PEDIATRIC) - ACTIVITY LEVEL
No excercise/Elevate extremity/Nothing per rectum/Nothing per vagina/No tub baths/No douching/No tampons/No intercourse

## 2022-11-03 NOTE — ASU DISCHARGE PLAN (ADULT/PEDIATRIC) - MEDICATION INSTRUCTIONS
oxycodone 5mg every 6 hours 975mg of tylenol every 6 hours, 600 mg of motrin every 6 hours as needed for pain

## 2022-11-03 NOTE — ED PROVIDER NOTE - OBJECTIVE STATEMENT
Patient is a 32 y.o female with PMHx of dermoid cyst, ITP, Anxiety, MS,  Hx of cyst removal at age 14,  currently 4 month Postpartum who returns to ED c/o worsening Lt pelvic pain. Pt has been having intermittent Lt side pelvic pain x 3 days.  Pt was seen in ED yesterday for same complaint. TVUS showed 8.6 cm dermoid Cyst on Lt side with partially visualized left ovary with doppler flow. Pt was evaluated by GYN team, was stable for DC and strict return precautions. Pt now returns stating pain getting worse, not improving with heating pad or analgesic at home. Admits to +nausea. Pt denies fevers, chills, vaginal bleeding, discharge, cp, sob, back pain, dysuria, hematuria, weakness, diarrhea, melena, neck pain, or any other complaints. LMP 10/15.

## 2022-11-03 NOTE — ED PROVIDER NOTE - CLINICAL SUMMARY MEDICAL DECISION MAKING FREE TEXT BOX
Patient is a 32 y.o female with PMHx of dermoid cyst, ITP, Anxiety, MS,  Hx of cyst removal at age 14,  currently 4 month Postpartum who returns to ED c/o worsening Lt pelvic pain. Pt has been having intermittent Lt side pelvic pain x 3 days.      DDx- Lt dermoid cyst with pelvic pain possible torsion. Plan for labs and TVUS. gyn called and state patient can be admitted to their service at this time.

## 2022-11-03 NOTE — H&P ADULT - HISTORY OF PRESENT ILLNESS
VIVIENNE HENRIQUEZ  32y  Female [id number]    HPI:  32 year old  LMP 10/25 presenting with left sided pelvic pain x3 days. Pt was seen in the ED yesterday for L sided pain, had benign exam. Pt states pain returned yesterday after leaving the hospital. Pt called her doctor and was instructed to return to the hospital. Pt states the pain as dull cramping pain on her left side. Denies sharp pain. Pt states that at that time the pain was 7/10 that lasted an hour or so. Pt took Tylenol and Motrin at home without relief. Pain improved with heat packs. Pt received Toradol in ED at 9pm with improvement in the pain. Denies nausea/vomiting. Denies fevers, chills. Denies dysuria, frequency, urgency.     Last ate at 6p, 22       Name of GYN Physician: Dr. Pratt for pregnancy. Pt saw Vivienne Sim NP for pain, now followed by [facility] for cyst.     Past OB:   2022    Past gyn: Regular menses, history of ovarian cysts. Denies fibroids, endometriosis, STI's, Abnormal pap smears     Home meds: none     Allergies: sulfa drugs - hives     PAST MEDICAL & SURGICAL HISTORY:  - Anxiety  - Multiple sclerosis  - LSC cholecystectomy (2010)  - Brain biopsy (2019)  - LSC cystectomy at age 15 for ovarian cyst, unsure which side     Social History:  Denies smoking use, drug use, alcohol use.     Vital Signs Last 24 Hrs  T(C): 36.7 (2022 03:45), Max: 36.7 (2022 03:45)  T(F): 98 (2022 03:45), Max: 98 (2022 03:45)  HR: 83 (2022 03:45) (83 - 83)  BP: 118/73 (2022 03:45) (118/73 - 118/73)  BP(mean): --  ABP: --  ABP(mean): --  RR: 18 (2022 03:45) (18 - 18)  SpO2: 100% (2022 03:45) (100% - 100%)      Physical Exam:   General: sitting comfortably in bed, NAD   Abd: Soft, no rebound, no guarding, mild tenderness to palpation of the left lower quadrant.   Ext: non-tender b/l, no edema     LABS:                              13.4   7.11  )-----------( 313      ( 2022 21:07 )             40.7         139  |  106  |  6<L>  ----------------------------<  88  3.5   |  22  |  0.65    Ca    8.7      2022 21:07    TPro  7.6  /  Alb  4.2  /  TBili  0.2  /  DBili  x   /  AST  19  /  ALT  12  /  AlkPhos  69      I&O's Detail      Urinalysis Basic - ( 2022 21:09 )    Color: Light Yellow / Appearance: Clear / S.027 / pH: x  Gluc: x / Ketone: Trace  / Bili: Negative / Urobili: <2 mg/dL   Blood: x / Protein: 30 mg/dL / Nitrite: Negative   Leuk Esterase: Negative / RBC: 2 /HPF / WBC 4 /HPF   Sq Epi: x / Non Sq Epi: 5 /HPF / Bacteria: Few    HCG Quantitative, Serum: <5.0    RADIOLOGY & ADDITIONAL STUDIES:    < from: US Transvaginal (22 @ 22:08) >  US TRANSVAGINAL                          PROCEDURE DATE:  2022          INTERPRETATION:  CLINICAL INFORMATION: Left adnexal tenderness    LMP: 10/26/2022    COMPARISON: CT abdomen/pelvis dated 4/15/2019    TECHNIQUE:  Endovaginal pelvic sonogram only. Color and Spectral Doppler was   performed.    FINDINGS:  Uterus: 9.0 cm x 3.5 cm x 5.4 cm. Within normal limits.  Endometrium: 4 mm. Within normal limits.    Right ovary: 4.1 cm x 1.9 cm. Within normal limits. Normal arterial and   venous waveforms.  Left ovary: The left ovary suboptimally visualized due to a 7.6 x 5.6 x   8.6 cm echogenic structure, characterized as a dermoid cyst on prior CT   abdomen/pelvis. The partially visualized ovary demonstrates arterial and   venous waveforms.    Fluid: None.    IMPRESSION:  Left adnexal dermoid measuring up to 8.6 cm which is increased in size   when compared to 4/15/2019. Flow is demonstrated within the partially   visualized left ovary. Intermittent torsion is not entirely excluded.    --- End of Report ---      < end of copied text >

## 2022-11-04 ENCOUNTER — NON-APPOINTMENT (OUTPATIENT)
Age: 32
End: 2022-11-04

## 2022-11-05 RX ORDER — IBUPROFEN 800 MG/1
800 TABLET, FILM COATED ORAL EVERY 8 HOURS
Qty: 90 | Refills: 0 | Status: ACTIVE | COMMUNITY
Start: 2022-11-05 | End: 1900-01-01

## 2022-11-06 ENCOUNTER — INPATIENT (INPATIENT)
Facility: HOSPITAL | Age: 32
LOS: 3 days | Discharge: HOME | End: 2022-11-10
Attending: OBSTETRICS & GYNECOLOGY | Admitting: OBSTETRICS & GYNECOLOGY
Payer: COMMERCIAL

## 2022-11-06 VITALS
TEMPERATURE: 102 F | HEIGHT: 64 IN | HEART RATE: 122 BPM | OXYGEN SATURATION: 97 % | DIASTOLIC BLOOD PRESSURE: 67 MMHG | SYSTOLIC BLOOD PRESSURE: 124 MMHG | RESPIRATION RATE: 20 BRPM

## 2022-11-06 DIAGNOSIS — R10.9 UNSPECIFIED ABDOMINAL PAIN: ICD-10-CM

## 2022-11-06 DIAGNOSIS — Z90.49 ACQUIRED ABSENCE OF OTHER SPECIFIED PARTS OF DIGESTIVE TRACT: Chronic | ICD-10-CM

## 2022-11-06 LAB
ALBUMIN SERPL ELPH-MCNC: 3.8 G/DL — SIGNIFICANT CHANGE UP (ref 3.3–5)
ALP SERPL-CCNC: 64 U/L — SIGNIFICANT CHANGE UP (ref 40–120)
ALT FLD-CCNC: 17 U/L — SIGNIFICANT CHANGE UP (ref 4–33)
ANION GAP SERPL CALC-SCNC: 11 MMOL/L — SIGNIFICANT CHANGE UP (ref 7–14)
APTT BLD: 27.8 SEC — SIGNIFICANT CHANGE UP (ref 27–36.3)
AST SERPL-CCNC: 18 U/L — SIGNIFICANT CHANGE UP (ref 4–32)
BASE EXCESS BLDV CALC-SCNC: 2.8 MMOL/L — SIGNIFICANT CHANGE UP (ref -2–3)
BASOPHILS # BLD AUTO: 0 K/UL — SIGNIFICANT CHANGE UP (ref 0–0.2)
BASOPHILS NFR BLD AUTO: 0 % — SIGNIFICANT CHANGE UP (ref 0–2)
BILIRUB SERPL-MCNC: 0.6 MG/DL — SIGNIFICANT CHANGE UP (ref 0.2–1.2)
BLOOD GAS VENOUS COMPREHENSIVE RESULT: SIGNIFICANT CHANGE UP
BUN SERPL-MCNC: 4 MG/DL — LOW (ref 7–23)
CALCIUM SERPL-MCNC: 9.2 MG/DL — SIGNIFICANT CHANGE UP (ref 8.4–10.5)
CHLORIDE BLDV-SCNC: 99 MMOL/L — SIGNIFICANT CHANGE UP (ref 96–108)
CHLORIDE SERPL-SCNC: 99 MMOL/L — SIGNIFICANT CHANGE UP (ref 98–107)
CO2 BLDV-SCNC: 29.9 MMOL/L — HIGH (ref 22–26)
CO2 SERPL-SCNC: 25 MMOL/L — SIGNIFICANT CHANGE UP (ref 22–31)
CREAT SERPL-MCNC: 0.68 MG/DL — SIGNIFICANT CHANGE UP (ref 0.5–1.3)
EGFR: 120 ML/MIN/1.73M2 — SIGNIFICANT CHANGE UP
EOSINOPHIL # BLD AUTO: 0 K/UL — SIGNIFICANT CHANGE UP (ref 0–0.5)
EOSINOPHIL NFR BLD AUTO: 0 % — SIGNIFICANT CHANGE UP (ref 0–6)
FLUAV AG NPH QL: SIGNIFICANT CHANGE UP
FLUBV AG NPH QL: SIGNIFICANT CHANGE UP
GAS PNL BLDV: 134 MMOL/L — LOW (ref 136–145)
GLUCOSE BLDV-MCNC: 102 MG/DL — HIGH (ref 70–99)
GLUCOSE SERPL-MCNC: 98 MG/DL — SIGNIFICANT CHANGE UP (ref 70–99)
HCO3 BLDV-SCNC: 28 MMOL/L — SIGNIFICANT CHANGE UP (ref 22–29)
HCT VFR BLD CALC: 39.7 % — SIGNIFICANT CHANGE UP (ref 34.5–45)
HCT VFR BLDA CALC: 38 % — SIGNIFICANT CHANGE UP (ref 34.5–46.5)
HGB BLD CALC-MCNC: 12.8 G/DL — SIGNIFICANT CHANGE UP (ref 11.5–15.5)
HGB BLD-MCNC: 12.6 G/DL — SIGNIFICANT CHANGE UP (ref 11.5–15.5)
IANC: 8.7 K/UL — HIGH (ref 1.8–7.4)
INR BLD: 1.26 RATIO — HIGH (ref 0.88–1.16)
LACTATE BLDV-MCNC: 1.9 MMOL/L — SIGNIFICANT CHANGE UP (ref 0.5–2)
LYMPHOCYTES # BLD AUTO: 0.41 K/UL — LOW (ref 1–3.3)
LYMPHOCYTES # BLD AUTO: 4.3 % — LOW (ref 13–44)
MACROCYTES BLD QL: SLIGHT — SIGNIFICANT CHANGE UP
MANUAL SMEAR VERIFICATION: SIGNIFICANT CHANGE UP
MCHC RBC-ENTMCNC: 28.1 PG — SIGNIFICANT CHANGE UP (ref 27–34)
MCHC RBC-ENTMCNC: 31.7 GM/DL — LOW (ref 32–36)
MCV RBC AUTO: 88.6 FL — SIGNIFICANT CHANGE UP (ref 80–100)
MICROCYTES BLD QL: SIGNIFICANT CHANGE UP
MONOCYTES # BLD AUTO: 0.09 K/UL — SIGNIFICANT CHANGE UP (ref 0–0.9)
MONOCYTES NFR BLD AUTO: 0.9 % — LOW (ref 2–14)
NEUTROPHILS # BLD AUTO: 8.91 K/UL — HIGH (ref 1.8–7.4)
NEUTROPHILS NFR BLD AUTO: 92.2 % — HIGH (ref 43–77)
NEUTS BAND # BLD: 1.7 % — SIGNIFICANT CHANGE UP (ref 0–6)
OVALOCYTES BLD QL SMEAR: SLIGHT — SIGNIFICANT CHANGE UP
PCO2 BLDV: 47 MMHG — HIGH (ref 39–42)
PH BLDV: 7.39 — SIGNIFICANT CHANGE UP (ref 7.32–7.43)
PLAT MORPH BLD: NORMAL — SIGNIFICANT CHANGE UP
PLATELET # BLD AUTO: 285 K/UL — SIGNIFICANT CHANGE UP (ref 150–400)
PLATELET COUNT - ESTIMATE: NORMAL — SIGNIFICANT CHANGE UP
PO2 BLDV: 24 MMHG — SIGNIFICANT CHANGE UP
POTASSIUM BLDV-SCNC: 3.5 MMOL/L — SIGNIFICANT CHANGE UP (ref 3.5–5.1)
POTASSIUM SERPL-MCNC: 3.4 MMOL/L — LOW (ref 3.5–5.3)
POTASSIUM SERPL-SCNC: 3.4 MMOL/L — LOW (ref 3.5–5.3)
PROT SERPL-MCNC: 8.4 G/DL — HIGH (ref 6–8.3)
PROTHROM AB SERPL-ACNC: 14.7 SEC — HIGH (ref 10.5–13.4)
RBC # BLD: 4.48 M/UL — SIGNIFICANT CHANGE UP (ref 3.8–5.2)
RBC # FLD: 12.5 % — SIGNIFICANT CHANGE UP (ref 10.3–14.5)
RBC BLD AUTO: NORMAL — SIGNIFICANT CHANGE UP
RSV RNA NPH QL NAA+NON-PROBE: SIGNIFICANT CHANGE UP
SAO2 % BLDV: 40.1 % — SIGNIFICANT CHANGE UP
SARS-COV-2 RNA SPEC QL NAA+PROBE: SIGNIFICANT CHANGE UP
SODIUM SERPL-SCNC: 135 MMOL/L — SIGNIFICANT CHANGE UP (ref 135–145)
VARIANT LYMPHS # BLD: 0.9 % — SIGNIFICANT CHANGE UP (ref 0–6)
WBC # BLD: 9.49 K/UL — SIGNIFICANT CHANGE UP (ref 3.8–10.5)
WBC # FLD AUTO: 9.49 K/UL — SIGNIFICANT CHANGE UP (ref 3.8–10.5)

## 2022-11-06 PROCEDURE — 71045 X-RAY EXAM CHEST 1 VIEW: CPT | Mod: 26

## 2022-11-06 PROCEDURE — 74177 CT ABD & PELVIS W/CONTRAST: CPT | Mod: 26,MB

## 2022-11-06 PROCEDURE — 99285 EMERGENCY DEPT VISIT HI MDM: CPT

## 2022-11-06 RX ORDER — ACETAMINOPHEN 500 MG
1000 TABLET ORAL ONCE
Refills: 0 | Status: COMPLETED | OUTPATIENT
Start: 2022-11-06 | End: 2022-11-06

## 2022-11-06 RX ORDER — VANCOMYCIN HCL 1 G
1000 VIAL (EA) INTRAVENOUS ONCE
Refills: 0 | Status: COMPLETED | OUTPATIENT
Start: 2022-11-06 | End: 2022-11-06

## 2022-11-06 RX ORDER — MORPHINE SULFATE 50 MG/1
4 CAPSULE, EXTENDED RELEASE ORAL ONCE
Refills: 0 | Status: DISCONTINUED | OUTPATIENT
Start: 2022-11-06 | End: 2022-11-06

## 2022-11-06 RX ORDER — PIPERACILLIN AND TAZOBACTAM 4; .5 G/20ML; G/20ML
3.38 INJECTION, POWDER, LYOPHILIZED, FOR SOLUTION INTRAVENOUS ONCE
Refills: 0 | Status: COMPLETED | OUTPATIENT
Start: 2022-11-06 | End: 2022-11-06

## 2022-11-06 RX ORDER — SODIUM CHLORIDE 9 MG/ML
2000 INJECTION INTRAMUSCULAR; INTRAVENOUS; SUBCUTANEOUS ONCE
Refills: 0 | Status: COMPLETED | OUTPATIENT
Start: 2022-11-06 | End: 2022-11-06

## 2022-11-06 RX ORDER — SODIUM CHLORIDE 9 MG/ML
1000 INJECTION, SOLUTION INTRAVENOUS
Refills: 0 | Status: DISCONTINUED | OUTPATIENT
Start: 2022-11-06 | End: 2022-11-07

## 2022-11-06 RX ORDER — ACETAMINOPHEN 500 MG
1000 TABLET ORAL ONCE
Refills: 0 | Status: DISCONTINUED | OUTPATIENT
Start: 2022-11-07 | End: 2022-11-07

## 2022-11-06 RX ORDER — POLYETHYLENE GLYCOL 3350 17 G/17G
17 POWDER, FOR SOLUTION ORAL DAILY
Refills: 0 | Status: DISCONTINUED | OUTPATIENT
Start: 2022-11-06 | End: 2022-11-10

## 2022-11-06 RX ORDER — ONDANSETRON 8 MG/1
4 TABLET, FILM COATED ORAL ONCE
Refills: 0 | Status: COMPLETED | OUTPATIENT
Start: 2022-11-06 | End: 2022-11-06

## 2022-11-06 RX ORDER — ACETAMINOPHEN 500 MG
1000 TABLET ORAL ONCE
Refills: 0 | Status: COMPLETED | OUTPATIENT
Start: 2022-11-07 | End: 2022-11-07

## 2022-11-06 RX ORDER — SENNA PLUS 8.6 MG/1
2 TABLET ORAL AT BEDTIME
Refills: 0 | Status: DISCONTINUED | OUTPATIENT
Start: 2022-11-06 | End: 2022-11-10

## 2022-11-06 RX ORDER — KETOROLAC TROMETHAMINE 30 MG/ML
15 SYRINGE (ML) INJECTION EVERY 6 HOURS
Refills: 0 | Status: DISCONTINUED | OUTPATIENT
Start: 2022-11-06 | End: 2022-11-07

## 2022-11-06 RX ORDER — HEPARIN SODIUM 5000 [USP'U]/ML
5000 INJECTION INTRAVENOUS; SUBCUTANEOUS EVERY 12 HOURS
Refills: 0 | Status: DISCONTINUED | OUTPATIENT
Start: 2022-11-06 | End: 2022-11-08

## 2022-11-06 RX ADMIN — ONDANSETRON 4 MILLIGRAM(S): 8 TABLET, FILM COATED ORAL at 17:18

## 2022-11-06 RX ADMIN — MORPHINE SULFATE 4 MILLIGRAM(S): 50 CAPSULE, EXTENDED RELEASE ORAL at 20:51

## 2022-11-06 RX ADMIN — SODIUM CHLORIDE 2000 MILLILITER(S): 9 INJECTION INTRAMUSCULAR; INTRAVENOUS; SUBCUTANEOUS at 17:18

## 2022-11-06 RX ADMIN — Medication 400 MILLIGRAM(S): at 23:20

## 2022-11-06 RX ADMIN — SODIUM CHLORIDE 125 MILLILITER(S): 9 INJECTION, SOLUTION INTRAVENOUS at 22:16

## 2022-11-06 RX ADMIN — MORPHINE SULFATE 4 MILLIGRAM(S): 50 CAPSULE, EXTENDED RELEASE ORAL at 17:19

## 2022-11-06 RX ADMIN — Medication 400 MILLIGRAM(S): at 17:18

## 2022-11-06 RX ADMIN — Medication 15 MILLIGRAM(S): at 22:15

## 2022-11-06 RX ADMIN — SENNA PLUS 2 TABLET(S): 8.6 TABLET ORAL at 22:16

## 2022-11-06 RX ADMIN — HEPARIN SODIUM 5000 UNIT(S): 5000 INJECTION INTRAVENOUS; SUBCUTANEOUS at 22:16

## 2022-11-06 RX ADMIN — PIPERACILLIN AND TAZOBACTAM 200 GRAM(S): 4; .5 INJECTION, POWDER, LYOPHILIZED, FOR SOLUTION INTRAVENOUS at 17:18

## 2022-11-06 RX ADMIN — Medication 250 MILLIGRAM(S): at 20:02

## 2022-11-06 RX ADMIN — POLYETHYLENE GLYCOL 3350 17 GRAM(S): 17 POWDER, FOR SOLUTION ORAL at 22:16

## 2022-11-06 NOTE — ED ADULT NURSE REASSESSMENT NOTE - NS ED NURSE REASSESS COMMENT FT1
Report received from [facility] TOM Santos. Patient A&Ox4, respirations even and unlabored, patient endorsing right side abdominal pain, medicated per orders. Vitals stable. Aware of plan for admission to hospital. B/l 20G IVs observed, intact and patent. Stretcher in lowest position, wheels locked, appropriate side rails in place, call bell in reach.

## 2022-11-06 NOTE — ED PROVIDER NOTE - PHYSICAL EXAMINATION
Gen: WDWN, uncomfortable appearing, febrile to 102.3 orally, hemodynamically stable, tachycardic   HEENT: No nasal discharge, mucous membranes dry, no oropharyngeal edema/erythema/exudates   CV: Tachycardic with regular rhythm, +S1/S2, no M/R/G, equal b/l radial pulses 2+  Resp: CTAB, no W/R/R, SPO2 >95% on RA, no increased WOB   GI: Abdomen soft non-distended, RLQ TTP at laparoscopic site; c/d/i with guarding and no rebound, no masses/organomegaly   MSK/Skin: No CVA tenderness, no open wounds, no bruising, no LE edema  Neuro: A&Ox4, moving all 4 extremities spontaneously, gross sensation intact in UE and LE BL  Psych: appropriate mood

## 2022-11-06 NOTE — H&P ADULT - HISTORY OF PRESENT ILLNESS
DARIO HENRIQUEZ  32y  Female [id number]    HPI:  32 year old  LMP 10/25 POD3 s/p LSC Left ovarian cystectomy presenting with fevers at home. Pt states she has been doing well post operatively however started to feel hot at home and took her temperature and found it to be 103. Pt endorsing right sided pelvic pain, however, she has been feeling that since her surgery and says that it is not new. Pt at home was ambulating without difficulty, passing gas, voiding well. Pt has been tolerating PO but endorses decreased appetite since surgery. Pt has not had a bowel movement since surgery. Pt denies nausea, vomiting. Pt denies chest pain, shortness of breath. Denies dysuria, frequency, urgency.     Name of GYN Physician: Surgery with Dr. Vazquez 11/3    Past OB:   2022    Past gyn: Regular menses, history of ovarian cysts. Denies fibroids, endometriosis, STI's, Abnormal pap smears     Home meds: none     Allergies: sulfa drugs - hives     PAST MEDICAL & SURGICAL HISTORY:  - Anxiety  - Multiple sclerosis  - LSC cholecystectomy ()  - Brain biopsy ()  - LSC cystectomy at age 15 for ovarian cyst, unsure which side     Social History:  Denies smoking use, drug use, alcohol use.   Vital Signs Last 24 Hrs  T(C): 37.8 (2022 19:12), Max: 39.3 (2022 17:00)  T(F): 100 (2022 19:12), Max: 102.8 (2022 17:00)  HR: 100 (2022 19:12) (100 - 122)  BP: 124/69 (2022 19:12) (124/67 - 130/80)  BP(mean): --  RR: 20 (2022 19:12) (20 - 20)  SpO2: 100% (2022 19:12) (97% - 100%)    Parameters below as of 2022 19:12  Patient On (Oxygen Delivery Method): room air        Physical Exam:   General: sitting comfortably in bed, NAD   CV: RR S1S2 no m/r/g  Lungs: CTA b/l, good air flow b/l   Back: No CVA tenderness  Abd: Soft, non-tender, non-distended.  Bowel sounds present.    :  No bleeding on pad.    External labia wnl.  Bimanual exam with no cervical motion tenderness, uterus wnl, adnexa non palpable b/l.  Cervix closed vs. Cervix dilated    cm   Speculum Exam: No active bleeding from os.  Physiologic discharge.    Ext: non-tender b/l, no edema     LABS:                              12.6   9.49  )-----------( 285      ( 2022 17:20 )             39.7         135  |  99  |  4<L>  ----------------------------<  98  3.4<L>   |  25  |  0.68    Ca    9.2      2022 17:20    TPro  8.4<H>  /  Alb  3.8  /  TBili  0.6  /  DBili  x   /  AST  18  /  ALT  17  /  AlkPhos  64      < from: CT Abdomen and Pelvis w/ IV Cont (22 @ 18:53) >  T ABDOMEN AND PELVIS IC                          PROCEDURE DATE:  2022          INTERPRETATION:  CLINICAL INFORMATION: Laparoscopic left ovarian   cystectomy on 11/3/2022. Patient presents with abdominal pain and fevers.    COMPARISON: CT chest/abdomen/pelvis 4/15/2019.    CONTRAST/COMPLICATIONS:  IV Contrast: Omnipaque 350  90 cc administered   10 cc discarded  Oral Contrast: NONE  Complications: None reported at time of study completion    PROCEDURE:  CT of the Abdomen and Pelvis was performed.  Sagittal and coronal reformats were performed.    FINDINGS:  LOWER CHEST: Bilateral trace pleural effusions, right more the left.    LIVER: Within normal limits.  BILE DUCTS: Normal caliber.  GALLBLADDER: Cholecystectomy.  SPLEEN: Within normal limits.  PANCREAS: Within normal limits.  ADRENALS: Within normal limits.  KIDNEYS/URETERS: Symmetrically enhancing parenchyma. No hydronephrosis.    BLADDER: Within normal limits.  REPRODUCTIVE ORGANS: Uterus and adnexa within normal limits. Interval   left adnexal cystectomy.    BOWEL: No bowel obstruction. Appendix is normal.  PERITONEUM: Trace pelvic fluid. Mild left lower quadrant and anterior   lower abdomen peritoneal thickening. Prominent fat stranding with   inflammatory changes along the right paracolic gutter likely represent   postsurgical changes/omental infarct in keeping with the history of   recent instrumentation. No rim-enhancing fluid collection to suggest an   abscess.  VESSELS: Within normal limits.  RETROPERITONEUM/LYMPH NODES: No enlarged lymph nodes measuring greater   than 10 mm at short axis.  ABDOMINAL WALL: Postsurgical changes.  BONES: Within normal limits.    IMPRESSION:    Interval left adnexal cystectomy with associated fat stranding and   inflammatory changes more prominent along the right paracolic gutter   suggesting postsurgical changes. Superimposed omental infarct and or   early infection can also be considered.    No drainable fluid collection or an abscess.        --- End of Report ---        < end of copied text >  < from: Xray Chest 1 View- PORTABLE-Urgent (22 @ 21:49) >  ******PRELIMINARY REPORT******       ACC: [id number] EXAM:  XR CHEST PORTABLE URGENT 1V                          PROCEDURE DATE:  2022    ******PRELIMINARY REPORT******      ******PRELIMINARY REPORT******           INTERPRETATION:  CLINICAL INFORMATION: Sepsis.    TECHNIQUE: AP radiograph of the chest.    COMPARISON: Chest radiograph dated 2019.    FINDINGS:    The lungs are clear. No pleural effusion or pneumothorax.  Normal cardiomediastinal silhouette.  No acute bony pathology.    IMPRESSION:    Clear lungs.        ******PRELIMINARY REPORT******      < end of copied text >  I&O's Detail    PT/INR - ( 2022 17:20 )   PT: 14.7 sec;   INR: 1.26 ratio         PTT - ( 2022 17:20 )  PTT:27.8 sec

## 2022-11-06 NOTE — PATIENT PROFILE ADULT - FALL HARM RISK - DEVICES
left IJ MLC fish hole procedure placement of RT Fem Central Line placement of Central Line (Right IJ) Arterial line placement Central line placement None

## 2022-11-06 NOTE — H&P ADULT - ASSESSMENT
32 year old  LMP 10/25 POD3 s/p LSC Left ovarian cystectomy presenting with fevers at home. Pt febrile in ED to 39.3. CT showing post operative changes vs omental infarction. No WBC, VS stable. Clear lungs on XR, UA pending. Post operative infection vs omental infarct.     - Admit to Dr. Vazquez   - NPO except meds, LR@125  - c/w IV Zosyn   - Toradol, IV Tylenol   - Bowel regimen     Erika Alanis, PGY2  d/w Dr. Mcintyre

## 2022-11-06 NOTE — ED ADULT NURSE NOTE - CHIEF COMPLAINT QUOTE
Pt AOX4 c/o fever and Right sided abd pain - pt was d/c'd [facility]/3 s/p Left ovarian cyst surgery  pt spoke to Dr. Mosher today told to come to ER stat - performing surgeon was Dr. Taylor LR of [facility] and MS

## 2022-11-06 NOTE — ED ADULT TRIAGE NOTE - CHIEF COMPLAINT QUOTE
Pt AOX4 c/o fever and Right sided abd pain - pt was d/c'd from [facility] on 11/3 s/p Left ovarian cyst surgery  pt spoke to Dr. Mosher today told to come to ER stat - performing surgeon was Dr. Taylor LR of [facility] and [place]

## 2022-11-06 NOTE — ED ADULT NURSE NOTE - OBJECTIVE STATEMENT
Pt received to rm 3, awake and alert, A&OX4, ambulatory. States she had left ovarian cystectomy on Thursday. States she developed worsening RLQ abd pain this evening and fever at home, max 103.7 dg F. Took motrin few hours PTA with minimal relief. Unable to tolerate laying flat due to pain. Respirations even and unlabored. Has not had bowel movement since surgery. Delivered baby 4 months ago at [facility], denies any complications with pregnancy or delivery. Denies CP, SOB, N/V, HA, dizziness, palpitations. 20G IV placed to BL ACs. NSR on CM. Bed in lowest position, call bell within reach. Awaiting CT Pt received to rm 3, awake and alert, A&OX4, ambulatory. 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 States she developed worsening RLQ abd pain this evening and fever at home, max 103.7 dg F. Took motrin few hours PTA with minimal relief. Unable to tolerate laying flat due to pain. Respirations even and unlabored. Has not had bowel movement since surgery. Delivered baby 4 months ago at [facility], denies any complications with pregnancy or delivery. Denies CP, SOB, N/V, HA, dizziness, palpitations. 20G IV placed to BL ACs. NSR on CM. Bed in lowest position, call bell within reach. Awaiting CT

## 2022-11-06 NOTE — ED PROVIDER NOTE - OBJECTIVE STATEMENT
33 y/o female with pmhx of MS, TTP s/p left ovarian cystectomy on 11/3 (Dr. Vazquez) presenting with abdominal pain and fever. Patient reports sudden onset of right lower quadrant pain at laparoscopic site after waking from nap.  Also reports fever, T-max of 103.7, last Motrin a few hours ago prior to arrival.  Denies any vaginal bleeding or discharge.  No bowel movement since surgery.  Denies nausea/vomiting/diarrhea, cough, rashes, or changes in urination.  Patient contacted Dr. Huggins and Dr. Mosher from OB/GYN who told patient to come into ER for evaluation.

## 2022-11-06 NOTE — ED PROVIDER NOTE - ATTENDING CONTRIBUTION TO CARE
nghia: 33 yo woman presents with severe abd pain, had recent gyn surgery (cystectomy, 11/3) and has fever 102.3 here orally.  also tachycardic, rlq tender (cyst was on the left) pt also has MS.  she has guarding and rebound right sided, left side not tender.  GYN informed pt in ED upon presentation, plan to ct and eval.    I performed a history and physical exam of the patient and discussed their management with the resident and /or advanced care provider. I reviewed the resident and /or ACP's note and agree with the documented findings and plan of care. My medical decison making and observations are found above.

## 2022-11-06 NOTE — H&P ADULT - ATTENDING COMMENTS
Att  Pt seen and evaluated by me. I agree with findings, assessment and plan documented in resident note. Pt 3 days s/p laparoscopic left ovarian cystectomy. She has fever to 39.3, wbc 9, and developing right sided abdominal pain. Pt admitted for non surgical management of possible omental infarct. Will give antibiotics and antiinflammatory meds. Surgical consultation requested. Will keep npo for now. Pt accepting of management plan. All questions answered to her apparent satisfaction. Bertha Mcintyre MD

## 2022-11-06 NOTE — PATIENT PROFILE ADULT - FALL HARM RISK - RISK INTERVENTIONS

## 2022-11-06 NOTE — ED PROVIDER NOTE - CLINICAL SUMMARY MEDICAL DECISION MAKING FREE TEXT BOX
31 y/o female with pmhx of MS, TTP s/p left ovarian cystectomy on 11/3 (Dr. Vazquez) presenting with abdominal pain and fever; uncomfortable appearing, febrile to 102.3 orally, hemodynamically stable, tachycardic; RLQ TTP at laparoscopic site; c/d/i with guarding and no rebound c/f post operative infection/complication; CTAP given pain location due to c/f intraabdominal complication. If CTAP negative will f/u with TVUS. Meets sepsis criteria; empiric abx, fluid bolus, pain control and reassess after labs/imaging with GYN consult 31 y/o female with pmhx of MS, TTP s/p left ovarian cystectomy on 11/3 (Dr. Vazquez) presenting with abdominal pain and fever; uncomfortable appearing, febrile to 102.3 orally,  tachycardic; RLQ TTP at laparoscopic site; c/d/i with guarding and no rebound c/f post operative infection/complication; CTAP given pain location due to c/f intraabdominal complication. If CTAP negative will f/u with TVUS. Meets sepsis criteria; empiric abx, fluid bolus, pain control and reassess after labs/imaging with GYN consult    nghia: 31 yo woman presents with severe abd pain, had recent gyn surgery (cystectomy, 11/3) and has fever 102.3 here orally.  also tachycardic, rlq tender (cyst was on the left) pt also has MS.  she has guarding and rebound right sided, left side not tender.  GYN informed pt in ED upon presentation, plan to ct and eval. 31 y/o female with pmhx of MS, TTP s/p left ovarian cystectomy on 11/3 (Dr. Vazquez) presenting with abdominal pain and fever; uncomfortable appearing, febrile to 102.3 orally,  tachycardic; RLQ TTP at laparoscopic site; c/d/i with guarding and no rebound c/f post operative infection/complication; CTAP given pain location due to c/f intraabdominal complication. If CTAP negative will f/u with TVUS. Meets sepsis criteria; empiric abx, fluid bolus, pain control and reassess after labs/imaging with GYN consult    nghia: 33 yo woman presents with severe abd pain, had recent gyn surgery (cystectomy, 11/3) and has fever 102.3 here orally.  also tachycardic, rlq tender (cyst was on the left) pt also has MS.  she has guarding and rebound right sided, left side not tender.  GYN informed pt in ED upon presentation, plan to ct and eval. ecg non ischemic, gyn aware of patient

## 2022-11-07 ENCOUNTER — TRANSCRIPTION ENCOUNTER (OUTPATIENT)
Age: 32
End: 2022-11-07

## 2022-11-07 LAB
ANION GAP SERPL CALC-SCNC: 14 MMOL/L — SIGNIFICANT CHANGE UP (ref 7–14)
APPEARANCE UR: CLEAR — SIGNIFICANT CHANGE UP
BACTERIA # UR AUTO: NEGATIVE — SIGNIFICANT CHANGE UP
BASOPHILS # BLD AUTO: 0.02 K/UL — SIGNIFICANT CHANGE UP (ref 0–0.2)
BASOPHILS NFR BLD AUTO: 0.1 % — SIGNIFICANT CHANGE UP (ref 0–2)
BILIRUB UR-MCNC: NEGATIVE — SIGNIFICANT CHANGE UP
BUN SERPL-MCNC: 4 MG/DL — LOW (ref 7–23)
CALCIUM SERPL-MCNC: 8.1 MG/DL — LOW (ref 8.4–10.5)
CHLORIDE SERPL-SCNC: 103 MMOL/L — SIGNIFICANT CHANGE UP (ref 98–107)
CO2 SERPL-SCNC: 20 MMOL/L — LOW (ref 22–31)
COLOR SPEC: COLORLESS — SIGNIFICANT CHANGE UP
CREAT SERPL-MCNC: 0.54 MG/DL — SIGNIFICANT CHANGE UP (ref 0.5–1.3)
CULTURE RESULTS: SIGNIFICANT CHANGE UP
DIFF PNL FLD: ABNORMAL
EGFR: 127 ML/MIN/1.73M2 — SIGNIFICANT CHANGE UP
EOSINOPHIL # BLD AUTO: 0.07 K/UL — SIGNIFICANT CHANGE UP (ref 0–0.5)
EOSINOPHIL NFR BLD AUTO: 0.4 % — SIGNIFICANT CHANGE UP (ref 0–6)
EPI CELLS # UR: 2 /HPF — SIGNIFICANT CHANGE UP (ref 0–5)
GLUCOSE SERPL-MCNC: 87 MG/DL — SIGNIFICANT CHANGE UP (ref 70–99)
GLUCOSE UR QL: NEGATIVE — SIGNIFICANT CHANGE UP
HCT VFR BLD CALC: 32.9 % — LOW (ref 34.5–45)
HGB BLD-MCNC: 10.5 G/DL — LOW (ref 11.5–15.5)
IANC: 14.99 K/UL — HIGH (ref 1.8–7.4)
IMM GRANULOCYTES NFR BLD AUTO: 0.8 % — SIGNIFICANT CHANGE UP (ref 0–0.9)
KETONES UR-MCNC: NEGATIVE — SIGNIFICANT CHANGE UP
LEUKOCYTE ESTERASE UR-ACNC: NEGATIVE — SIGNIFICANT CHANGE UP
LYMPHOCYTES # BLD AUTO: 0.75 K/UL — LOW (ref 1–3.3)
LYMPHOCYTES # BLD AUTO: 4.5 % — LOW (ref 13–44)
MCHC RBC-ENTMCNC: 28.2 PG — SIGNIFICANT CHANGE UP (ref 27–34)
MCHC RBC-ENTMCNC: 31.9 GM/DL — LOW (ref 32–36)
MCV RBC AUTO: 88.2 FL — SIGNIFICANT CHANGE UP (ref 80–100)
MONOCYTES # BLD AUTO: 0.58 K/UL — SIGNIFICANT CHANGE UP (ref 0–0.9)
MONOCYTES NFR BLD AUTO: 3.5 % — SIGNIFICANT CHANGE UP (ref 2–14)
NEUTROPHILS # BLD AUTO: 14.99 K/UL — HIGH (ref 1.8–7.4)
NEUTROPHILS NFR BLD AUTO: 90.7 % — HIGH (ref 43–77)
NITRITE UR-MCNC: NEGATIVE — SIGNIFICANT CHANGE UP
NRBC # BLD: 0 /100 WBCS — SIGNIFICANT CHANGE UP (ref 0–0)
NRBC # FLD: 0 K/UL — SIGNIFICANT CHANGE UP (ref 0–0)
PH UR: 6.5 — SIGNIFICANT CHANGE UP (ref 5–8)
PLATELET # BLD AUTO: 227 K/UL — SIGNIFICANT CHANGE UP (ref 150–400)
POTASSIUM SERPL-MCNC: 3.6 MMOL/L — SIGNIFICANT CHANGE UP (ref 3.5–5.3)
POTASSIUM SERPL-SCNC: 3.6 MMOL/L — SIGNIFICANT CHANGE UP (ref 3.5–5.3)
PROT UR-MCNC: NEGATIVE — SIGNIFICANT CHANGE UP
RBC # BLD: 3.73 M/UL — LOW (ref 3.8–5.2)
RBC # FLD: 12.7 % — SIGNIFICANT CHANGE UP (ref 10.3–14.5)
RBC CASTS # UR COMP ASSIST: 1 /HPF — SIGNIFICANT CHANGE UP (ref 0–4)
SODIUM SERPL-SCNC: 137 MMOL/L — SIGNIFICANT CHANGE UP (ref 135–145)
SP GR SPEC: 1.01 — LOW (ref 1.01–1.05)
SPECIMEN SOURCE: SIGNIFICANT CHANGE UP
UROBILINOGEN FLD QL: SIGNIFICANT CHANGE UP
WBC # BLD: 16.54 K/UL — HIGH (ref 3.8–10.5)
WBC # FLD AUTO: 16.54 K/UL — HIGH (ref 3.8–10.5)
WBC UR QL: 1 /HPF — SIGNIFICANT CHANGE UP (ref 0–5)

## 2022-11-07 PROCEDURE — 99232 SBSQ HOSP IP/OBS MODERATE 35: CPT | Mod: 24

## 2022-11-07 PROCEDURE — 99222 1ST HOSP IP/OBS MODERATE 55: CPT | Mod: GC

## 2022-11-07 RX ORDER — KETOROLAC TROMETHAMINE 30 MG/ML
30 SYRINGE (ML) INJECTION EVERY 8 HOURS
Refills: 0 | Status: DISCONTINUED | OUTPATIENT
Start: 2022-11-07 | End: 2022-11-10

## 2022-11-07 RX ORDER — SODIUM CHLORIDE 9 MG/ML
1000 INJECTION, SOLUTION INTRAVENOUS
Refills: 0 | Status: DISCONTINUED | OUTPATIENT
Start: 2022-11-07 | End: 2022-11-08

## 2022-11-07 RX ORDER — ONDANSETRON 8 MG/1
4 TABLET, FILM COATED ORAL ONCE
Refills: 0 | Status: COMPLETED | OUTPATIENT
Start: 2022-11-07 | End: 2022-11-07

## 2022-11-07 RX ORDER — ACETAMINOPHEN 500 MG
975 TABLET ORAL EVERY 6 HOURS
Refills: 0 | Status: DISCONTINUED | OUTPATIENT
Start: 2022-11-07 | End: 2022-11-07

## 2022-11-07 RX ORDER — ACETAMINOPHEN 500 MG
1000 TABLET ORAL EVERY 6 HOURS
Refills: 0 | Status: COMPLETED | OUTPATIENT
Start: 2022-11-07 | End: 2022-11-08

## 2022-11-07 RX ORDER — IBUPROFEN 200 MG
600 TABLET ORAL EVERY 6 HOURS
Refills: 0 | Status: DISCONTINUED | OUTPATIENT
Start: 2022-11-07 | End: 2022-11-07

## 2022-11-07 RX ORDER — OXYCODONE HYDROCHLORIDE 5 MG/1
5 TABLET ORAL ONCE
Refills: 0 | Status: DISCONTINUED | OUTPATIENT
Start: 2022-11-07 | End: 2022-11-07

## 2022-11-07 RX ADMIN — Medication 30 MILLIGRAM(S): at 14:40

## 2022-11-07 RX ADMIN — SODIUM CHLORIDE 75 MILLILITER(S): 9 INJECTION, SOLUTION INTRAVENOUS at 22:55

## 2022-11-07 RX ADMIN — Medication 1000 MILLIGRAM(S): at 06:20

## 2022-11-07 RX ADMIN — ONDANSETRON 4 MILLIGRAM(S): 8 TABLET, FILM COATED ORAL at 10:55

## 2022-11-07 RX ADMIN — OXYCODONE HYDROCHLORIDE 5 MILLIGRAM(S): 5 TABLET ORAL at 20:58

## 2022-11-07 RX ADMIN — Medication 15 MILLIGRAM(S): at 04:14

## 2022-11-07 RX ADMIN — HEPARIN SODIUM 5000 UNIT(S): 5000 INJECTION INTRAVENOUS; SUBCUTANEOUS at 22:53

## 2022-11-07 RX ADMIN — Medication 400 MILLIGRAM(S): at 05:48

## 2022-11-07 RX ADMIN — Medication 1000 MILLIGRAM(S): at 13:00

## 2022-11-07 RX ADMIN — Medication 1000 MILLIGRAM(S): at 18:30

## 2022-11-07 RX ADMIN — Medication 15 MILLIGRAM(S): at 04:45

## 2022-11-07 RX ADMIN — HEPARIN SODIUM 5000 UNIT(S): 5000 INJECTION INTRAVENOUS; SUBCUTANEOUS at 11:32

## 2022-11-07 RX ADMIN — Medication 400 MILLIGRAM(S): at 18:01

## 2022-11-07 RX ADMIN — OXYCODONE HYDROCHLORIDE 5 MILLIGRAM(S): 5 TABLET ORAL at 21:58

## 2022-11-07 RX ADMIN — Medication 400 MILLIGRAM(S): at 23:42

## 2022-11-07 RX ADMIN — SODIUM CHLORIDE 125 MILLILITER(S): 9 INJECTION, SOLUTION INTRAVENOUS at 05:48

## 2022-11-07 RX ADMIN — PIPERACILLIN AND TAZOBACTAM 25 GRAM(S): 4; .5 INJECTION, POWDER, LYOPHILIZED, FOR SOLUTION INTRAVENOUS at 00:40

## 2022-11-07 RX ADMIN — Medication 30 MILLIGRAM(S): at 14:20

## 2022-11-07 RX ADMIN — Medication 400 MILLIGRAM(S): at 12:36

## 2022-11-07 NOTE — CONSULT NOTE ADULT - SUBJECTIVE AND OBJECTIVE BOX
General Surgery Consult  Consulting surgical team: B Surgery  Consulting attending: Dr. Castillo    HPI:  DARIO HENRIQUEZ  32y  Female [id number]    HPI:  32 year old  LMP 10/25 POD3 s/p LSC Left ovarian cystectomy presenting with fevers at home. Pt states she has been doing well post operatively however started to feel hot at home and took her temperature and found it to be 103. Pt endorsing right sided pelvic pain, however, she has been feeling that since her surgery and says that it is not new. Pt at home was ambulating without difficulty, passing gas, voiding well. Pt has been tolerating PO but endorses decreased appetite since surgery. Pt has not had a bowel movement since surgery. Pt denies nausea, vomiting. Pt denies chest pain, shortness of breath. Denies dysuria, frequency, urgency.   In the ER the pt was febrile to 102.8. She has been afebrile since being on the floor.       PAST MEDICAL HISTORY:  Anxiety    Thrombocytopenia    Multiple sclerosis        PAST SURGICAL HISTORY:  History of cholecystectomy        MEDICATIONS:  acetaminophen   IVPB .. 1000 milliGRAM(s) IV Intermittent once  acetaminophen   IVPB .. 1000 milliGRAM(s) IV Intermittent once  acetaminophen   IVPB .. 1000 milliGRAM(s) IV Intermittent once  heparin   Injectable 5000 Unit(s) SubCutaneous every 12 hours  ketorolac   Injectable 15 milliGRAM(s) IV Push every 6 hours  lactated ringers. 1000 milliLiter(s) IV Continuous <Continuous>  polyethylene glycol 3350 17 Gram(s) Oral daily  senna 2 Tablet(s) Oral at bedtime      ALLERGIES:  sulfa drugs (Hives)      VITALS & I/Os:  Vital Signs Last 24 Hrs  T(C): 36.7 (2022 23:58), Max: 39.3 (2022 17:00)  T(F): 98.1 (2022 23:58), Max: 102.8 (2022 17:00)  HR: 91 (2022 23:58) (89 - 122)  BP: 105/65 (2022 23:58) (105/65 - 130/80)  BP(mean): --  RR: 18 (2022 23:58) (16 - 20)  SpO2: 98% (2022 23:58) (97% - 100%)    Parameters below as of 2022 23:58  Patient On (Oxygen Delivery Method): room air        I&O's Summary    2022 07:01  -  2022 03:33  --------------------------------------------------------  IN: 475 mL / OUT: 0 mL / NET: 475 mL        PHYSICAL EXAM:  General: No acute distress  Respiratory: Nonlabored  Cardiovascular: RRR  Abdominal: Soft, incision sites healing well, nondistended, R sided abdominal tenderness. No rebound or guarding. No organomegaly, no palpable mass.  Extremities: Warm    LABS:                        12.6   9.49  )-----------( 285      ( 2022 17:20 )             39.7     11    135  |  99  |  4<L>  ----------------------------<  98  3.4<L>   |  25  |  0.68    Ca    9.2      2022 17:20    TPro  8.4<H>  /  Alb  3.8  /  TBili  0.6  /  DBili  x   /  AST  18  /  ALT  17  /  AlkPhos  64  11-    Lactate:   @ 17:35  1.9    PT/INR - ( 2022 17:20 )   PT: 14.7 sec;   INR: 1.26 ratio         PTT - ( 2022 17:20 )  PTT:27.8 sec          Urinalysis Basic - ( 2022 23:00 )    Color: Colorless / Appearance: Clear / S.007 / pH: x  Gluc: x / Ketone: Negative  / Bili: Negative / Urobili: <2 mg/dL   Blood: x / Protein: Negative / Nitrite: Negative   Leuk Esterase: Negative / RBC: 1 /HPF / WBC 1 /HPF   Sq Epi: x / Non Sq Epi: 2 /HPF / Bacteria: Negative        IMAGING:  < from: CT Abdomen and Pelvis w/ IV Cont (22 @ 18:53) >  FINDINGS:  LOWER CHEST: Bilateral trace pleural effusions, right more the left.    LIVER: Within normal limits.  BILE DUCTS: Normal caliber.  GALLBLADDER: Cholecystectomy.  SPLEEN: Within normal limits.  PANCREAS: Within normal limits.  ADRENALS: Within normal limits.  KIDNEYS/URETERS: Symmetrically enhancing parenchyma. No hydronephrosis.    BLADDER: Within normal limits.  REPRODUCTIVE ORGANS: Uterus and adnexa within normal limits. Interval   left adnexal cystectomy.    BOWEL: No bowel obstruction. Appendix is normal.  PERITONEUM: Trace pelvic fluid. Mild left lower quadrant and anterior   lower abdomen peritoneal thickening. Prominent fat stranding with   inflammatory changes along the right paracolic gutter likely represent   postsurgical changes/omental infarct in keeping with the history of   recent instrumentation. No rim-enhancing fluid collection to suggest an   abscess.  VESSELS: Within normal limits.  RETROPERITONEUM/LYMPH NODES: No enlarged lymph nodes measuring greater   than 10 mm at short axis.  ABDOMINAL WALL: Postsurgical changes.  BONES: Within normal limits.    IMPRESSION:    Interval left adnexal cystectomy with associated fat stranding and   inflammatory changes more prominent along the right paracolic gutter   suggesting postsurgical changes. Superimposed omental infarct and or   early infection can also be considered.    No drainable fluid collection or an abscess.    < end of copied text >

## 2022-11-07 NOTE — DISCHARGE NOTE PROVIDER - HOSPITAL COURSE
33 y/o  POD#3 s/p laparoscopic left ovarian cystectomy on 11/3 presented with fevers     Patient presented for scheduled procedure. She underwent uncomplicated ***. Please see operative note for full details. Patient was transferred to recovery room in stable condition.  EBL: ***. Hct:***     On HD#0, patient CT showed post operative changes vs. omental infarction. Patient started on IV Zosyn  On HD#1, routine post operative care was performed.         POD#2 routine post-operative care was performed.  No notable events.   On POD#3 patient was deemed stable for discharge as she was meeting postoperative milestones - tolerating regular diet, ambulating without difficulty, voiding, and pain was well controlled on oral medications. Throughout admission, patient received prophylactic anticoagulation.  Patient was instructed to follow up with  [X] in 2 weeks. 31 y/o  s/p laparoscopic left ovarian cystectomy on 11/3 presented with fevers on POD#3, underwent uncomplicated dx lsc on  which revealed inflamed omentum, no bowel injury, no fecal material or bile. Please see operative note for full details. Patient was transferred to recovery room in stable condition.  EBL: 5. Hct:39.7->32.9->33.7->32.4  WBC: 9.49->16.54->21.54->15.84->19.39    On HD#1, patient CT showed post operative changes vs. omental infarction. Patient started on IV Zosyn (-11).      On POD#2 from dx lsc patient was deemed stable for discharge as she was meeting postoperative milestones - tolerating regular diet, ambulating without difficulty, voiding, and pain was well controlled on oral medications. Throughout admission, patient received prophylactic anticoagulation. Pt was started on PO Augmentin on , prescribed a 7d course of Abx.  Patient was instructed to follow up with Dr. Vazquez in 1w.

## 2022-11-07 NOTE — CONSULT NOTE ADULT - ASSESSMENT
32 year old F POD3 s/p laparoscopic Left ovarian cystectomy presenting with fevers for 1 day. CT scan demonstrating postoperative changes w/ concern for omental infarct.    -no acute surgical intervention  -NSAIDs  -serial abdominal exams  -care per GYN    Discussed w/ Dr. Jonathan WEBER Surgery, [id number] 32 year old F POD3 s/p laparoscopic Left ovarian cystectomy presenting with fevers for 1 day. CT scan demonstrating postoperative changes w/ concern for omental infarct.    -no acute surgical intervention  -NSAIDs  -pain control  -serial abdominal exams  -care per GYN  -will follow    Discussed w/ Dr. Jonathan WEBER Surgery, [id number]

## 2022-11-07 NOTE — DISCHARGE NOTE PROVIDER - NSDCMRMEDTOKEN_GEN_ALL_CORE_FT
acetaminophen 325 mg oral tablet: 3 tab(s) orally every 6 hours  ibuprofen 600 mg oral tablet: 1 tab(s) orally every 6 hours  oxyCODONE 5 mg oral tablet: 1 tab(s) orally every 8 hours MDD:3 tabs per day  Prenatal Multivitamins with Folic Acid 1 mg oral tablet: 1 tab(s) orally once a day   amoxicillin-clavulanate 875 mg-125 mg oral tablet: 1 tab(s) orally 2 times a day   ibuprofen 600 mg oral tablet: 1 tab(s) orally every 6 hours, As Needed  polyethylene glycol 3350 oral powder for reconstitution: 17 gram(s) orally once a day  Prenatal Multivitamins with Folic Acid 1 mg oral tablet: 1 tab(s) orally once a day  senna leaf extract oral tablet: 2 tab(s) orally once a day (at bedtime)  Tylenol 325 mg oral tablet: 3 tab(s) orally every 6 hours, As Needed

## 2022-11-07 NOTE — CONSULT NOTE ADULT - ATTENDING COMMENTS
Pt seen and examined. Agree with resident evaluation and plan.   Imp: Postsurgical abdominal pain and fevers likely related to right lower quadrant trocar site with possible omental infarction.  No GI symtpoms.  NSAIDs and serial abdominal exams.  Likely no surgical intervention.

## 2022-11-07 NOTE — PROGRESS NOTE ADULT - ASSESSMENT
A/P: 32y POD#4 s/p LSC L ovarian cystectomy re-admitted on POD#3 2/2 fevers, Tmax 39.3 on 11/6@5p. Pt afebrile overnight and asymptomatic this AM. Overall patient is stable and doing well.      Neuro: Pain well controlled. PO pain meds.   CV: Hemodynamically stable, AM labs include CBC/BMP  Pulm: Saturating well on room air, encourage oob/amb, encourage use of incentive spirometry  - CXR (11/6): clear lungs  GI: Pt NPO overnight, advanced to regular diet this AM  : UOP adequate, Voiding spontaneously  - UA (11/6): wnl  Heme: c/w HSQ, ambulation and SCDs for DVT ppx  FEN: LR@125.  replete electrolytes prn   ID: Afebrile overnight  - Continue Zosyn (11/6-), consider d/c when pt is 24h afebrile  - F/u UCx, BCx (11/6)  - CTAP (11/6): interval left adnexal cystectomy with associated fat stranding and inflammatory changes more prominent along the right paracolic gutter suggesting postsurgical changes. Superimposed omental infarct and/or early infection can also be considered. No drainable fluid collection or abscess.  - Gen surg recs: pain control, NSAIDs, no acute surgical intervention  Endo: No active issues   Dispo: Continue routine post-op care    Jayde Morrissey PGY1 A/P: 32y POD#4 s/p LSC L ovarian cystectomy re-admitted on POD#3 2/2 fevers, Tmax 39.3 on 11/6@5p. Pt afebrile overnight and asymptomatic this AM. Overall patient is stable and doing well.      Neuro: Pain well controlled, pt currently on IV Tylenol and Toradol  CV: Hemodynamically stable, AM labs include CBC/BMP  Pulm: Saturating well on room air, encourage oob/amb, encourage use of incentive spirometry  - CXR (11/6): clear lungs  GI: Pt NPO overnight, advanced to regular diet this AM  : UOP adequate, Voiding spontaneously  - UA (11/6): wnl  Heme: c/w HSQ, ambulation and SCDs for DVT ppx  FEN: LR@125.  replete electrolytes prn   ID: Afebrile overnight  - Continue Zosyn (11/6-), consider d/c when pt is 24h afebrile  - F/u UCx, BCx (11/6)  - CTAP (11/6): interval left adnexal cystectomy with associated fat stranding and inflammatory changes more prominent along the right paracolic gutter suggesting postsurgical changes. Superimposed omental infarct and/or early infection can also be considered. No drainable fluid collection or abscess.  - Gen surg recs: pain control, NSAIDs, no acute surgical intervention  Endo: No active issues   Dispo: Continue routine post-op care    Jayde Morrissey PGY1

## 2022-11-07 NOTE — DISCHARGE NOTE PROVIDER - CARE PROVIDER_API CALL
Bertha Vazquez (MD)  Obstetrics and Gynecology  [facility] [place] [address]  Phone: [phone]  Fax: [phone]  Follow Up Time: 1 week

## 2022-11-07 NOTE — DISCHARGE NOTE PROVIDER - CARE PROVIDERS DIRECT ADDRESSES
[email or web address]

## 2022-11-07 NOTE — DISCHARGE NOTE PROVIDER - NSDCFUSCHEDAPPT_GEN_ALL_CORE_FT
Aramis Rivera  [facility]  [facility] [address]  Scheduled Appointment: 12/08/2022

## 2022-11-08 LAB
ANION GAP SERPL CALC-SCNC: 10 MMOL/L — SIGNIFICANT CHANGE UP (ref 7–14)
BACTERIA UR CULT: NORMAL
BASOPHILS # BLD AUTO: 0.04 K/UL — SIGNIFICANT CHANGE UP (ref 0–0.2)
BASOPHILS NFR BLD AUTO: 0.2 % — SIGNIFICANT CHANGE UP (ref 0–2)
BUN SERPL-MCNC: 3 MG/DL — LOW (ref 7–23)
C TRACH RRNA SPEC QL NAA+PROBE: NOT DETECTED
CALCIUM SERPL-MCNC: 7.7 MG/DL — LOW (ref 8.4–10.5)
CANDIDA VAG CYTO: NOT DETECTED
CHLORIDE SERPL-SCNC: 104 MMOL/L — SIGNIFICANT CHANGE UP (ref 98–107)
CO2 SERPL-SCNC: 23 MMOL/L — SIGNIFICANT CHANGE UP (ref 22–31)
CREAT SERPL-MCNC: 0.46 MG/DL — LOW (ref 0.5–1.3)
EGFR: 132 ML/MIN/1.73M2 — SIGNIFICANT CHANGE UP
EOSINOPHIL # BLD AUTO: 0.11 K/UL — SIGNIFICANT CHANGE UP (ref 0–0.5)
EOSINOPHIL NFR BLD AUTO: 0.5 % — SIGNIFICANT CHANGE UP (ref 0–6)
G VAGINALIS+PREV SP MTYP VAG QL MICRO: DETECTED
GLUCOSE SERPL-MCNC: 69 MG/DL — LOW (ref 70–99)
HCT VFR BLD CALC: 33.7 % — LOW (ref 34.5–45)
HGB BLD-MCNC: 10.8 G/DL — LOW (ref 11.5–15.5)
IANC: 19.56 K/UL — HIGH (ref 1.8–7.4)
IMM GRANULOCYTES NFR BLD AUTO: 0.6 % — SIGNIFICANT CHANGE UP (ref 0–0.9)
LYMPHOCYTES # BLD AUTO: 1.09 K/UL — SIGNIFICANT CHANGE UP (ref 1–3.3)
LYMPHOCYTES # BLD AUTO: 5.1 % — LOW (ref 13–44)
MCHC RBC-ENTMCNC: 28.3 PG — SIGNIFICANT CHANGE UP (ref 27–34)
MCHC RBC-ENTMCNC: 32 GM/DL — SIGNIFICANT CHANGE UP (ref 32–36)
MCV RBC AUTO: 88.5 FL — SIGNIFICANT CHANGE UP (ref 80–100)
MONOCYTES # BLD AUTO: 0.6 K/UL — SIGNIFICANT CHANGE UP (ref 0–0.9)
MONOCYTES NFR BLD AUTO: 2.8 % — SIGNIFICANT CHANGE UP (ref 2–14)
N GONORRHOEA RRNA SPEC QL NAA+PROBE: NOT DETECTED
NEUTROPHILS # BLD AUTO: 19.56 K/UL — HIGH (ref 1.8–7.4)
NEUTROPHILS NFR BLD AUTO: 90.8 % — HIGH (ref 43–77)
NRBC # BLD: 0 /100 WBCS — SIGNIFICANT CHANGE UP (ref 0–0)
NRBC # FLD: 0 K/UL — SIGNIFICANT CHANGE UP (ref 0–0)
PLATELET # BLD AUTO: 239 K/UL — SIGNIFICANT CHANGE UP (ref 150–400)
POTASSIUM SERPL-MCNC: 3.3 MMOL/L — LOW (ref 3.5–5.3)
POTASSIUM SERPL-SCNC: 3.3 MMOL/L — LOW (ref 3.5–5.3)
RBC # BLD: 3.81 M/UL — SIGNIFICANT CHANGE UP (ref 3.8–5.2)
RBC # FLD: 12.9 % — SIGNIFICANT CHANGE UP (ref 10.3–14.5)
SODIUM SERPL-SCNC: 137 MMOL/L — SIGNIFICANT CHANGE UP (ref 135–145)
SOURCE AMPLIFICATION: NORMAL
T VAGINALIS VAG QL WET PREP: NOT DETECTED
WBC # BLD: 21.53 K/UL — HIGH (ref 3.8–10.5)
WBC # FLD AUTO: 21.53 K/UL — HIGH (ref 3.8–10.5)

## 2022-11-08 PROCEDURE — 44180 LAP ENTEROLYSIS: CPT | Mod: GC

## 2022-11-08 RX ORDER — ACETAMINOPHEN 500 MG
1000 TABLET ORAL ONCE
Refills: 0 | Status: COMPLETED | OUTPATIENT
Start: 2022-11-09 | End: 2022-11-09

## 2022-11-08 RX ORDER — ONDANSETRON 8 MG/1
4 TABLET, FILM COATED ORAL ONCE
Refills: 0 | Status: DISCONTINUED | OUTPATIENT
Start: 2022-11-08 | End: 2022-11-08

## 2022-11-08 RX ORDER — HYDROMORPHONE HYDROCHLORIDE 2 MG/ML
0.5 INJECTION INTRAMUSCULAR; INTRAVENOUS; SUBCUTANEOUS
Refills: 0 | Status: DISCONTINUED | OUTPATIENT
Start: 2022-11-08 | End: 2022-11-08

## 2022-11-08 RX ORDER — POTASSIUM CHLORIDE 20 MEQ
10 PACKET (EA) ORAL
Refills: 0 | Status: COMPLETED | OUTPATIENT
Start: 2022-11-08 | End: 2022-11-08

## 2022-11-08 RX ORDER — ACETAMINOPHEN 500 MG
1000 TABLET ORAL ONCE
Refills: 0 | Status: DISCONTINUED | OUTPATIENT
Start: 2022-11-08 | End: 2022-11-10

## 2022-11-08 RX ORDER — ENOXAPARIN SODIUM 100 MG/ML
40 INJECTION SUBCUTANEOUS EVERY 24 HOURS
Refills: 0 | Status: DISCONTINUED | OUTPATIENT
Start: 2022-11-08 | End: 2022-11-10

## 2022-11-08 RX ORDER — SODIUM CHLORIDE 9 MG/ML
1000 INJECTION, SOLUTION INTRAVENOUS
Refills: 0 | Status: DISCONTINUED | OUTPATIENT
Start: 2022-11-08 | End: 2022-11-08

## 2022-11-08 RX ADMIN — Medication 400 MILLIGRAM(S): at 05:51

## 2022-11-08 RX ADMIN — Medication 30 MILLIGRAM(S): at 01:48

## 2022-11-08 RX ADMIN — Medication 1000 MILLIGRAM(S): at 00:42

## 2022-11-08 RX ADMIN — ENOXAPARIN SODIUM 40 MILLIGRAM(S): 100 INJECTION SUBCUTANEOUS at 17:11

## 2022-11-08 RX ADMIN — Medication 100 MILLIEQUIVALENT(S): at 11:52

## 2022-11-08 RX ADMIN — Medication 30 MILLIGRAM(S): at 02:34

## 2022-11-08 RX ADMIN — Medication 100 MILLIEQUIVALENT(S): at 12:40

## 2022-11-08 NOTE — PROVIDER CONTACT NOTE (OTHER) - ACTION/TREATMENT ORDERED:
pt sent to OR without results. Above mentioned aware and in agreement, anesthesia Dr. Polo also aware. Report to [facility] OR Rn aware.

## 2022-11-08 NOTE — BRIEF OPERATIVE NOTE - OPERATION/FINDINGS
Diagnostic laparoscopy  Sebaceous material and hair seen across abdomen, with some inflamed omentum noted. Few adhesions to abdominal wall taken down. No bowel injury noted, no fecal material or bile seen

## 2022-11-08 NOTE — PRE-OP CHECKLIST - NSWEIGHTCALCTOOLDRUG_GEN_A_CORE
12 yo M with no significant past medical history presenting to the ED for evaluation of abdominal pain. Pain started about 3 hours ago when the patient was eating lunch. Unable to finish lunch. Reports diffuse abdominal pain but worse on the RLQ. Seen by PMD and referred here for further evaluation. No fever, cough, vomiting or diarrhea. No reports of nausea. No known sick contacts but the patient does attend school. NKDA. Ate some lunch at 12 pm.        Pediatric Social History:         History reviewed. No pertinent past medical history. History reviewed. No pertinent surgical history. History reviewed. No pertinent family history.     Social History     Socioeconomic History    Marital status: Not on file     Spouse name: Not on file    Number of children: Not on file    Years of education: Not on file    Highest education level: Not on file   Occupational History    Not on file   Social Needs    Financial resource strain: Not on file    Food insecurity:     Worry: Not on file     Inability: Not on file    Transportation needs:     Medical: Not on file     Non-medical: Not on file   Tobacco Use    Smoking status: Not on file   Substance and Sexual Activity    Alcohol use: Not on file    Drug use: Not on file    Sexual activity: Not on file   Lifestyle    Physical activity:     Days per week: Not on file     Minutes per session: Not on file    Stress: Not on file   Relationships    Social connections:     Talks on phone: Not on file     Gets together: Not on file     Attends Tenriism service: Not on file     Active member of club or organization: Not on file     Attends meetings of clubs or organizations: Not on file     Relationship status: Not on file    Intimate partner violence:     Fear of current or ex partner: Not on file     Emotionally abused: Not on file     Physically abused: Not on file     Forced sexual activity: Not on file   Other Topics Concern    Not on file Social History Narrative    Not on file         ALLERGIES: Patient has no known allergies. Review of Systems   Unable to perform ROS: Age   All other systems reviewed and are negative. Vitals:    09/27/19 1508 09/27/19 1510   BP:  101/61   Pulse:  98   Resp:  16   Temp:  98.1 °F (36.7 °C)   SpO2:  97%   Weight: 17.8 kg             Physical Exam   Constitutional: He appears well-developed and well-nourished. He is active. No distress. HENT:   Head: Atraumatic. Right Ear: Tympanic membrane normal.   Left Ear: Tympanic membrane normal.   Nose: Nose normal.   Mouth/Throat: Mucous membranes are moist. Dentition is normal. No oropharyngeal exudate. No tonsillar exudate. Oropharynx is clear. Pharynx is normal.   Eyes: Conjunctivae and EOM are normal. Right eye exhibits no discharge. Left eye exhibits no discharge. Neck: Normal range of motion. Neck supple. No neck rigidity or neck adenopathy. Cardiovascular: Normal rate, regular rhythm, S1 normal and S2 normal. Pulses are strong. No murmur heard. Pulmonary/Chest: Effort normal and breath sounds normal. There is normal air entry. No respiratory distress. Air movement is not decreased. He has no wheezes. He has no rhonchi. He exhibits no retraction. Abdominal: Soft. He exhibits no distension. Bowel sounds are decreased. There is tenderness in the right upper quadrant, right lower quadrant and left lower quadrant. There is guarding. There is no rigidity and no rebound. Pain most significant in the RLQ with mild guarding. Genitourinary: Testes normal and penis normal. Right testis shows no swelling and no tenderness. Left testis shows no swelling and no tenderness. Musculoskeletal: Normal range of motion. He exhibits no edema, tenderness or deformity. Neurological: He is alert. He exhibits normal muscle tone. Skin: Skin is warm. Capillary refill takes less than 2 seconds. No purpura noted. He is not diaphoretic. No jaundice or pallor. Nursing note and vitals reviewed. MDM  Number of Diagnoses or Management Options  Constipation, unspecified constipation type:   Diagnosis management comments: 14 yo M with abdominal pain. Pain is diffuse (RUQ, RLQ and LLQ) notably more severe in the RLQ. Will obtain blood work, give zofran, obtain XR to evaluate for stool burden and obtain US. CBC within normal limits and CRP negative. Patient appears more comfortable after zofran with less RLQ pain. US did not visualize the appendix but XR of the abdomen with marked fecal stasis. Will give enema and po challenge. Tolerated po challenge. Abdomen soft on my examination. Will discharge with miralax.        Amount and/or Complexity of Data Reviewed  Clinical lab tests: ordered and reviewed  Tests in the radiology section of CPT®: ordered and reviewed  Tests in the medicine section of CPT®: ordered and reviewed  Decide to obtain previous medical records or to obtain history from someone other than the patient: yes  Obtain history from someone other than the patient: yes  Review and summarize past medical records: yes  Independent visualization of images, tracings, or specimens: yes    Risk of Complications, Morbidity, and/or Mortality  Presenting problems: moderate  Diagnostic procedures: moderate  Management options: moderate    Patient Progress  Patient progress: improved         Procedures  used

## 2022-11-08 NOTE — CHART NOTE - NSCHARTNOTEFT_GEN_A_CORE
B Team (General Surgery) Daily Progress Note    SUBJECTIVE:  Pt seen and examined, and is resting comfortably in bed. No acute events in PACU. VSS. Pain is adequately controlled on current regimen. Pt has no complaints at this time. Pt denies CP, SOB, Changes in Vision, and Headaches. Pt reports she is not passing gas or having bowel movements. Pt reports she tolerated the procedure well.     OBJECTIVE:  Vital Signs Last 24 Hrs  T(C): 36.7 (08 Nov 2022 18:13), Max: 39.1 (07 Nov 2022 23:00)  T(F): 98 (08 Nov 2022 18:13), Max: 102.4 (07 Nov 2022 23:00)  HR: 90 (08 Nov 2022 18:13) (78 - 114)  BP: 112/73 (08 Nov 2022 18:13) (107/68 - 121/77)  BP(mean): 77 (08 Nov 2022 16:45) (72 - 84)  RR: 18 (08 Nov 2022 18:13) (12 - 20)  SpO2: 98% (08 Nov 2022 18:13) (95% - 100%)    Parameters below as of 08 Nov 2022 18:13  Patient On (Oxygen Delivery Method): room air        I&O's Detail    07 Nov 2022 07:01  -  08 Nov 2022 07:00  --------------------------------------------------------  IN:    IV PiggyBack: 200 mL    Lactated Ringers: 1500 mL    Oral Fluid: 1880 mL  Total IN: 3580 mL    OUT:    Stool (mL): 5 mL    Voided (mL): 0 mL  Total OUT: 5 mL    Total NET: 3575 mL      08 Nov 2022 07:01  -  08 Nov 2022 19:57  --------------------------------------------------------  IN:    Lactated Ringers: 500 mL    Oral Fluid: 540 mL  Total IN: 1040 mL    OUT:    Voided (mL): 400 mL  Total OUT: 400 mL    Total NET: 640 mL        Exam:  GEN: A&Ox3, NAD  HEENT: atraumatic, normocephalic  CV: no JVD  RESP: no increased work of breathing, no use of accessory muscles  GI/ABD: soft, appropriately tender, mildly distended, no rebound or guarding, incision is c/d/i  : deferred  EXTREMITIES: warm, pink, well-perfused                        10.8   21.53 )-----------( 239      ( 08 Nov 2022 06:01 )             33.7       11-08    137  |  104  |  3<L>  ----------------------------<  69<L>  3.3<L>   |  23  |  0.46<L>    Ca    7.7<L>      08 Nov 2022 06:01            ASSESSMENT:  32F s/p diagnostic laparoscopy with lysis of adhesions to abdominal wall. Pt tolerated the procedure well. No acute events in recovery. VSS. Pt meets criteria for floor level of care.     PLAN:    - Diet: Regular  - Activity: OOB/ambulation  - Incentive spirometry  - Pain Management PRN  - DVT prophylaxis  - Dispo: Floor  - Care per primary team        B Team Surgery  pager: [phone] B Team (General Surgery) Post Operative Check    SUBJECTIVE:  Pt seen and examined, and is resting comfortably in bed. No acute events in PACU. VSS. Pain is adequately controlled on current regimen. Pt has no complaints at this time. Pt denies CP, SOB, Changes in Vision, and Headaches. Pt reports she is not passing gas or having bowel movements. Pt reports she tolerated the procedure well.     OBJECTIVE:  Vital Signs Last 24 Hrs  T(C): 36.7 (08 Nov 2022 18:13), Max: 39.1 (07 Nov 2022 23:00)  T(F): 98 (08 Nov 2022 18:13), Max: 102.4 (07 Nov 2022 23:00)  HR: 90 (08 Nov 2022 18:13) (78 - 114)  BP: 112/73 (08 Nov 2022 18:13) (107/68 - 121/77)  BP(mean): 77 (08 Nov 2022 16:45) (72 - 84)  RR: 18 (08 Nov 2022 18:13) (12 - 20)  SpO2: 98% (08 Nov 2022 18:13) (95% - 100%)    Parameters below as of 08 Nov 2022 18:13  Patient On (Oxygen Delivery Method): room air        I&O's Detail    07 Nov 2022 07:01  -  08 Nov 2022 07:00  --------------------------------------------------------  IN:    IV PiggyBack: 200 mL    Lactated Ringers: 1500 mL    Oral Fluid: 1880 mL  Total IN: 3580 mL    OUT:    Stool (mL): 5 mL    Voided (mL): 0 mL  Total OUT: 5 mL    Total NET: 3575 mL      08 Nov 2022 07:01  -  08 Nov 2022 19:57  --------------------------------------------------------  IN:    Lactated Ringers: 500 mL    Oral Fluid: 540 mL  Total IN: 1040 mL    OUT:    Voided (mL): 400 mL  Total OUT: 400 mL    Total NET: 640 mL        Exam:  GEN: A&Ox3, NAD  HEENT: atraumatic, normocephalic  CV: no JVD  RESP: no increased work of breathing, no use of accessory muscles  GI/ABD: soft, appropriately tender, mildly distended, no rebound or guarding, incision is c/d/i  : deferred  EXTREMITIES: warm, pink, well-perfused                        10.8   21.53 )-----------( 239      ( 08 Nov 2022 06:01 )             33.7       11-08    137  |  104  |  3<L>  ----------------------------<  69<L>  3.3<L>   |  23  |  0.46<L>    Ca    7.7<L>      08 Nov 2022 06:01            ASSESSMENT:  32F s/p diagnostic laparoscopy with lysis of adhesions to abdominal wall. Pt tolerated the procedure well. No acute events in recovery. VSS. Pt meets criteria for floor level of care.     PLAN:    - Diet: Regular  - Activity: OOB/ambulation  - Incentive spirometry  - Pain Management PRN  - DVT prophylaxis  - Dispo: Floor  - Care per primary team        B Team Surgery  pager: [phone]

## 2022-11-08 NOTE — PROGRESS NOTE ADULT - SUBJECTIVE AND OBJECTIVE BOX
R3 GYN Progress Note    POD#5   HD#4    Patient seen and examined at bedside.  Patient febrile overnight to 39.1. Pain better controlled.  Patient is ambulating and tolerating regular diet however appetite is much less than prior  Patient is passing flatus.    Patient is voiding spontaneously   Denies CP, SOB, N/V    Vital Signs Last 24 Hours  T(C): 36.7 (11-08-22 @ 05:53), Max: 39.1 (11-07-22 @ 23:00)  HR: 94 (11-08-22 @ 05:53) (89 - 114)  BP: 112/72 (11-08-22 @ 05:53) (108/72 - 127/75)  RR: 17 (11-08-22 @ 05:53) (17 - 20)  SpO2: 98% (11-08-22 @ 05:53) (98% - 100%)    I&O's Summary    07 Nov 2022 07:01  -  08 Nov 2022 07:00  --------------------------------------------------------  IN: 3580 mL / OUT: 5 mL / NET: 3575 mL        Physical Exam:  General: NAD  CV: RRR  Lungs: CTA b/l  Abdomen: Soft, mildly tender, mildly distended, tympanic, normoactive bowel sounds  Incision: 3 lsc port sites with steri strips in place   : no bleeding on pad  Ext: No pain or swelling in lower extremities bilaterally     Labs:                        10.8   x     )-----------( 239      ( 08 Nov 2022 06:01 )             33.7   baso x      eos x      imm gran x      lymph x      mono x      poly x                            10.5   16.54 )-----------( 227      ( 07 Nov 2022 07:01 )             32.9   baso 0.1    eos 0.4    imm gran 0.8    lymph 4.5    mono 3.5    poly 90.7                         12.6   9.49  )-----------( 285      ( 06 Nov 2022 17:20 )             39.7   baso 0.0    eos 0.0    imm gran x      lymph 4.3    mono 0.9    poly 92.2       MEDICATIONS  (STANDING):  heparin   Injectable 5000 Unit(s) SubCutaneous every 12 hours  lactated ringers. 1000 milliLiter(s) (125 mL/Hr) IV Continuous <Continuous>  polyethylene glycol 3350 17 Gram(s) Oral daily  senna 2 Tablet(s) Oral at bedtime    MEDICATIONS  (PRN):  ketorolac   Injectable 30 milliGRAM(s) IV Push every 8 hours PRN Moderate Pain (4 - 6)

## 2022-11-08 NOTE — PROGRESS NOTE ADULT - ASSESSMENT
A/P: 32y POD# 5 s/p LSC L ovarian cystectomy re-admitted on POD#3 2/2 fevers, Tmax 39.3 on 11/6@5p. Pt afebrile overnight and asymptomatic this AM. Overall patient is stable and doing well.      Neuro: Pain better controlled, pt currently on IV Tylenol and Toradol  CV: Hemodynamically stable, AM labs include CBC/BMP, however with a notable increase in WBC yesterday   Pulm: Saturating well on room air, encourage oob/amb, encourage use of incentive spirometry  - CXR (11/6): clear lungs  GI: Patient on regular diet, tolerating, however will make patient NPO again as per recommendations from Gen surgery for possible diag laparoscopy   : UOP adequate, Voiding spontaneously  - UA (11/6): wnl  Heme: c/w HSQ, ambulation and SCDs for DVT ppx  FEN: LR@125.  replete electrolytes prn   ID: Febrile overnight to 39.1C  - Continue Zosyn (11/6-),   - F/u UCx, BCx (11/6)  - CTAP (11/6): interval left adnexal cystectomy with associated fat stranding and inflammatory changes more prominent along the right paracolic gutter suggesting postsurgical changes. Superimposed omental infarct and/or early infection can also be considered. No drainable fluid collection or abscess.  - Gen surg recs: pain control, NSAIDs, may require a diagnostic laparoscopy as patient is having continuous fevers and increasing WBC   Endo: No active issues   Dispo: Continue routine post-op care, pending recs from general surgery     Darleen Uribe, PGY3

## 2022-11-08 NOTE — CHART NOTE - NSCHARTNOTEFT_GEN_A_CORE
Patient is planned for emergency surgery. This is a bumping emergency we are concerned about missed bowel injury. This is a life threatening emergency and waiting for a type and screen and beta hcg will not alter management. Delaying surgery may lead to worse outcome and serious morbidity.

## 2022-11-09 LAB
ANION GAP SERPL CALC-SCNC: 11 MMOL/L — SIGNIFICANT CHANGE UP (ref 7–14)
BUN SERPL-MCNC: 6 MG/DL — LOW (ref 7–23)
CALCIUM SERPL-MCNC: 8.4 MG/DL — SIGNIFICANT CHANGE UP (ref 8.4–10.5)
CHLORIDE SERPL-SCNC: 104 MMOL/L — SIGNIFICANT CHANGE UP (ref 98–107)
CO2 SERPL-SCNC: 22 MMOL/L — SIGNIFICANT CHANGE UP (ref 22–31)
CREAT SERPL-MCNC: 0.42 MG/DL — LOW (ref 0.5–1.3)
EGFR: 135 ML/MIN/1.73M2 — SIGNIFICANT CHANGE UP
GLUCOSE SERPL-MCNC: 113 MG/DL — HIGH (ref 70–99)
HCT VFR BLD CALC: 32.4 % — LOW (ref 34.5–45)
HGB BLD-MCNC: 10.5 G/DL — LOW (ref 11.5–15.5)
MAGNESIUM SERPL-MCNC: 1.9 MG/DL — SIGNIFICANT CHANGE UP (ref 1.6–2.6)
MCHC RBC-ENTMCNC: 28.2 PG — SIGNIFICANT CHANGE UP (ref 27–34)
MCHC RBC-ENTMCNC: 32.4 GM/DL — SIGNIFICANT CHANGE UP (ref 32–36)
MCV RBC AUTO: 86.9 FL — SIGNIFICANT CHANGE UP (ref 80–100)
NRBC # BLD: 0 /100 WBCS — SIGNIFICANT CHANGE UP (ref 0–0)
NRBC # FLD: 0 K/UL — SIGNIFICANT CHANGE UP (ref 0–0)
PHOSPHATE SERPL-MCNC: 2.6 MG/DL — SIGNIFICANT CHANGE UP (ref 2.5–4.5)
PLATELET # BLD AUTO: 284 K/UL — SIGNIFICANT CHANGE UP (ref 150–400)
POTASSIUM SERPL-MCNC: 4.1 MMOL/L — SIGNIFICANT CHANGE UP (ref 3.5–5.3)
POTASSIUM SERPL-SCNC: 4.1 MMOL/L — SIGNIFICANT CHANGE UP (ref 3.5–5.3)
RBC # BLD: 3.73 M/UL — LOW (ref 3.8–5.2)
RBC # FLD: 13 % — SIGNIFICANT CHANGE UP (ref 10.3–14.5)
SODIUM SERPL-SCNC: 137 MMOL/L — SIGNIFICANT CHANGE UP (ref 135–145)
SURGICAL PATHOLOGY STUDY: SIGNIFICANT CHANGE UP
WBC # BLD: 15.84 K/UL — HIGH (ref 3.8–10.5)
WBC # FLD AUTO: 15.84 K/UL — HIGH (ref 3.8–10.5)

## 2022-11-09 RX ADMIN — ENOXAPARIN SODIUM 40 MILLIGRAM(S): 100 INJECTION SUBCUTANEOUS at 18:34

## 2022-11-09 RX ADMIN — Medication 30 MILLIGRAM(S): at 21:00

## 2022-11-09 RX ADMIN — Medication 1 TABLET(S): at 18:33

## 2022-11-09 RX ADMIN — Medication 400 MILLIGRAM(S): at 02:19

## 2022-11-09 RX ADMIN — Medication 30 MILLIGRAM(S): at 20:21

## 2022-11-09 RX ADMIN — Medication 400 MILLIGRAM(S): at 08:55

## 2022-11-09 NOTE — PROGRESS NOTE ADULT - ASSESSMENT
A/P: 32y POD#6 s/p LSC L ovarian cystectomy re-admitted on POD#3 2/2 fevers, Tmax 39.3 on 11/6@5p. POD#1 now from Dx LSC and VINCENT with gen surg, no bowel injury noted. Pt afebrile overnight and asymptomatic this AM. Overall patient is stable and doing well.      Neuro: Pain well controlled, pt currently on IV Tylenol and Toradol  CV: Hemodynamically stable, AM labs include CBC/BMP/Mg/Phos, of note WBC uptrending to 21.53 yesterday  Pulm: Saturating well on room air, encourage oob/amb, encourage use of incentive spirometry  - CXR (11/6): clear lungs  GI: Patient tolerating regular diet  : UOP adequate, Voiding spontaneously  - UA (11/6): wnl  Heme: c/w HSQ, ambulation and SCDs for DVT ppx  FEN: LR@125.  replete electrolytes prn   ID: Afebrile overnight, Tmax 39.C 11/6@5p  - Continue Zosyn (11/6-)  - UCx neg (11/6), BCx-p neg (11/6), f/u BCx-f  - CTAP (11/6): interval left adnexal cystectomy with associated fat stranding and inflammatory changes more prominent along the right paracolic gutter suggesting postsurgical changes. Superimposed omental infarct and/or early infection can also be considered. No drainable fluid collection or abscess.  - Gen surg recs: pain control, NSAIDs, OR 11/8 for DX LSC: inflamed omentum, no bowel injury, no fecal material or bile, sebaceous material and hair seen across abdomen  Endo: No active issues   Dispo: Continue routine post-op care    Jayde Morrissey PGY1   A/P: 32y POD#6 s/p LSC L ovarian cystectomy re-admitted on POD#3 2/2 fevers, Tmax 39.3 on 11/6@5p. POD#1 now from Dx LSC and VINCENT with gen surg, no bowel injury noted. Pt afebrile overnight and asymptomatic this AM. Overall patient is stable and doing well.      Neuro: Pain well controlled, pt currently on IV Tylenol and Toradol  CV: Hemodynamically stable, AM labs include CBC/BMP/Mg/Phos pending, of note WBC uptrending to 21.53 yesterday  Pulm: Saturating well on room air, encourage oob/amb, encourage use of incentive spirometry  - CXR (11/6): clear lungs  GI: Patient tolerating regular diet  : UOP adequate, Voiding spontaneously  - UA (11/6): wnl  Heme: c/w HSQ, ambulation and SCDs for DVT ppx  FEN: SLIV  replete electrolytes prn   ID: Afebrile overnight, Tmax 39.C 11/6@5p  - Continue Zosyn (11/6-)  - UCx neg (11/6), BCx-p neg (11/6), f/u BCx-f  - CTAP (11/6): interval left adnexal cystectomy with associated fat stranding and inflammatory changes more prominent along the right paracolic gutter suggesting postsurgical changes. Superimposed omental infarct and/or early infection can also be considered. No drainable fluid collection or abscess.  - Gen surg recs: pain control, NSAIDs, OR 11/8 for DX LSC: inflamed omentum, no bowel injury, no fecal material or bile, sebaceous material and hair seen across abdomen  Endo: No active issues   Dispo: Continue routine post-op care    Jayde Morrissey PGY1  Darleen Uribe, PGY3

## 2022-11-09 NOTE — PROGRESS NOTE ADULT - SUBJECTIVE AND OBJECTIVE BOX
R1 GYN Progress Note    [place] [facility] [place] [facility] [place]   [place]#4    Patient seen and examined at bedside. Pt afebrile overnight, reports she slept well. Pain well controlled.  Patient is ambulating and tolerating regular diet, reports she ate chicken last night.  Patient is passing flatus.    Patient is voiding spontaneously   Denies CP, SOB, N/V, fevers, chills.    Vital Signs Last 24 Hours  T(C): 36.7 (11-08-22 @ 05:53), Max: 39.1 (11-07-22 @ 23:00)  HR: 94 (11-08-22 @ 05:53) (89 - 114)  BP: 112/72 (11-08-22 @ 05:53) (108/72 - 127/75)  RR: 17 (11-08-22 @ 05:53) (17 - 20)  SpO2: 98% (11-08-22 @ 05:53) (98% - 100%)    I&O's Summary    07 Nov 2022 07:01  -  08 Nov 2022 07:00  --------------------------------------------------------  IN: 3580 mL / OUT: 5 mL / NET: 3575 mL        Physical Exam:  General: NAD  CV: RRR  Lungs: CTA b/l  Abdomen: Soft, mildly tender, mildly distended, tympanic, normoactive bowel sounds  Incision: 3 lsc port sites c/d/i  : no bleeding on pad  Ext: No pain or swelling in lower extremities bilaterally     Labs:                        10.8   x     )-----------( 239      ( 08 Nov 2022 06:01 )             33.7   baso x      eos x      imm gran x      lymph x      mono x      poly x                            10.5   16.54 )-----------( 227      ( 07 Nov 2022 07:01 )             32.9   baso 0.1    eos 0.4    imm gran 0.8    lymph 4.5    mono 3.5    poly 90.7                         12.6   9.49  )-----------( 285      ( 06 Nov 2022 17:20 )             39.7   baso 0.0    eos 0.0    imm gran x      lymph 4.3    mono 0.9    poly 92.2       MEDICATIONS  (STANDING):  heparin   Injectable 5000 Unit(s) SubCutaneous every 12 hours  lactated ringers. 1000 milliLiter(s) (125 mL/Hr) IV Continuous <Continuous>  polyethylene glycol 3350 17 Gram(s) Oral daily  senna 2 Tablet(s) Oral at bedtime    MEDICATIONS  (PRN):  ketorolac   Injectable 30 milliGRAM(s) IV Push every 8 hours PRN Moderate Pain (4 - 6)         R1 GYN Progress Note    POD#6 from [facility] [place], POD#1 from [place]x [facility] [place]   HD#4    Patient seen and examined at bedside. Pt afebrile overnight, reports she slept well. Pain well controlled.  Patient is ambulating and tolerating regular diet, reports she ate chicken last night.  Patient is passing flatus.    Patient is voiding spontaneously   Denies CP, SOB, N/V, fevers, chills.    Vital Signs Last 24 Hours  T(C): 36.7 (11-08-22 @ 05:53), Max: 39.1 (11-07-22 @ 23:00)  HR: 94 (11-08-22 @ 05:53) (89 - 114)  BP: 112/72 (11-08-22 @ 05:53) (108/72 - 127/75)  RR: 17 (11-08-22 @ 05:53) (17 - 20)  SpO2: 98% (11-08-22 @ 05:53) (98% - 100%)    I&O's Summary    07 Nov 2022 07:01  -  08 Nov 2022 07:00  --------------------------------------------------------  IN: 3580 mL / OUT: 5 mL / NET: 3575 mL        Physical Exam:  General: NAD  CV: RRR  Lungs: CTA b/l  Abdomen: Soft, mildly tender, mildly distended, tympanic, normoactive bowel sounds  Incision: 4 lsc port sites c/d/i  : no bleeding on pad  Ext: No pain or swelling in lower extremities bilaterally     Labs:                        10.8   x     )-----------( 239      ( 08 Nov 2022 06:01 )             33.7   baso x      eos x      imm gran x      lymph x      mono x      poly x                            10.5   16.54 )-----------( 227      ( 07 Nov 2022 07:01 )             32.9   baso 0.1    eos 0.4    imm gran 0.8    lymph 4.5    mono 3.5    poly 90.7                         12.6   9.49  )-----------( 285      ( 06 Nov 2022 17:20 )             39.7   baso 0.0    eos 0.0    imm gran x      lymph 4.3    mono 0.9    poly 92.2       MEDICATIONS  (STANDING):  heparin   Injectable 5000 Unit(s) SubCutaneous every 12 hours  lactated ringers. 1000 milliLiter(s) (125 mL/Hr) IV Continuous <Continuous>  polyethylene glycol 3350 17 Gram(s) Oral daily  senna 2 Tablet(s) Oral at bedtime    MEDICATIONS  (PRN):  ketorolac   Injectable 30 milliGRAM(s) IV Push every 8 hours PRN Moderate Pain (4 - 6)

## 2022-11-09 NOTE — PROGRESS NOTE ADULT - ASSESSMENT
32 year old F POD3 s/p laparoscopic Left ovarian cystectomy presenting with fevers for 1 day. CT scan demonstrating postoperative changes w/ concern for omental infarct. POD1 s/p diagnostic laparoscopy due to concern for peritonitis, with no finding of bowel injury.    Plan  - Regular diet  - Pain control prn  - Remaining care per GYN      B Surgery, [id number]

## 2022-11-09 NOTE — PROGRESS NOTE ADULT - SUBJECTIVE AND OBJECTIVE BOX
Subjective:   Patient seen at bedside this AM. Tolerating diet, passing flatus. Denies n/v    Objective:  Vital Signs  T(C): 36.6 (11-09 @ 05:20), Max: 37.1 (11-08 @ 14:15)  HR: 66 (11-09 @ 05:20) (66 - 98)  BP: 110/67 (11-09 @ 05:20) (103/57 - 121/77)  RR: 18 (11-09 @ 05:20) (12 - 20)  SpO2: 97% (11-09 @ 05:20) (95% - 100%)  11-08-22 @ 07:01  -  11-09-22 @ 07:00  --------------------------------------------------------  IN:  Total IN: 0 mL    OUT:    Stool (mL): 0 mL    Voided (mL): 1600 mL  Total OUT: 1600 mL    Total NET: -1600 mL          Physical Exam:  GEN: resting in bed comfortably in NAD  RESP: no increased WOB  ABD: soft, non-distended. Mildly TTP around incision sites.  EXTR: warm, well-perfused, no edema  NEURO: awake, alert    Labs:                        10.5   x     )-----------( 284      ( 09 Nov 2022 05:27 )             32.4   11-09    x   |  x   |  6<L>  ----------------------------<  113<H>  x    |  22  |  0.42<L>    Ca    8.4      09 Nov 2022 05:27  Phos  2.6     11-09  Mg     1.90     11-09      CAPILLARY BLOOD GLUCOSE          Imaging:

## 2022-11-10 ENCOUNTER — TRANSCRIPTION ENCOUNTER (OUTPATIENT)
Age: 32
End: 2022-11-10

## 2022-11-10 VITALS
OXYGEN SATURATION: 99 % | DIASTOLIC BLOOD PRESSURE: 62 MMHG | RESPIRATION RATE: 16 BRPM | SYSTOLIC BLOOD PRESSURE: 115 MMHG | HEART RATE: 68 BPM | TEMPERATURE: 99 F

## 2022-11-10 LAB
HCT VFR BLD CALC: 32.6 % — LOW (ref 34.5–45)
HGB BLD-MCNC: 10.7 G/DL — LOW (ref 11.5–15.5)
MCHC RBC-ENTMCNC: 28.5 PG — SIGNIFICANT CHANGE UP (ref 27–34)
MCHC RBC-ENTMCNC: 32.8 GM/DL — SIGNIFICANT CHANGE UP (ref 32–36)
MCV RBC AUTO: 86.7 FL — SIGNIFICANT CHANGE UP (ref 80–100)
NRBC # BLD: 0 /100 WBCS — SIGNIFICANT CHANGE UP (ref 0–0)
NRBC # FLD: 0 K/UL — SIGNIFICANT CHANGE UP (ref 0–0)
PLATELET # BLD AUTO: 306 K/UL — SIGNIFICANT CHANGE UP (ref 150–400)
RBC # BLD: 3.76 M/UL — LOW (ref 3.8–5.2)
RBC # FLD: 13.2 % — SIGNIFICANT CHANGE UP (ref 10.3–14.5)
WBC # BLD: 19.39 K/UL — HIGH (ref 3.8–10.5)
WBC # FLD AUTO: 19.39 K/UL — HIGH (ref 3.8–10.5)

## 2022-11-10 PROCEDURE — 99238 HOSP IP/OBS DSCHRG MGMT 30/<: CPT | Mod: GC

## 2022-11-10 RX ORDER — IBUPROFEN 200 MG
600 TABLET ORAL EVERY 6 HOURS
Refills: 0 | Status: DISCONTINUED | OUTPATIENT
Start: 2022-11-10 | End: 2022-11-10

## 2022-11-10 RX ORDER — IBUPROFEN 200 MG
1 TABLET ORAL
Qty: 0 | Refills: 0 | DISCHARGE

## 2022-11-10 RX ORDER — SENNA PLUS 8.6 MG/1
2 TABLET ORAL
Qty: 0 | Refills: 0 | DISCHARGE
Start: 2022-11-10

## 2022-11-10 RX ORDER — ACETAMINOPHEN 500 MG
3 TABLET ORAL
Qty: 0 | Refills: 0 | DISCHARGE

## 2022-11-10 RX ORDER — POLYETHYLENE GLYCOL 3350 17 G/17G
17 POWDER, FOR SOLUTION ORAL
Qty: 0 | Refills: 0 | DISCHARGE
Start: 2022-11-10

## 2022-11-10 RX ORDER — ACETAMINOPHEN 500 MG
975 TABLET ORAL EVERY 6 HOURS
Refills: 0 | Status: DISCONTINUED | OUTPATIENT
Start: 2022-11-10 | End: 2022-11-10

## 2022-11-10 RX ADMIN — Medication 975 MILLIGRAM(S): at 09:46

## 2022-11-10 RX ADMIN — Medication 1 TABLET(S): at 05:35

## 2022-11-10 RX ADMIN — Medication 600 MILLIGRAM(S): at 12:10

## 2022-11-10 RX ADMIN — Medication 975 MILLIGRAM(S): at 10:40

## 2022-11-10 RX ADMIN — Medication 600 MILLIGRAM(S): at 13:10

## 2022-11-10 NOTE — DISCHARGE NOTE NURSING/CASE MANAGEMENT/SOCIAL WORK - PATIENT PORTAL LINK FT
You can access the FollowMyHealth Patient Portal offered by [facility] by registering at the following website: [email or web address]. By joining [employer]’s FollowMyHealth portal, you will also be able to view your health information using other applications (apps) compatible with our system.

## 2022-11-10 NOTE — PROGRESS NOTE ADULT - ATTENDING COMMENTS
saw and examined patient   for discharge on PO antibiotics  doing well  reviewed pathology with Patient
saw patient prior to diagnostic laparoscopy. Aware of plan
Pt seen by me @1030am  She reports sudden onset of nausea, no vomiting.  She continues to have abdominal pain.  Will hold off on changing switching from IV meds to PO meds  Continue Abx and pain management.

## 2022-11-10 NOTE — PROGRESS NOTE ADULT - SUBJECTIVE AND OBJECTIVE BOX
Subjective:   Patient seen at bedside this AM. Tolerating diet, denies pain. No n/v, fevers or chills    Objective:  Vital Signs  T(C): 36.9 (11-10 @ 05:23), Max: 37 (11-09 @ 09:51)  HR: 65 (11-10 @ 05:23) (59 - 76)  BP: 121/67 (11-10 @ 05:23) (110/68 - 129/90)  RR: 16 (11-10 @ 05:23) (16 - 18)  SpO2: 99% (11-10 @ 05:23) (98% - 100%)  11-09-22 @ 07:01  -  11-10-22 @ 07:00  --------------------------------------------------------  IN:  Total IN: 0 mL    OUT:    Stool (mL): 0 mL    Voided (mL): 1900 mL  Total OUT: 1900 mL    Total NET: -1900 mL        Physical Exam:  GEN: resting in bed comfortably in NAD  RESP: no increased WOB  ABD: soft, non-distended. Mildly TTP around incision sites.  EXTR: warm, well-perfused, no edema  NEURO: awake, alert    Labs:                        10.7   19.39 )-----------( 306      ( 10 Nov 2022 05:22 )             32.6   11-09    137  |  104  |  6<L>  ----------------------------<  113<H>  4.1   |  22  |  0.42<L>    Ca    8.4      09 Nov 2022 05:27  Phos  2.6     11-09  Mg     1.90     11-09      CAPILLARY BLOOD GLUCOSE          Imaging:

## 2022-11-10 NOTE — PROGRESS NOTE ADULT - ASSESSMENT
A/P: 32y POD#7 s/p LSC L ovarian cystectomy re-admitted on POD#3 2/2 fevers, Tmax 39.3 on 11/6@5p. POD#2 now from Dx LSC and [facility] with gen surg, no bowel injury noted. Pt afebrile overnight and asymptomatic this AM. Overall patient is stable and doing well.      Neuro: Pain well controlled, pt currently on IV Tylenol and Toradol, will transition to PO Tylenol, Motrin today  CV: Hemodynamically stable, AM labs include CBC pending, of note WBC downtrending to 15.84 yesterday  Pulm: Saturating well on room air, encourage oob/amb, encourage use of incentive spirometry  - CXR (11/6): clear lungs  GI: Patient tolerating regular diet  : UOP adequate, Voiding spontaneously  - UA (11/6): wnl  Heme: c/w Lovenox, ambulation and SCDs for DVT ppx  FEN: SLIV, replete electrolytes prn   ID: Afebrile overnight, Tmax 39.C 11/6@5p  - s/p Zosyn (11/6-11/7), continue Augmentin (11/9-)  - UCx neg (11/6), BCx-p neg (11/6), f/u BCx-f  - CTAP (11/6): interval left adnexal cystectomy with associated fat stranding and inflammatory changes more prominent along the right paracolic gutter suggesting postsurgical changes. Superimposed omental infarct and/or early infection can also be considered. No drainable fluid collection or abscess.  - Gen surg recs: pain control, NSAIDs, OR 11/8 for DX LSC: inflamed omentum, no bowel injury, no fecal material or bile, sebaceous material and hair seen across abdomen  Endo: No active issues   Dispo: Continue routine post-op care    Jayde Morrissey PGY1   A/P: 32y POD#7 s/p LSC L ovarian cystectomy re-admitted on POD#3 2/2 fevers, Tmax 39.3 on 11/6@5p. POD#2 now from Dx LSC and [facility] with gen surg, no bowel injury noted. Pt afebrile overnight and asymptomatic this AM. Overall patient is stable and doing well.      Neuro: Pain well controlled, pt currently on IV Tylenol and Toradol, will transition to PO Tylenol, Motrin today  CV: Hemodynamically stable, AM labs include CBC pending, of note WBC downtrending to 15.84 yesterday  Pulm: Saturating well on room air, encourage oob/amb, encourage use of incentive spirometry  - CXR (11/6): clear lungs  GI: Patient tolerating regular diet  : UOP adequate, Voiding spontaneously  - UA (11/6): wnl  Heme: c/w Lovenox, ambulation and SCDs for DVT ppx  FEN: SLIV, replete electrolytes prn   ID: Afebrile overnight, Tmax 39.C 11/6@5p  - s/p Zosyn (11/6-11/7), continue Augmentin (11/9-)  - UCx neg (11/6), BCx-p neg (11/6), f/u BCx-f  - CTAP (11/6): interval left adnexal cystectomy with associated fat stranding and inflammatory changes more prominent along the right paracolic gutter suggesting postsurgical changes. Superimposed omental infarct and/or early infection can also be considered. No drainable fluid collection or abscess.  - Gen surg recs: pain control, NSAIDs, OR 11/8 for DX LSC: inflamed omentum, no bowel injury, no fecal material or bile, sebaceous material and hair seen across abdomen  Endo: No active issues   Dispo: Continue routine post-op care, possible discharge planning     Jayde Morrissey PGY1  Darleen Uribe, PGY3

## 2022-11-10 NOTE — PROGRESS NOTE ADULT - SUBJECTIVE AND OBJECTIVE BOX
R1 GYN Progress Note    POD#7 from LSC L ovarian cystectomy POD#2 from Dx [facility] [place]   HD#6    Patient seen and examined at bedside. No acute events overnight. No acute complaints, pt reports she has been feeling better. Pain well controlled.  Patient is ambulating and tolerating diet.  Patient is passing flatus.    Patient is voiding spontaneously.   Denies CP, SOB, N/V, fevers, and chills.    Vital Signs Last 24 Hours  T(C): 36.9 (11-10-22 @ 05:23), Max: 37 (11-09-22 @ 09:51)  HR: 65 (11-10-22 @ 05:23) (59 - 76)  BP: 121/67 (11-10-22 @ 05:23) (110/68 - 121/78)  RR: 16 (11-10-22 @ 05:23) (16 - 18)  SpO2: 99% (11-10-22 @ 05:23) (98% - 100%)    I&O's Summary    08 Nov 2022 07:01  -  09 Nov 2022 07:00  --------------------------------------------------------  IN: 1880 mL / OUT: 1600 mL / NET: 280 mL    09 Nov 2022 07:01  -  10 Nov 2022 06:58  --------------------------------------------------------  IN: 1200 mL / OUT: 1900 mL / NET: -700 mL        Physical Exam:  General: NAD  CV: RR  Lungs: CTA b/l  Abdomen: Soft, mildly tender, mildly distended, tympanic, normoactive bowel sounds  Incision: 4 lsc port sites CDI  : no bleeding on pad  Ext: No pain or swelling in lower extremities bilaterally     Labs:                        10.7   19.39 )-----------( 306      ( 10 Nov 2022 05:22 )             32.6   baso x      eos x      imm gran x      lymph x      mono x      poly x                            10.5   15.84 )-----------( 284      ( 09 Nov 2022 05:27 )             32.4   baso x      eos x      imm gran x      lymph x      mono x      poly x                            10.8   21.53 )-----------( 239      ( 08 Nov 2022 06:01 )             33.7   baso 0.2    eos 0.5    imm gran 0.6    lymph 5.1    mono 2.8    poly 90.8                         10.5   16.54 )-----------( 227      ( 07 Nov 2022 07:01 )             32.9   baso 0.1    eos 0.4    imm gran 0.8    lymph 4.5    mono 3.5    poly 90.7       MEDICATIONS  (STANDING):  acetaminophen   IVPB .. 1000 milliGRAM(s) IV Intermittent once  amoxicillin  875 milliGRAM(s)/clavulanate 1 Tablet(s) Oral every 12 hours  enoxaparin Injectable 40 milliGRAM(s) SubCutaneous every 24 hours  polyethylene glycol 3350 17 Gram(s) Oral daily  senna 2 Tablet(s) Oral at bedtime    MEDICATIONS  (PRN):  ketorolac   Injectable 30 milliGRAM(s) IV Push every 8 hours PRN Moderate Pain (4 - 6)

## 2022-11-10 NOTE — DISCHARGE NOTE NURSING/CASE MANAGEMENT/SOCIAL WORK - NSTRANSFERBELONGINGSDISPO_GEN_A_NUR
Chronic renal failure    Coronary artery disease    Diabetes    Diabetic neuropathy    Hyperlipemia    Hypertension    Hypothyroid    Left bundle branch block    Osteoporosis    Pacemaker  [employer] 2011  Peripheral arterial disease    Spinal stenosis with patient

## 2022-11-10 NOTE — DISCHARGE NOTE NURSING/CASE MANAGEMENT/SOCIAL WORK - NSDCPEFALRISK_GEN_ALL_CORE
For information on Fall & Injury Prevention, visit: [email or web address] OR  [email or web address] OR  https://www.cdc.gov/steadi/patient.html

## 2022-11-10 NOTE — PROGRESS NOTE ADULT - REASON FOR ADMISSION
post operative fever

## 2022-11-10 NOTE — DISCHARGE NOTE NURSING/CASE MANAGEMENT/SOCIAL WORK - NSDCPNINST_GEN_ALL_CORE
Follow up w/ PMD. Report to MD/ED for fever,chills, drainange from incision site, pain that is not relieved w/ pain med., unable to tolerate foods or liquids.

## 2022-11-10 NOTE — PROGRESS NOTE ADULT - ASSESSMENT
32 year old F POD3 s/p laparoscopic Left ovarian cystectomy presenting with fevers for 1 day. CT scan demonstrating postoperative changes w/ concern for omental infarct. POD1 s/p diagnostic laparoscopy due to concern for peritonitis, with no finding of bowel injury.    Plan  - Regular diet  - Pain control prn  - Remaining care per GYN      B Surgery, [id number]

## 2022-11-11 LAB
CULTURE RESULTS: SIGNIFICANT CHANGE UP
CULTURE RESULTS: SIGNIFICANT CHANGE UP
SPECIMEN SOURCE: SIGNIFICANT CHANGE UP
SPECIMEN SOURCE: SIGNIFICANT CHANGE UP

## 2022-11-18 ENCOUNTER — APPOINTMENT (OUTPATIENT)
Dept: OBGYN | Facility: CLINIC | Age: 32
End: 2022-11-18

## 2022-11-18 VITALS
DIASTOLIC BLOOD PRESSURE: 76 MMHG | BODY MASS INDEX: 21.68 KG/M2 | HEIGHT: 64 IN | WEIGHT: 127 LBS | SYSTOLIC BLOOD PRESSURE: 123 MMHG | HEART RATE: 92 BPM

## 2022-11-18 DIAGNOSIS — T81.42XS: ICD-10-CM

## 2022-11-18 PROCEDURE — 99024 POSTOP FOLLOW-UP VISIT: CPT

## 2022-11-18 NOTE — HISTORY OF PRESENT ILLNESS
[Pain is well-controlled] : pain is well-controlled [Fever] : no fever [Chills] : no chills [Nausea] : no nausea [Vomiting] : no vomiting [Clean/Dry/Intact] : clean, dry and intact [Erythema] : not erythematous [Healed] : healed [Discharge] : absent of discharge [None] : no vaginal bleeding [Normal] : normal [Pathology reviewed] : pathology reviewed [de-identified] : 10 [de-identified] : diagnostic laparoscopy  [de-identified] : r/o perforated viscus [de-identified] : pt presents for post op f/u s/p readmission post laparoscopic left cystectomy for dermoid  11/3. , pt was readmitted on 11/6 and taken back to the O.R. on 11/8 for diagnostic laparotomy to r/o perforated viscus, due to increasing abdominal pain and hyperpyrexia. no diagnostic findings were identified on repeat laparoscopy. pt was treated with I.V. antibiotics for 48 hours post re-op .  pt was discharged  on 11/10. pt feels well today.

## 2022-12-07 ENCOUNTER — APPOINTMENT (OUTPATIENT)
Dept: OBGYN | Facility: CLINIC | Age: 32
End: 2022-12-07

## 2022-12-12 ENCOUNTER — NON-APPOINTMENT (OUTPATIENT)
Age: 32
End: 2022-12-12

## 2022-12-23 ENCOUNTER — APPOINTMENT (OUTPATIENT)
Dept: OBGYN | Facility: CLINIC | Age: 32
End: 2022-12-23

## 2023-01-02 ENCOUNTER — EMERGENCY (EMERGENCY)
Facility: HOSPITAL | Age: 33
LOS: 1 days | Discharge: HOME | End: 2023-01-02
Attending: STUDENT IN AN ORGANIZED HEALTH CARE EDUCATION/TRAINING PROGRAM
Payer: COMMERCIAL

## 2023-01-02 VITALS
RESPIRATION RATE: 16 BRPM | WEIGHT: 113.1 LBS | SYSTOLIC BLOOD PRESSURE: 131 MMHG | OXYGEN SATURATION: 100 % | TEMPERATURE: 98 F | DIASTOLIC BLOOD PRESSURE: 88 MMHG | HEART RATE: 102 BPM | HEIGHT: 64 IN

## 2023-01-02 VITALS
RESPIRATION RATE: 18 BRPM | SYSTOLIC BLOOD PRESSURE: 114 MMHG | TEMPERATURE: 98 F | HEART RATE: 78 BPM | OXYGEN SATURATION: 98 % | DIASTOLIC BLOOD PRESSURE: 76 MMHG

## 2023-01-02 DIAGNOSIS — Z90.49 ACQUIRED ABSENCE OF OTHER SPECIFIED PARTS OF DIGESTIVE TRACT: Chronic | ICD-10-CM

## 2023-01-02 LAB
ALBUMIN SERPL ELPH-MCNC: 4 G/DL — SIGNIFICANT CHANGE UP (ref 3.3–5)
ALP SERPL-CCNC: 94 U/L — SIGNIFICANT CHANGE UP (ref 40–120)
ALT FLD-CCNC: 23 U/L — SIGNIFICANT CHANGE UP (ref 10–45)
ANION GAP SERPL CALC-SCNC: 15 MMOL/L — SIGNIFICANT CHANGE UP (ref 5–17)
APPEARANCE UR: ABNORMAL
AST SERPL-CCNC: 25 U/L — SIGNIFICANT CHANGE UP (ref 10–40)
BASOPHILS # BLD AUTO: 0.01 K/UL — SIGNIFICANT CHANGE UP (ref 0–0.2)
BASOPHILS NFR BLD AUTO: 0.1 % — SIGNIFICANT CHANGE UP (ref 0–2)
BILIRUB SERPL-MCNC: 0.4 MG/DL — SIGNIFICANT CHANGE UP (ref 0.2–1.2)
BILIRUB UR-MCNC: NEGATIVE — SIGNIFICANT CHANGE UP
BUN SERPL-MCNC: <4 MG/DL — LOW (ref 7–23)
CALCIUM SERPL-MCNC: 9.4 MG/DL — SIGNIFICANT CHANGE UP (ref 8.4–10.5)
CHLORIDE SERPL-SCNC: 98 MMOL/L — SIGNIFICANT CHANGE UP (ref 96–108)
CO2 SERPL-SCNC: 22 MMOL/L — SIGNIFICANT CHANGE UP (ref 22–31)
COLOR SPEC: SIGNIFICANT CHANGE UP
CREAT SERPL-MCNC: 0.57 MG/DL — SIGNIFICANT CHANGE UP (ref 0.5–1.3)
DIFF PNL FLD: NEGATIVE — SIGNIFICANT CHANGE UP
EGFR: 125 ML/MIN/1.73M2 — SIGNIFICANT CHANGE UP
EOSINOPHIL # BLD AUTO: 0.15 K/UL — SIGNIFICANT CHANGE UP (ref 0–0.5)
EOSINOPHIL NFR BLD AUTO: 1.4 % — SIGNIFICANT CHANGE UP (ref 0–6)
FLUAV AG NPH QL: SIGNIFICANT CHANGE UP
FLUBV AG NPH QL: SIGNIFICANT CHANGE UP
GLUCOSE SERPL-MCNC: 68 MG/DL — LOW (ref 70–99)
GLUCOSE UR QL: NEGATIVE — SIGNIFICANT CHANGE UP
HCG SERPL-ACNC: <2 MIU/ML — SIGNIFICANT CHANGE UP
HCT VFR BLD CALC: 35.9 % — SIGNIFICANT CHANGE UP (ref 34.5–45)
HGB BLD-MCNC: 10.9 G/DL — LOW (ref 11.5–15.5)
IMM GRANULOCYTES NFR BLD AUTO: 0.6 % — SIGNIFICANT CHANGE UP (ref 0–0.9)
KETONES UR-MCNC: ABNORMAL
LEUKOCYTE ESTERASE UR-ACNC: NEGATIVE — SIGNIFICANT CHANGE UP
LIDOCAIN IGE QN: 13 U/L — SIGNIFICANT CHANGE UP (ref 7–60)
LYMPHOCYTES # BLD AUTO: 1.7 K/UL — SIGNIFICANT CHANGE UP (ref 1–3.3)
LYMPHOCYTES # BLD AUTO: 16.2 % — SIGNIFICANT CHANGE UP (ref 13–44)
MCHC RBC-ENTMCNC: 24.9 PG — LOW (ref 27–34)
MCHC RBC-ENTMCNC: 30.4 GM/DL — LOW (ref 32–36)
MCV RBC AUTO: 82.2 FL — SIGNIFICANT CHANGE UP (ref 80–100)
MONOCYTES # BLD AUTO: 0.51 K/UL — SIGNIFICANT CHANGE UP (ref 0–0.9)
MONOCYTES NFR BLD AUTO: 4.9 % — SIGNIFICANT CHANGE UP (ref 2–14)
NEUTROPHILS # BLD AUTO: 8.05 K/UL — HIGH (ref 1.8–7.4)
NEUTROPHILS NFR BLD AUTO: 76.8 % — SIGNIFICANT CHANGE UP (ref 43–77)
NITRITE UR-MCNC: NEGATIVE — SIGNIFICANT CHANGE UP
NRBC # BLD: 0 /100 WBCS — SIGNIFICANT CHANGE UP (ref 0–0)
PH UR: 6 — SIGNIFICANT CHANGE UP (ref 5–8)
PLATELET # BLD AUTO: 463 K/UL — HIGH (ref 150–400)
POTASSIUM SERPL-MCNC: 4 MMOL/L — SIGNIFICANT CHANGE UP (ref 3.5–5.3)
POTASSIUM SERPL-SCNC: 4 MMOL/L — SIGNIFICANT CHANGE UP (ref 3.5–5.3)
PROT SERPL-MCNC: 8.8 G/DL — HIGH (ref 6–8.3)
PROT UR-MCNC: NEGATIVE — SIGNIFICANT CHANGE UP
RBC # BLD: 4.37 M/UL — SIGNIFICANT CHANGE UP (ref 3.8–5.2)
RBC # FLD: 13.1 % — SIGNIFICANT CHANGE UP (ref 10.3–14.5)
RSV RNA NPH QL NAA+NON-PROBE: SIGNIFICANT CHANGE UP
SARS-COV-2 RNA SPEC QL NAA+PROBE: SIGNIFICANT CHANGE UP
SODIUM SERPL-SCNC: 135 MMOL/L — SIGNIFICANT CHANGE UP (ref 135–145)
SP GR SPEC: 1.01 — LOW (ref 1.01–1.02)
UROBILINOGEN FLD QL: NEGATIVE — SIGNIFICANT CHANGE UP
WBC # BLD: 10.48 K/UL — SIGNIFICANT CHANGE UP (ref 3.8–10.5)
WBC # FLD AUTO: 10.48 K/UL — SIGNIFICANT CHANGE UP (ref 3.8–10.5)

## 2023-01-02 PROCEDURE — 84702 CHORIONIC GONADOTROPIN TEST: CPT

## 2023-01-02 PROCEDURE — 85025 COMPLETE CBC W/AUTO DIFF WBC: CPT

## 2023-01-02 PROCEDURE — 99284 EMERGENCY DEPT VISIT MOD MDM: CPT | Mod: 25

## 2023-01-02 PROCEDURE — 96361 HYDRATE IV INFUSION ADD-ON: CPT

## 2023-01-02 PROCEDURE — 96365 THER/PROPH/DIAG IV INF INIT: CPT | Mod: XU

## 2023-01-02 PROCEDURE — 96376 TX/PRO/DX INJ SAME DRUG ADON: CPT

## 2023-01-02 PROCEDURE — 74176 CT ABD & PELVIS W/O CONTRAST: CPT | Mod: MA

## 2023-01-02 PROCEDURE — 81001 URINALYSIS AUTO W/SCOPE: CPT

## 2023-01-02 PROCEDURE — 36415 COLL VENOUS BLD VENIPUNCTURE: CPT

## 2023-01-02 PROCEDURE — 80053 COMPREHEN METABOLIC PANEL: CPT

## 2023-01-02 PROCEDURE — 74177 CT ABD & PELVIS W/CONTRAST: CPT | Mod: 26,MD

## 2023-01-02 PROCEDURE — 99284 EMERGENCY DEPT VISIT MOD MDM: CPT

## 2023-01-02 PROCEDURE — 74176 CT ABD & PELVIS W/O CONTRAST: CPT | Mod: 26,59,MA

## 2023-01-02 PROCEDURE — 87637 SARSCOV2&INF A&B&RSV AMP PRB: CPT

## 2023-01-02 PROCEDURE — 74177 CT ABD & PELVIS W/CONTRAST: CPT | Mod: MD

## 2023-01-02 PROCEDURE — 82962 GLUCOSE BLOOD TEST: CPT

## 2023-01-02 PROCEDURE — 83690 ASSAY OF LIPASE: CPT

## 2023-01-02 PROCEDURE — 87086 URINE CULTURE/COLONY COUNT: CPT

## 2023-01-02 PROCEDURE — 96375 TX/PRO/DX INJ NEW DRUG ADDON: CPT

## 2023-01-02 RX ORDER — ACETAMINOPHEN 500 MG
1000 TABLET ORAL ONCE
Refills: 0 | Status: COMPLETED | OUTPATIENT
Start: 2023-01-02 | End: 2023-01-02

## 2023-01-02 RX ORDER — PIPERACILLIN AND TAZOBACTAM 4; .5 G/20ML; G/20ML
3.38 INJECTION, POWDER, LYOPHILIZED, FOR SOLUTION INTRAVENOUS ONCE
Refills: 0 | Status: COMPLETED | OUTPATIENT
Start: 2023-01-02 | End: 2023-01-02

## 2023-01-02 RX ORDER — SODIUM CHLORIDE 9 MG/ML
1000 INJECTION INTRAMUSCULAR; INTRAVENOUS; SUBCUTANEOUS ONCE
Refills: 0 | Status: COMPLETED | OUTPATIENT
Start: 2023-01-02 | End: 2023-01-02

## 2023-01-02 RX ADMIN — Medication 400 MILLIGRAM(S): at 11:48

## 2023-01-02 RX ADMIN — Medication 1000 MILLIGRAM(S): at 12:29

## 2023-01-02 RX ADMIN — Medication 1000 MILLIGRAM(S): at 12:56

## 2023-01-02 RX ADMIN — Medication 400 MILLIGRAM(S): at 20:01

## 2023-01-02 RX ADMIN — SODIUM CHLORIDE 1000 MILLILITER(S): 9 INJECTION INTRAMUSCULAR; INTRAVENOUS; SUBCUTANEOUS at 11:47

## 2023-01-02 RX ADMIN — SODIUM CHLORIDE 1000 MILLILITER(S): 9 INJECTION INTRAMUSCULAR; INTRAVENOUS; SUBCUTANEOUS at 13:47

## 2023-01-02 RX ADMIN — PIPERACILLIN AND TAZOBACTAM 200 GRAM(S): 4; .5 INJECTION, POWDER, LYOPHILIZED, FOR SOLUTION INTRAVENOUS at 17:55

## 2023-01-02 NOTE — CONSULT NOTE ADULT - SUBJECTIVE AND OBJECTIVE BOX
GENERAL SURGERY CONSULT NOTE  --------------------------------------------------------------------------------------------    HPI:   Patient is a 32y old  Female pmh MS (on IVIG), ITP (platelet count nl) left ovarian cyst s/p cystectomy 2022 complicated by post op fever and abdominal pain, concern for missed bowel injury s/p dx laparoscopy with no bowel injury found (omental infarct, cyst contents) who presents with a chief complaint of abdominal discomfort and bloating.    Patient reports has not felt at her baseline since 2022 after surgery. She mainly complains of bloating and poor PO intolerance with 15 lb weight loss. She says over the past few days especially the on  she had worsening generalized abdominal discomfort and pain. No known exacerbating or relieving factors. She had diarrhea for 5 days but normal bm last 2 days. Last full meal was around bradly. Had nausea overnight with no emesis and is passing gas. Had a couple bites of a sandwich in the ED.    Patient had EGD and colonoscopy in past that she reports were within normal limits. Denies f/n/c, dysuria.    ROS: 10-system review is otherwise negative except HPI above.      PAST MEDICAL & SURGICAL HISTORY:  Anxiety      Thrombocytopenia      Multiple sclerosis      History of cholecystectomy        FAMILY HISTORY:  Family history of heart disease (Grandparent)    [] Family history not pertinent as reviewed with the patient and family    SOCIAL HISTORY:    Denies tobacco use, alcohol use  Has children at home    ALLERGIES: sulfa drugs (Hives)      HOME MEDICATIONS:   ibuprofen 600 mg oral tablet: 1 tab(s) orally every 6 hours, As Needed (10 Nov 2022 12:49)  polyethylene glycol 3350 oral powder for reconstitution: 17 gram(s) orally once a day (10 Nov 2022 12:49)  Prenatal Multivitamins with Folic Acid 1 mg oral tablet: 1 tab(s) orally once a day (2022 10:52)  senna leaf extract oral tablet: 2 tab(s) orally once a day (at bedtime) (10 Nov 2022 12:49)  Tylenol 325 mg oral tablet: 3 tab(s) orally every 6 hours, As Needed (10 Nov 2022 12:49)      CURRENT MEDICATIONS  MEDICATIONS (STANDING):   MEDICATIONS (PRN):  --------------------------------------------------------------------------------------------    Vitals:   T(C): 36.8 (23 @ 19:17), Max: 36.8 (23 @ 10:31)  HR: 80 (23 @ 19:17) (80 - 102)  BP: 127/77 (23 @ 19:17) (106/67 - 131/88)  RR: 18 (23 19:17) (16 - 18)  SpO2: 98% (23 @ 19:17) (98% - 100%)  CAPILLARY BLOOD GLUCOSE      POCT Blood Glucose.: 114 mg/dL (2023 15:29)  POCT Blood Glucose.: 65 mg/dL (2023 13:38)  POCT Blood Glucose.: 70 mg/dL (2023 13:38)      Height (cm): 162.6 ( @ 10:31)  Weight (kg): 51.3 (01-02 @ 10:)  BMI (kg/m2): 19.4 (01-02 @ 10:)  BSA (m2): 1.54 ( 10:)    PHYSICAL EXAM:   General: NAD, Lying in bed comfortably  Neuro: A+Ox3  Cardio: Regular rate  Resp: Good effort  GI/Abd: Soft, minimally tender throughout abdomen, worse on right side, mildly distended  Vascular: All 4 extremities warm.  Skin: Intact, no breakdown  Lymphatic/Nodes: No palpable lymphadenopathy  Musculoskeletal: All 4 extremities moving spontaneously, no limitations  --------------------------------------------------------------------------------------------    LABS  CBC ( @ 12:09)                              10.9<L>                         10.48   )----------------(  463<H>     76.8  % Neutrophils, 16.2  % Lymphocytes, ANC: 8.05<H>                              35.9      BMP ( @ 12:09)             135     |  98      |  <4<L> 		Ca++ --      Ca 9.4                ---------------------------------( 68<L> 		Mg --                 4.0     |  22      |  0.57  			Ph --        LFTs ( @ 12:09)      TPro 8.8<H> / Alb 4.0 / TBili 0.4 / DBili -- / AST 25 / ALT 23 / AlkPhos 94            --------------------------------------------------------------------------------------------    MICROBIOLOGY  Urinalysis ( @ 13:07):     Color: Light Yellow / Appearance: Slightly Turbid<!> / S.006<L> / pH: 6.0 / Gluc: Negative / Ketones: Moderate<!> / Bili: Negative / Urobili: Negative / Protein :Negative / Nitrites: Negative / Leuk.Est: Negative / RBC: 2 / WBC: 4 / Sq Epi:  / Non Sq Epi: 25 / Bacteria Few<!>         --------------------------------------------------------------------------------------------    IMAGING  < from: CT Abdomen and Pelvis w/ IV Cont (23 @ 15:22) >  FINDINGS:  LOWER CHEST: Within normal limits.    LIVER: Scattered hypodensities measure less than 4 mm, unchanged,   probably cysts.  BILE DUCTS: Normal caliber.  GALLBLADDER: Cholecystectomy.  SPLEEN: Within normal limits.  PANCREAS: Within normal limits.  ADRENALS: Within normal limits.  KIDNEYS/URETERS: Within normal limits.    BLADDER: Within normal limits.  REPRODUCTIVE ORGANS: 1.6 cm left adnexal cyst, likely a physiological   follicle (2-95). Likely physiological follicles in the right ovary.    BOWEL: Mural thickening involving most of the ascending colon with   adjacent fatty stranding and fluid which extends into the pelvis. There   are likely multiple collapsed loops of small bowel in the right lower   quadrant which may also be similarly involved. No bowel obstruction.   Appendix is not visualized.  PERITONEUM: No free air.  VESSELS: Within normal limits.  RETROPERITONEUM/LYMPH NODES: No lymphadenopathy.  ABDOMINAL WALL: Within normal limits.  BONES: Within normal limits.    IMPRESSION:  Significant abnormality in the right lower quadrant with extension into   the pelvis with involvement of ascending colon and adjacent small bowel   loops. Findings can represent colitis and enteritis in the right clinical   setting. Appendix is not visualized. Therefore a perforated appendicitis   is not excluded.  A repeat CT with oral contrast may be helpful in better evaluating these   findings and in identifying the appendix.    < end of copied text >        --------------------------------------------------------------------------------------------

## 2023-01-02 NOTE — ED PROVIDER NOTE - NSFOLLOWUPINSTRUCTIONS_ED_ALL_ED_FT
1. TAKE ALL MEDICATIONS AS DIRECTED.    2. FOR PAIN OR FEVER YOU CAN TAKE IBUPROFEN (MOTRIN, ADVIL) OR ACETAMINOPHEN (TYLENOL) AS NEEDED, AS DIRECTED ON PACKAGING.  3. FOLLOW UP WITH YOUR PRIMARY DOCTOR WITHIN 5 DAYS AS DIRECTED.  4. IF YOU HAD LABS OR IMAGING DONE, YOU WERE GIVEN COPIES OF ALL LABS AND/OR IMAGING RESULTS FROM YOUR ER VISIT--PLEASE TAKE THEM WITH YOU TO YOUR FOLLOW UP APPOINTMENTS.  5. RETURN TO THE ER FOR ANY WORSENING SYMPTOMS OR CONCERNS.    Abdominal Pain    Many things can cause abdominal pain. Many times, abdominal pain is not caused by a disease and will improve without treatment. Your health care provider will do a physical exam to determine if there is a dangerous cause of your pain; blood tests and imaging may help determine the cause of your pain. However, in many cases, no cause may be found and you may need further testing as an outpatient. Monitor your abdominal pain for any changes.     SEEK IMMEDIATE MEDICAL CARE IF YOU HAVE ANY OF THE FOLLOWING SYMPTOMS: worsening abdominal pain, uncontrollable vomiting, profuse diarrhea, inability to have bowel movements or pass gas, black or bloody stools, fever accompanying chest pain or back pain, or fainting. These symptoms may represent a serious problem that is an emergency. Do not wait to see if the symptoms will go away. Get medical help right away. Call 911 and do not drive yourself to the hospital.

## 2023-01-02 NOTE — ED ADULT TRIAGE NOTE - CHIEF COMPLAINT QUOTE
abd pain s/p eating x 3 weeks. 15 pound weight loss. numerous abd surgeries, 11/8 last surgery. denies nausea, vomiting, fever.

## 2023-01-02 NOTE — ED PROVIDER NOTE - ATTENDING CONTRIBUTION TO CARE
32-year-old female with history of MS on IVIG every 2 weeks, prior left ovarian cystectomy performed on November 3 complicated by concern for postoperative infarction status post diagnostic laparoscopy on November 8 presents to the ER with abdominal pain worse in the past 48 hours not on any home meds as well as weight loss for the past 1 month of 15 pounds.  Patient denies any chest pain no shortness of breath no lightheadedness no dizziness.  She states that she has had decreased p.o. intake she is states her pain is worse with eating.  She has been trying to smoke marijuana to help with her appetite.  Patient states that has not been helping much.  Last bowel movement today no blood reports 1 episode of vomiting on December 25.  On exam patient awake alert oriented x3 she is clear lungs to auscultation bilaterally she has soft abdomen with tenderness in the right lower quadrant.  Patient denies any vaginal discharge or bleeding.  She has 5 out of 5 upper and lower extremity strength bilaterally.  Given patient's findings of right lower quadrant abdominal pain in the setting of a cystectomy on November 3 plan for blood work, CT scan of the abdomen and reassessment.  Patient's findings concerning for possible intra-abdominal infection limited to appendicitis or fluid collection versus urinary tract infection.  Additionally given patient's weight loss possible malignancy etiology.

## 2023-01-02 NOTE — ED ADULT NURSE NOTE - OBJECTIVE STATEMENT
33 y/o F with PMHx of MS, ITP, and PSHx of cholecystectomy, ovarian surgery 11/2022 presents to the ED with complaints of worsening abdominal pain. Patient notes abdominal pain began following ovarian surgery, however worsened in last week. Pain described as intermittent and sharp, 7/10 severity at present. Notes associated pain to groin, 1 episode of vomiting, and diarrhea which has since resolved. Notes 15 lb weight loss in the last month. Denies fever, dizziness, chest pain, shortness of breath, nausea, constipation, urinary changes, or leg swelling. AOx4 and speaking coherently with mother at bedside. Breathing is unlabored, spontaneous, and symmetrical. Abdomen is soft and nondistended. Tenderness noted to RLQ and suprapubic region. Bladder is nondistended. No peripheral edema noted. <2s capillary refill. Ambulatory with full ROM of all extremities.

## 2023-01-02 NOTE — ED PROVIDER NOTE - PATIENT PORTAL LINK FT
You can access the FollowMyHealth Patient Portal offered by [facility] by registering at the following website: [email or web address]. By joining [employer]’s FollowMyHealth portal, you will also be able to view your health information using other applications (apps) compatible with our system.

## 2023-01-02 NOTE — CONSULT NOTE ADULT - ASSESSMENT
ASSESSMENT:   Patient is a 32y old  Female pmh MS (on IVIG), ITP (platelet count nl) left ovarian cyst s/p cystectomy 11/2022 complicated by post op fever and abdominal pain, concern for missed bowel injury s/p dx laparoscopy with no bowel injury found (omental infarct, cyst contents) who presents with a chief complaint of abdominal discomfort and bloating. History, exam, labs not entirely consistent with appendicitis. May be colitis vs. persistent pain related to recent surgery.    PLAN:    - PO challenge and okay for discharge from surgical standpoint if able to tolerate food  - Return to hospital with worsening abdominal pain, inability to tolerate food, inability to pass gas/stool, fever, or otherwise concerned  - Recommend outpatient GI follow up for further delineation of abdominal bloating.      Discussed with fellow Dr. Padgett on behalf of Dr. Anderson.    [facility]  [id number]

## 2023-01-02 NOTE — ED PROVIDER NOTE - PROGRESS NOTE DETAILS
Shady Salcedo PGY3: Rpt CT also unable to visualize appendix. Pt states pain improved. Low suspicion for perforated appendicitis at this time, likely just colitis, however will await surgery final recs. Shady Salcedo PGY3: CT shows no obstruction and possible colitis/enteritis, but is unable to visualize appendix and cannot r/o perforated appendicitis. Recommended to get CT w/ oral contrast to better assess. Discussed with patient who would like to do repeat CT. Will also consult surgery for appendicitis eval. alan attending- surg recc dc wo abx does not believe this is colitis or perf appy, patient agreed w plan. dc with return prec and Follow up

## 2023-01-02 NOTE — ED PROVIDER NOTE - CLINICAL SUMMARY MEDICAL DECISION MAKING FREE TEXT BOX
31 y/o F, PMH of MS (on IVIG [id number]), PSHx of cholecystectomy, Lt ovarian cystectomy (11/3) c/b postop omental infarction s/p diagnostic laparoscopy w/ [facility] (11/8), presents to ED c/o abdominal pain x 3 weeks, worse In the last 48 hours. Pt also notes decreased appetite/PO intake w/ a 15 lb wt loss, abdominal distension, and suprapubic pressure.   VSS. Physical exam significant for RLQ ttp w/ rebound/guarding.  DDx includes Appendicitis vs. obstruction vs. UTI  Plan to check labs, UA, and CT A/P. IVF/Tylenol for relief. Reassess.

## 2023-01-02 NOTE — ED PROVIDER NOTE - NS ED ROS FT
Gen: Denies fever; +weight loss, +decreased PO  HEENT: Denies vision changes, ear pain, epistaxis, sore throat  CV: Denies chest pain, palpitations  Skin: Denies rash, erythema, color changes  Resp: Denies SOB, cough  Endo: Denies sensitivity to heat, cold, increased urination  GI: Denies constipation, nausea, vomiting, diarrhea; +abdominal pain, +distension  Msk: Denies back pain, LE swelling, extremity pain  : Denies dysuria, increased frequency; +suprapubic pressure  Neuro: Denies LOC, weakness, numbness, tingling  Psych: Denies hx of psych, hallucinations  ROS statement: all other ROS negative except as per HPI

## 2023-01-02 NOTE — ED PROVIDER NOTE - OBJECTIVE STATEMENT
31 y/o F, PMH of MS (on IVIG [id number]), PSHx of Lt ovarian cystectomy (11/3) c/b postop omental infarction s/p diagnostic laparoscopy w/ [facility] (11/8), presents to ED c/o abdominal pain x 3 weeks, worse In the last 48 hours. Pt also notes decreased appetite/PO intake w/ a 15 lb wt loss, abdominal distension, and suprapubic pressure. Pt states normal BMs. Denies f/c, CP, SOB, dysuria, hematuria, lightheadedness/dizziness, or any other symptoms at this time.

## 2023-01-03 LAB
CULTURE RESULTS: SIGNIFICANT CHANGE UP
SPECIMEN SOURCE: SIGNIFICANT CHANGE UP

## 2023-05-06 NOTE — ED ADULT NURSE REASSESSMENT NOTE - PATIENT ON (OXYGEN DELIVERY METHOD)
Reason for Disposition  • Lab calling with important or urgent test results    Protocols used: PCP CALL - NO TRIAGE-P-AH     room air

## 2023-07-05 ENCOUNTER — RX RENEWAL (OUTPATIENT)
Age: 33
End: 2023-07-05

## 2023-08-24 ENCOUNTER — APPOINTMENT (OUTPATIENT)
Dept: OBGYN | Facility: CLINIC | Age: 33
End: 2023-08-24

## 2023-09-25 ENCOUNTER — APPOINTMENT (OUTPATIENT)
Dept: OBGYN | Facility: CLINIC | Age: 33
End: 2023-09-25
Payer: COMMERCIAL

## 2023-09-25 VITALS — DIASTOLIC BLOOD PRESSURE: 95 MMHG | WEIGHT: 115 LBS | BODY MASS INDEX: 19.74 KG/M2 | SYSTOLIC BLOOD PRESSURE: 150 MMHG

## 2023-09-25 DIAGNOSIS — Z01.419 ENCOUNTER FOR GYNECOLOGICAL EXAMINATION (GENERAL) (ROUTINE) W/OUT ABNORMAL FINDINGS: ICD-10-CM

## 2023-09-25 DIAGNOSIS — Z30.09 ENCOUNTER FOR OTHER GENERAL COUNSELING AND ADVICE ON CONTRACEPTION: ICD-10-CM

## 2023-09-25 PROCEDURE — 99395 PREV VISIT EST AGE 18-39: CPT

## 2023-09-25 PROCEDURE — 36415 COLL VENOUS BLD VENIPUNCTURE: CPT

## 2023-09-25 RX ORDER — MEDROXYPROGESTERONE ACETATE 10 MG/1
10 TABLET ORAL DAILY
Qty: 10 | Refills: 0 | Status: ACTIVE | COMMUNITY
Start: 2023-09-25 | End: 1900-01-01

## 2023-09-25 RX ORDER — NORETHINDRONE ACETATE AND ETHINYL ESTRADIOL AND FERROUS FUMARATE 1MG-20(21)
1-20 KIT ORAL DAILY
Qty: 3 | Refills: 3 | Status: ACTIVE | COMMUNITY
Start: 2023-09-25 | End: 1900-01-01

## 2023-09-26 LAB
ALBUMIN SERPL ELPH-MCNC: 4.7 G/DL
ALP BLD-CCNC: 74 U/L
ALT SERPL-CCNC: 9 U/L
ANION GAP SERPL CALC-SCNC: 9 MMOL/L
AST SERPL-CCNC: 17 U/L
BILIRUB SERPL-MCNC: 0.3 MG/DL
BUN SERPL-MCNC: 4 MG/DL
C TRACH RRNA SPEC QL NAA+PROBE: NOT DETECTED
CALCIUM SERPL-MCNC: 9.5 MG/DL
CANCER AG125 SERPL-ACNC: 9 U/ML
CHLORIDE SERPL-SCNC: 102 MMOL/L
CO2 SERPL-SCNC: 26 MMOL/L
CREAT SERPL-MCNC: 0.59 MG/DL
EGFR: 123 ML/MIN/1.73M2
ESTIMATED AVERAGE GLUCOSE: 105 MG/DL
FSH SERPL-MCNC: 2.5 IU/L
GLUCOSE SERPL-MCNC: 85 MG/DL
HBA1C MFR BLD HPLC: 5.3 %
HCG SERPL-MCNC: <1 MIU/ML
LH SERPL-ACNC: 16.1 IU/L
N GONORRHOEA RRNA SPEC QL NAA+PROBE: NOT DETECTED
POTASSIUM SERPL-SCNC: 4.4 MMOL/L
PROLACTIN SERPL-MCNC: 14.8 NG/ML
PROT SERPL-MCNC: 8.2 G/DL
SODIUM SERPL-SCNC: 137 MMOL/L
SOURCE AMPLIFICATION: NORMAL
T4 FREE SERPL-MCNC: 1 NG/DL
TESTOST SERPL-MCNC: 19.9 NG/DL
TSH SERPL-ACNC: 3.32 UIU/ML

## 2023-09-30 LAB — CYTOLOGY CVX/VAG DOC THIN PREP: ABNORMAL

## 2024-04-15 NOTE — OB RN DELIVERY SUMMARY - NSCORDSECONDSA_OBGYN_A_OB_FT
I just want to make sure that there is no underlying cardiomyopathy.  An echocardiogram will be ordered.  If negative no further workup is needed cardiac wise unless there is recurrence.  
Mainly reasonably controlled.  Emphasis on weight loss to help with this.  
We talked at length about cutting back on potatoes and bread.  
120

## 2024-08-13 NOTE — OB PROVIDER H&P - NSLASTDATEVISIT_OBGYN_ALL_OB
Chief Complaints and History of Present Illnesses   Patient presents with    Follow Up     Acute angle-closure glaucoma of left eye          Chief Complaint(s) and History of Present Illness(es)       Follow Up              Laterality: left eye    Course: stable    Associated symptoms: dryness (infrequent) and photophobia.  Negative for eye pain and tearing    Treatments tried: artificial tears    Pain scale: 0/10    Comments: Acute angle-closure glaucoma of left eye                   Comments    She states that her vision has seemed stable in both eyes since her last eye exam.   She stopped her Dorzolamide but continues to use artificial tears as needed (generally once a day).    HUMAIRA Villafana 9:16 AM  August 13, 2024                            
Known

## 2024-09-30 ENCOUNTER — APPOINTMENT (OUTPATIENT)
Dept: OBGYN | Facility: CLINIC | Age: 34
End: 2024-09-30

## 2024-10-07 ENCOUNTER — NON-APPOINTMENT (OUTPATIENT)
Age: 34
End: 2024-10-07

## 2024-12-10 NOTE — PRE-OP CHECKLIST - BOWEL PREP
n/a
Render Risk Assessment In Note?: no
Additional Notes: Discussed isotretinoin if no improvement noted at follow up
Detail Level: Simple

## 2025-04-21 ENCOUNTER — APPOINTMENT (OUTPATIENT)
Dept: OBGYN | Facility: CLINIC | Age: 35
End: 2025-04-21

## 2025-06-03 NOTE — ED ADULT NURSE NOTE - NURSING GU BLADDER
Subjective   Kimmie is a 62 year old choose not to disclose who presents for Dental Problem (Pt c/o of possible tooth abscess to left lower molar/Pt states she is unable to get into the dentist/Pt c/o of swelling to left face, would like swelling to subside before a wedding she has on Friday)    Dental Problem      The patient presents for evaluation of a cracked tooth.    She reports a longstanding issue with a cracked molar on the left side, which has been present for several years. The onset of symptoms was noted over the past weekend, around 05/30/2025 or 05/31/2025. She does not have a regular dentist and is seeking immediate attention due to an upcoming wedding. She recalls a previous root canal procedure on the same tooth and suspects a fracture in the filling. The area is tender to touch, and she has been managing the discomfort with salt water rinses. She reports no associated fever. The pain intensifies when she is in a supine position at night.  She can eat and drink.  She denies any other constitutional symptoms    PAST SURGICAL HISTORY:  Root canal procedure on the affected molar.    Review of Systems   All other systems reviewed and are negative.        Objective   Vitals:    06/03/25 1557   BP: 128/78   Pulse: 85   Resp: 16   Temp: 97.6 °F (36.4 °C)   TempSrc: Tympanic   SpO2: 99%   Weight: 90.7 kg (200 lb)   PainSc: 3    PainLoc: Teeth  Comment: left lower     Physical Exam  Vitals reviewed.   Constitutional:       General: Kimmie is not in acute distress.     Appearance: Normal appearance. Kimmie is well-developed and normal weight. Kimmie is not ill-appearing, toxic-appearing or diaphoretic.   HENT:      Head: Normocephalic and atraumatic.      Right Ear: Tympanic membrane, ear canal and external ear normal.      Left Ear: Tympanic membrane, ear canal and external ear normal.      Nose: Nose normal. No congestion or rhinorrhea.      Mouth/Throat:      Pharynx: No posterior oropharyngeal erythema.      Cardiovascular:      Rate and Rhythm: Normal rate and regular rhythm.      Heart sounds: Normal heart sounds. No murmur heard.     No friction rub. No gallop.   Pulmonary:      Effort: Pulmonary effort is normal.      Breath sounds: Normal breath sounds.   Musculoskeletal:         General: Normal range of motion.   Skin:     General: Skin is warm.   Neurological:      General: No focal deficit present.      Mental Status: Kimmie is alert and oriented to person, place, and time.   Psychiatric:         Mood and Affect: Mood normal.         Mouth/Throat: Tenderness noted on the left molar area  Respiratory: Clear to auscultation, no wheezing, rales or rhonchi      Kimmie was seen today for dental problem.    Diagnoses and all orders for this visit:    Dental abscess    Other orders  -     amoxicillin-clavulanate (AUGMENTIN) 875-125 MG per tablet; Take 1 tablet by mouth in the morning and 1 tablet in the evening. Do all this for 7 days.    The patient has been properly counseled about the above diagnosis. All questions were answered and the patient was in agreement with the diagnosis, treatment and discharge plan. Patient understood and knows when and/or if to seek immediate medical attention (if symptoms worsen or do not improve, seek immediate medical attention or follow up with your Primary Care Physician in 3-5 days).     non-distended

## (undated) DEVICE — TIP METZENBAUM SCISSOR MONOPOLAR ENDOCUT (ORANGE)

## (undated) DEVICE — PACK GENERAL LAPAROSCOPY

## (undated) DEVICE — LIGASURE BLUNT TIP 37CM

## (undated) DEVICE — PREP BETADINE SPONGE STICKS

## (undated) DEVICE — UTERINE MANIPULATOR COOPER SURGICAL 5MM 33CM GREEN

## (undated) DEVICE — POSITIONER STRAP ARMBOARD VELCRO TS-30

## (undated) DEVICE — FOLEY TRAY 16FR 5CC LF UMETER CLOSED

## (undated) DEVICE — TROCAR COVIDIEN VERSAPORT BLADELESS OPTICAL 5MM STANDARD

## (undated) DEVICE — SUT VICRYL 0 27" UR-6

## (undated) DEVICE — POSITIONER PINK PAD PIGAZZI SYSTEM

## (undated) DEVICE — SOL IRR POUR H2O 500ML

## (undated) DEVICE — TROCAR COVIDIEN VERSAONE BLUNT TIP HASSAN 12MM

## (undated) DEVICE — PACK D&C

## (undated) DEVICE — SYR LUER LOK 10CC

## (undated) DEVICE — DRSG TEGADERM 2.5X3"

## (undated) DEVICE — BLADE SURGICAL #15 CARBON

## (undated) DEVICE — TUBING OLYMPUS INSUFFLATION

## (undated) DEVICE — ELCTR GROUNDING PAD ADULT COVIDIEN

## (undated) DEVICE — TROCAR COVIDIEN BLUNT TIP HASSAN 12MM STANDARD

## (undated) DEVICE — VENODYNE/SCD SLEEVE CALF MEDIUM

## (undated) DEVICE — CANISTER DISPOSABLE THIN WALL 3000CC

## (undated) DEVICE — TRAP SPECIMEN SPUTUM 40CC

## (undated) DEVICE — TUBING INSUFFLATION LAP FILTER 10FT

## (undated) DEVICE — ANESTHESIA CIRCUIT ADULT HMEF

## (undated) DEVICE — PROTECTOR HEEL / ELBOW FLUFFY

## (undated) DEVICE — BASIN SET SINGLE

## (undated) DEVICE — DRSG STERISTRIPS 0.5 X 4"

## (undated) DEVICE — UTERINE MANIPULATOR CLINICAL INNOVATIONS CLEARVIEW 7CM

## (undated) DEVICE — TUBING HYDRO-SURG PLUS IRRIGATOR W SMOKEVAC & PROBE

## (undated) DEVICE — GOWN XL

## (undated) DEVICE — GLV 6.5 PROTEXIS (WHITE)

## (undated) DEVICE — PACK PERI GYN

## (undated) DEVICE — TROCAR COVIDIEN BLUNT TIP HASSAN 10MM

## (undated) DEVICE — DRSG DERMABOND 0.7ML

## (undated) DEVICE — TROCAR COVIDIEN VERSAONE FIXATION CANNULA 5MM

## (undated) DEVICE — ENDOCATCH 10MM SPECIMEN POUCH

## (undated) DEVICE — SUT MONOCRYL 4-0 27" PS-2 UNDYED

## (undated) DEVICE — WARMING BLANKET FULL UNDERBODY

## (undated) DEVICE — DRSG TELFA 3 X 8

## (undated) DEVICE — SOL IRR POUR NS 0.9% 500ML

## (undated) DEVICE — WARMING BLANKET FULL ADULT